# Patient Record
Sex: FEMALE | Race: WHITE | NOT HISPANIC OR LATINO | Employment: FULL TIME | URBAN - METROPOLITAN AREA
[De-identification: names, ages, dates, MRNs, and addresses within clinical notes are randomized per-mention and may not be internally consistent; named-entity substitution may affect disease eponyms.]

---

## 2017-01-09 RX ORDER — NORGESTIMATE AND ETHINYL ESTRADIOL 7DAYSX3 28
1 KIT ORAL DAILY
Qty: 84 TABLET | Refills: 3 | Status: SHIPPED | OUTPATIENT
Start: 2017-01-09 | End: 2017-02-14 | Stop reason: SDUPTHER

## 2017-02-06 ENCOUNTER — TELEPHONE (OUTPATIENT)
Dept: INTERNAL MEDICINE | Facility: CLINIC | Age: 37
End: 2017-02-06

## 2017-02-06 DIAGNOSIS — E03.9 HYPOTHYROIDISM, UNSPECIFIED TYPE: Primary | ICD-10-CM

## 2017-02-06 RX ORDER — LEVOTHYROXINE SODIUM 125 UG/1
TABLET ORAL
Qty: 90 TABLET | Refills: 0 | Status: SHIPPED | OUTPATIENT
Start: 2017-02-06 | End: 2017-04-14 | Stop reason: SDUPTHER

## 2017-02-14 ENCOUNTER — OFFICE VISIT (OUTPATIENT)
Dept: OBSTETRICS AND GYNECOLOGY | Facility: CLINIC | Age: 37
End: 2017-02-14
Payer: COMMERCIAL

## 2017-02-14 VITALS
HEIGHT: 67 IN | WEIGHT: 233.44 LBS | DIASTOLIC BLOOD PRESSURE: 70 MMHG | SYSTOLIC BLOOD PRESSURE: 120 MMHG | BODY MASS INDEX: 36.64 KG/M2

## 2017-02-14 DIAGNOSIS — Z01.419 ENCOUNTER FOR GYNECOLOGICAL EXAMINATION: Primary | ICD-10-CM

## 2017-02-14 DIAGNOSIS — Z80.3 FAMILY HISTORY OF BREAST CANCER: ICD-10-CM

## 2017-02-14 PROCEDURE — 99999 PR PBB SHADOW E&M-EST. PATIENT-LVL III: CPT | Mod: PBBFAC,,, | Performed by: OBSTETRICS & GYNECOLOGY

## 2017-02-14 PROCEDURE — 99395 PREV VISIT EST AGE 18-39: CPT | Mod: S$GLB,,, | Performed by: OBSTETRICS & GYNECOLOGY

## 2017-02-14 RX ORDER — NORGESTIMATE AND ETHINYL ESTRADIOL 7DAYSX3 28
1 KIT ORAL DAILY
Qty: 84 TABLET | Refills: 3 | Status: SHIPPED | OUTPATIENT
Start: 2017-02-14 | End: 2017-08-18 | Stop reason: SDUPTHER

## 2017-02-14 NOTE — PROGRESS NOTES
HPI: Pt is a 36 y.o.  female who presents for routine annual exam. She uses [facility] for contraception. She does not want STD screening.     Pap in 2015 -- normal      ROS:  GENERAL: Feeling well overall. Denies fever or chills.   SKIN: Denies rash or lesions.   HEAD: Denies head injury or headache.   NODES: Denies enlarged lymph nodes.   CHEST: Denies chest pain or shortness of breath.   CARDIOVASCULAR: Denies palpitations or left sided chest pain.   ABDOMEN: No abdominal pain, constipation, diarrhea, nausea, vomiting or rectal bleeding.   URINARY: No dysuria, hematuria, or burning on urination.  REPRODUCTIVE: See HPI.   BREASTS: Denies pain, lumps, or nipple discharge.   HEMATOLOGIC: No easy bruisability or excessive bleeding.   MUSCULOSKELETAL: Denies joint pain or swelling.   NEUROLOGIC: Denies syncope or weakness.   PSYCHIATRIC: Denies depression, anxiety or mood swings.    PE:   APPEARANCE: Well nourished, well developed, White female in no acute distress.  NODES: no cervical, supraclavicular, or inguinal lymphadenopathy  BREASTS: Symmetrical, no skin changes or visible lesions. No palpable masses, nipple discharge or adenopathy bilaterally.  ABDOMEN: Soft. No tenderness or masses. No distention. No hernias palpated. No CVA tenderness.  VULVA: No lesions. Normal external female genitalia.  URETHRAL MEATUS: Normal size and location, no lesions, no prolapse.  URETHRA: No masses, tenderness, or prolapse.  VAGINA: Moist. No lesions or lacerations noted. No abnormal discharge present. No odor present.   CERVIX: No lesions or discharge. No cervical motion tenderness.   UTERUS: Normal size, regular shape, mobile, non-tender.  ADNEXA: No tenderness. No fullness or masses palpated in the adnexal regions.   ANUS PERINEUM: Normal.      Diagnosis:  1. Encounter for gynecological examination    2. Family history of breast cancer        Plan:     Orders Placed This Encounter    Mammo Digital Screening Bilat with  Tomosynthesis CAD    norgestimate-ethinyl estradiol (TRI-SPRINTEC, 28,) 0.18/0.215/0.25 mg-35 mcg (28) tablet     - patient has 2 family members -- maternal grandmother and maternal aunt with breast cancer. Will get baseline MMG.     Patient was counseled today on the new ACS guidelines for cervical cytology screening as well as the current recommendations for breast cancer screening. She was counseled to follow up with her PCP for other routine health maintenance. Counseling session lasted approximately 10 minutes, and all her questions were answered.    Follow-up with me in 1 year for routine exam; pap next year.

## 2017-02-14 NOTE — MR AVS SNAPSHOT
"    [facility] [address]  [address]  [place] [address]  Phone: [phone]  Fax: [phone]                  Josefina Mark   2017 10:00 AM   Office Visit    Description:  Female : 1980   Provider:  Yaritza Oden MD   Department:  [facility] [address]           Reason for Visit     Gynecologic Exam                To Do List           Future Appointments        Provider Department Dept Phone    2017 8:30 AM Lida Garcia MD [facility] - Internal Medicine [phone]      Goals (5 Years of Data)     None      [facility]     [facility] On Call Nurse [facility] Line -  Assistance  Registered nurses in the [facility] On Call Center provide clinical advisement, health education, appointment booking, and other advisory services.  Call for this free service at [phone].             Medications           Message regarding Medications     Verify the changes and/or additions to your medication regime listed below are the same as discussed with your clinician today.  If any of these changes or additions are incorrect, please notify your healthcare provider.             Verify that the below list of medications is an accurate representation of the medications you are currently taking.  If none reported, the list may be blank. If incorrect, please contact your healthcare provider. Carry this list with you in case of emergency.           Current Medications     levothyroxine (SYNTHROID) 125 MCG tablet TAKE 1 TABLET BY MOUTH EVERY DAY    norgestimate-ethinyl estradiol (TRI-SPRINTEC, 28,) 0.18/0.215/0.25 mg-35 mcg (28) tablet Take 1 tablet by mouth once daily.           Clinical Reference Information           Your Vitals Were     BP Height Weight Last Period BMI    120/70 5' 7" (1.702 m) 105.9 kg (233 lb 7.5 oz) 2017 (Exact Date) 36.57 kg/m2      Blood Pressure          Most Recent Value    BP  120/70      Allergies as of 2017     No Known " Allergies      Immunizations Administered on Date of Encounter - 2/14/2017     None      Language Assistance Services     ATTENTION: Language assistance services are available, free of charge. Please call [phone].      ATENCIÓN: Si radha oliveira, tiene a blackwell disposición servicios gratuitos de asistencia lingüística. Llame al [phone].     CHÚ Ý: N?u b?n nói Ti?ng Vi?t, có các d?ch v? h? tr? ngôn ng? mi?n phí dành cho b?n. G?i s? [phone].         [facility] [address] complies with applicable Federal civil rights laws and does not discriminate on the basis of race, color, national origin, age, disability, or sex.

## 2017-02-17 ENCOUNTER — HOSPITAL ENCOUNTER (OUTPATIENT)
Dept: RADIOLOGY | Facility: HOSPITAL | Age: 37
Discharge: HOME | End: 2017-02-17
Attending: OBSTETRICS & GYNECOLOGY
Payer: COMMERCIAL

## 2017-02-17 DIAGNOSIS — Z12.31 VISIT FOR SCREENING MAMMOGRAM: ICD-10-CM

## 2017-02-17 DIAGNOSIS — Z80.3 FAMILY HISTORY OF BREAST CANCER: ICD-10-CM

## 2017-02-17 PROCEDURE — 77067 SCR MAMMO BI INCL CAD: CPT | Mod: TC

## 2017-02-17 PROCEDURE — 77063 BREAST TOMOSYNTHESIS BI: CPT | Mod: 26,,, | Performed by: RADIOLOGY

## 2017-02-17 PROCEDURE — 77067 SCR MAMMO BI INCL CAD: CPT | Mod: 26,,, | Performed by: RADIOLOGY

## 2017-04-11 ENCOUNTER — OFFICE VISIT (OUTPATIENT)
Dept: INTERNAL MEDICINE | Facility: CLINIC | Age: 37
End: 2017-04-11
Payer: COMMERCIAL

## 2017-04-11 VITALS
HEIGHT: 67 IN | BODY MASS INDEX: 36.68 KG/M2 | SYSTOLIC BLOOD PRESSURE: 118 MMHG | DIASTOLIC BLOOD PRESSURE: 86 MMHG | HEART RATE: 84 BPM | WEIGHT: 233.69 LBS

## 2017-04-11 DIAGNOSIS — E03.9 HYPOTHYROIDISM, UNSPECIFIED TYPE: ICD-10-CM

## 2017-04-11 DIAGNOSIS — Z00.00 ROUTINE MEDICAL EXAM: Primary | ICD-10-CM

## 2017-04-11 DIAGNOSIS — R73.03 PRE-DIABETES: ICD-10-CM

## 2017-04-11 LAB
AMORPH CRY UR QL COMP ASSIST: ABNORMAL
BACTERIA #/AREA URNS AUTO: ABNORMAL /HPF
BILIRUB UR QL STRIP: NEGATIVE
CLARITY UR REFRACT.AUTO: ABNORMAL
COLOR UR AUTO: ABNORMAL
GLUCOSE UR QL STRIP: ABNORMAL
HGB UR QL STRIP: NEGATIVE
HYALINE CASTS UR QL AUTO: 6 /LPF
KETONES UR QL STRIP: NEGATIVE
LEUKOCYTE ESTERASE UR QL STRIP: NEGATIVE
MICROSCOPIC COMMENT: ABNORMAL
NITRITE UR QL STRIP: NEGATIVE
PH UR STRIP: 5 [PH] (ref 5–8)
PROT UR QL STRIP: NEGATIVE
RBC #/AREA URNS AUTO: 10 /HPF (ref 0–4)
SP GR UR STRIP: 1.03 (ref 1–1.03)
SQUAMOUS #/AREA URNS AUTO: 2 /HPF
URN SPEC COLLECT METH UR: ABNORMAL
UROBILINOGEN UR STRIP-ACNC: NEGATIVE EU/DL

## 2017-04-11 PROCEDURE — 99999 PR PBB SHADOW E&M-EST. PATIENT-LVL III: CPT | Mod: PBBFAC,,, | Performed by: INTERNAL MEDICINE

## 2017-04-11 PROCEDURE — 99395 PREV VISIT EST AGE 18-39: CPT | Mod: S$GLB,,, | Performed by: INTERNAL MEDICINE

## 2017-04-11 PROCEDURE — 81001 URINALYSIS AUTO W/SCOPE: CPT

## 2017-04-11 NOTE — PROGRESS NOTES
Subjective:       Patient ID: Josefina Mark is a 37 y.o. female.    Chief Complaint: Annual Exam    HPI Comments: Last seen 19 months ago. Returns for annual exam with no complaints. Admits lack of weight loss is due to junk food snacking and lack of exercise. Compliant with daily dosing of Levothyroxine, has called for refills.     Past Medical History: .   Hypothyroidism. TSH 1.88 Sep. '15.  Pre-Diabetes, Fasting Glucose 110, HbA1c 6.0%.  Obesity.  Pelvic exam 9/15 - Pap normal, Mammogram normal , Eye exam 8/15. Flu shot . Labs 9/15: CBC, CMP and Lipids normal (LDL 74).     Medications: Synthroid 125 mcg daily. OCP's.    No known drug allergies.    Social History: R.N. 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    Family medical history: Lung cancer in father. Mother has interstitial cystitis, a grandmother has diabetes and hypertension, paternal grandfather with prostate cancer, maternal grandmother with breast cancer at age 58, maternal aunt x2 with breast cancer, maternal grandfather with liver cancer, paternal aunt had colon cancer.                 Review of Systems   Constitutional: Negative for activity change, appetite change, fatigue, fever and unexpected weight change.   HENT: Negative for congestion, hearing loss, rhinorrhea, sneezing, sore throat, trouble swallowing and voice change.    Eyes: Negative for pain, redness and visual disturbance.   Respiratory: Negative for cough, chest tightness, shortness of breath and wheezing.    Cardiovascular: Negative for chest pain, palpitations and leg swelling.   Gastrointestinal: Negative for abdominal pain, blood in stool, constipation, diarrhea, nausea and vomiting.   Genitourinary: Negative for difficulty urinating, dysuria, flank pain, frequency, hematuria, menstrual problem and urgency.   Musculoskeletal: Negative for arthralgias, back pain, joint swelling, myalgias and neck pain.   Skin: Negative  "for rash and wound.   Neurological: Negative for dizziness, syncope, facial asymmetry, speech difficulty, weakness, numbness and headaches.   Hematological: Negative for adenopathy. Does not bruise/bleed easily.   Psychiatric/Behavioral: Negative for decreased concentration, dysphoric mood and sleep disturbance. The patient is not nervous/anxious.        Objective:    /86, Pulse 84, Ht 5' 7", Wt 233.7 lbs, BMI=36.6  Physical Exam   Constitutional: She is oriented to person, place, and time. She appears well-developed and well-nourished. No distress.   HENT:   Head: Normocephalic and atraumatic.   Right Ear: External ear normal.   Left Ear: External ear normal.   Nose: Nose normal.   Mouth/Throat: Oropharynx is clear and moist. No oropharyngeal exudate.   Eyes: Conjunctivae and EOM are normal. Pupils are equal, round, and reactive to light. Right conjunctiva is not injected. Left conjunctiva is not injected. No scleral icterus.   Neck: Normal range of motion. Neck supple. No JVD present. No thyromegaly present.   Cardiovascular: Normal rate, regular rhythm and normal heart sounds.  Exam reveals no gallop and no friction rub.    No murmur heard.  Pulmonary/Chest: Effort normal and breath sounds normal. No respiratory distress. She has no wheezes. She has no rhonchi. She has no rales.   Abdominal: Soft. Bowel sounds are normal. She exhibits no distension. There is no hepatosplenomegaly. There is no tenderness. There is no CVA tenderness.   Musculoskeletal: Normal range of motion. She exhibits no edema, tenderness or deformity.   Lymphadenopathy:     She has no cervical adenopathy.   Neurological: She is alert and oriented to person, place, and time. She has normal strength. No cranial nerve deficit. She exhibits normal muscle tone. Coordination and gait normal.   Skin: Skin is warm and dry. No lesion and no rash noted. She is not diaphoretic. No cyanosis or erythema. No pallor. Nails show no clubbing. "   Psychiatric: She has a normal mood and affect. Her behavior is normal. Thought content normal.   Vitals reviewed.      Assessment:       1. Routine medical exam    2. Hypothyroidism, unspecified type    3. Pre-diabetes    4. Obesity, Class II, BMI 35-39.9, with comorbidity        Plan:       Routine medical exam  -     CBC auto differential; Future; Expected date: 4/11/17  -     Comprehensive metabolic panel; Future; Expected date: 4/11/17  -     Lipid panel; Future; Expected date: 4/11/17  -     Vitamin D; Future; Expected date: 4/11/17  -     Urinalysis    Hypothyroidism, unspecified type  -     TSH; Future; Expected date: 4/11/17    Pre-diabetes  -     Hemoglobin A1c; Future; Expected date: 4/11/17    Obesity, Class II, BMI 35-39.9, with comorbidity        -     Counseled on diet and exercise for weight reduction, diabetes prevention and cardiovascular health.

## 2017-04-11 NOTE — MR AVS SNAPSHOT
[facility]  [address]  [facility] [address]  Phone: [phone]  Fax: [phone]                  Josefina Mark   2017 8:30 AM   Office Visit    Description:  Female : 1980   Provider:  Lida Garcia MD   Department:  [facility]           Reason for Visit     Annual Exam           Diagnoses this Visit        Comments    Routine medical exam    -  Primary     Hypothyroidism, unspecified type         Pre-diabetes         Obesity, Class II, BMI 35-39.9, with comorbidity                To Do List           Future Appointments        Provider Department Dept Phone    2017 8:15 AM LAB, [place] [facility] [phone]      Goals (5 Years of Data)     None      Follow-Up and Disposition     Return in about 1 year (around 2018).      [facility]     [facility] Nurse Care Line -  Assistance  Unless otherwise directed by your provider, please contact [facility], our nurse care line that is available for  assistance.     Registered nurses in the [facility] On Call Center provide: appointment scheduling, clinical advisement, health education, and other advisory services.  Call: [phone] (toll free)               Medications           Message regarding Medications     Verify the changes and/or additions to your medication regime listed below are the same as discussed with your clinician today.  If any of these changes or additions are incorrect, please notify your healthcare provider.             Verify that the below list of medications is an accurate representation of the medications you are currently taking.  If none reported, the list may be blank. If incorrect, please contact your healthcare provider. Carry this list with you in case of emergency.           Current Medications     levothyroxine (SYNTHROID) 125 MCG tablet TAKE 1 TABLET BY MOUTH EVERY DAY    norgestimate-ethinyl estradiol  "(TRI-SPRINTEC, 28,) 0.18/0.215/0.25 mg-35 mcg (28) tablet Take 1 tablet by mouth once daily.           Clinical Reference Information           Your Vitals Were     BP Pulse Height Weight BMI    118/86 84 5' 7" (1.702 m) 106 kg (233 lb 11.2 oz) 36.6 kg/m2      Blood Pressure          Most Recent Value    BP  118/86      Allergies as of 4/11/2017     No Known Allergies      Immunizations Administered on Date of Encounter - 4/11/2017     None      Orders Placed During Today's Visit      Normal Orders This Visit    Urinalysis     Future Labs/Procedures Expected by Expires    CBC auto differential  4/11/2017 4/11/2018    Comprehensive metabolic panel  4/11/2017 4/11/2018    Hemoglobin A1c  4/11/2017 4/11/2018    Lipid panel  4/11/2017 4/11/2018    TSH  4/11/2017 4/11/2018    Vitamin D  4/11/2017 4/11/2018      Language Assistance Services     ATTENTION: Language assistance services are available, free of charge. Please call [phone].      ATENCIÓN: Si habla español, tiene a blackwell disposición servicios gratuitos de asistencia lingüística. Llame al [phone].     JONAH Ý: N?u b?n nói Ti?ng Vi?t, có các d?ch v? h? tr? ngôn ng? mi?n phí dành cho b?n. G?i s? [phone].         [facility] complies with applicable Federal civil rights laws and does not discriminate on the basis of race, color, national origin, age, disability, or sex.        "

## 2017-04-12 ENCOUNTER — LAB VISIT (OUTPATIENT)
Dept: LAB | Facility: HOSPITAL | Age: 37
End: 2017-04-12
Attending: INTERNAL MEDICINE
Payer: COMMERCIAL

## 2017-04-12 DIAGNOSIS — R73.03 PRE-DIABETES: ICD-10-CM

## 2017-04-12 DIAGNOSIS — E03.9 HYPOTHYROIDISM, UNSPECIFIED TYPE: ICD-10-CM

## 2017-04-12 DIAGNOSIS — Z00.00 ROUTINE MEDICAL EXAM: ICD-10-CM

## 2017-04-12 LAB
25(OH)D3+25(OH)D2 SERPL-MCNC: 15 NG/ML
ALBUMIN SERPL BCP-MCNC: 3.6 G/DL
ALP SERPL-CCNC: 57 U/L
ALT SERPL W/O P-5'-P-CCNC: 69 U/L
ANION GAP SERPL CALC-SCNC: 9 MMOL/L
AST SERPL-CCNC: 39 U/L
BASOPHILS # BLD AUTO: 0.04 K/UL
BASOPHILS NFR BLD: 0.6 %
BILIRUB SERPL-MCNC: 0.4 MG/DL
BUN SERPL-MCNC: 10 MG/DL
CALCIUM SERPL-MCNC: 9.2 MG/DL
CHLORIDE SERPL-SCNC: 103 MMOL/L
CHOLEST/HDLC SERPL: 3.8 {RATIO}
CO2 SERPL-SCNC: 24 MMOL/L
CREAT SERPL-MCNC: 0.8 MG/DL
DIFFERENTIAL METHOD: NORMAL
EOSINOPHIL # BLD AUTO: 0.2 K/UL
EOSINOPHIL NFR BLD: 3.2 %
ERYTHROCYTE [DISTWIDTH] IN BLOOD BY AUTOMATED COUNT: 12.6 %
EST. GFR  (AFRICAN AMERICAN): >60 ML/MIN/1.73 M^2
EST. GFR  (NON AFRICAN AMERICAN): >60 ML/MIN/1.73 M^2
GLUCOSE SERPL-MCNC: 184 MG/DL
HCT VFR BLD AUTO: 40 %
HDL/CHOLESTEROL RATIO: 26.3 %
HDLC SERPL-MCNC: 205 MG/DL
HDLC SERPL-MCNC: 54 MG/DL
HGB BLD-MCNC: 13.1 G/DL
LDLC SERPL CALC-MCNC: 119.6 MG/DL
LYMPHOCYTES # BLD AUTO: 2.6 K/UL
LYMPHOCYTES NFR BLD: 39.5 %
MCH RBC QN AUTO: 29.4 PG
MCHC RBC AUTO-ENTMCNC: 32.8 %
MCV RBC AUTO: 90 FL
MONOCYTES # BLD AUTO: 0.5 K/UL
MONOCYTES NFR BLD: 7 %
NEUTROPHILS # BLD AUTO: 3.3 K/UL
NEUTROPHILS NFR BLD: 49.7 %
NONHDLC SERPL-MCNC: 151 MG/DL
PLATELET # BLD AUTO: 297 K/UL
PMV BLD AUTO: 10.4 FL
POTASSIUM SERPL-SCNC: 4.2 MMOL/L
PROT SERPL-MCNC: 7.3 G/DL
RBC # BLD AUTO: 4.46 M/UL
SODIUM SERPL-SCNC: 136 MMOL/L
TRIGL SERPL-MCNC: 157 MG/DL
TSH SERPL DL<=0.005 MIU/L-ACNC: 2.21 UIU/ML
WBC # BLD AUTO: 6.56 K/UL

## 2017-04-12 PROCEDURE — 36415 COLL VENOUS BLD VENIPUNCTURE: CPT | Mod: PO

## 2017-04-12 PROCEDURE — 83036 HEMOGLOBIN GLYCOSYLATED A1C: CPT

## 2017-04-12 PROCEDURE — 85025 COMPLETE CBC W/AUTO DIFF WBC: CPT | Mod: PO

## 2017-04-12 PROCEDURE — 80061 LIPID PANEL: CPT

## 2017-04-12 PROCEDURE — 82306 VITAMIN D 25 HYDROXY: CPT

## 2017-04-12 PROCEDURE — 84443 ASSAY THYROID STIM HORMONE: CPT

## 2017-04-12 PROCEDURE — 80053 COMPREHEN METABOLIC PANEL: CPT

## 2017-04-13 LAB
ESTIMATED AVG GLUCOSE: 174 MG/DL
HBA1C MFR BLD HPLC: 7.7 %

## 2017-04-14 ENCOUNTER — PATIENT MESSAGE (OUTPATIENT)
Dept: INTERNAL MEDICINE | Facility: CLINIC | Age: 37
End: 2017-04-14

## 2017-04-14 DIAGNOSIS — E11.9 TYPE 2 DIABETES MELLITUS WITHOUT COMPLICATION, WITHOUT LONG-TERM CURRENT USE OF INSULIN: ICD-10-CM

## 2017-04-14 DIAGNOSIS — E55.9 VITAMIN D INSUFFICIENCY: ICD-10-CM

## 2017-04-14 DIAGNOSIS — E03.9 HYPOTHYROIDISM, UNSPECIFIED TYPE: ICD-10-CM

## 2017-04-14 RX ORDER — ERGOCALCIFEROL 1.25 MG/1
50000 CAPSULE ORAL
Qty: 12 CAPSULE | Refills: 0 | Status: SHIPPED | OUTPATIENT
Start: 2017-04-14 | End: 2017-09-21 | Stop reason: SDUPTHER

## 2017-04-14 RX ORDER — LANCETS
1 EACH MISCELLANEOUS DAILY
Qty: 100 EACH | Refills: 3 | Status: SHIPPED | OUTPATIENT
Start: 2017-04-14 | End: 2018-12-27

## 2017-04-14 RX ORDER — LEVOTHYROXINE SODIUM 125 UG/1
125 TABLET ORAL DAILY
Qty: 90 TABLET | Refills: 3 | Status: SHIPPED | OUTPATIENT
Start: 2017-04-14 | End: 2018-05-31

## 2017-04-14 RX ORDER — METFORMIN HYDROCHLORIDE 750 MG/1
750 TABLET, EXTENDED RELEASE ORAL
Qty: 30 TABLET | Refills: 3 | Status: SHIPPED | OUTPATIENT
Start: 2017-04-14 | End: 2017-08-18 | Stop reason: SDUPTHER

## 2017-04-14 RX ORDER — INSULIN PUMP SYRINGE, 3 ML
EACH MISCELLANEOUS
Qty: 1 EACH | Refills: 0 | Status: ON HOLD | OUTPATIENT
Start: 2017-04-14 | End: 2019-05-22 | Stop reason: HOSPADM

## 2017-04-14 NOTE — TELEPHONE ENCOUNTER
Results posted with message explaining. Please schedule Diabetes Education for new onset diab type 2.

## 2017-04-17 ENCOUNTER — TELEPHONE (OUTPATIENT)
Dept: INTERNAL MEDICINE | Facility: CLINIC | Age: 37
End: 2017-04-17

## 2017-04-21 ENCOUNTER — CLINICAL SUPPORT (OUTPATIENT)
Dept: DIABETES | Facility: CLINIC | Age: 37
End: 2017-04-21
Payer: COMMERCIAL

## 2017-04-21 DIAGNOSIS — E11.9 TYPE 2 DIABETES MELLITUS WITHOUT COMPLICATION, WITHOUT LONG-TERM CURRENT USE OF INSULIN: Primary | ICD-10-CM

## 2017-04-21 PROCEDURE — G0109 DIAB MANAGE TRN IND/GROUP: HCPCS | Mod: S$GLB,,, | Performed by: INTERNAL MEDICINE

## 2017-04-21 NOTE — PROGRESS NOTES
Diabetes Education  Author: Jillian Kevin RD  Date: 4/21/2017    Diabetes Education Visit  Diabetes Education Record Assessment/Progress: Comprehensive/Group (class)    Diabetes Type  Diabetes Type : Type II    Diabetes History  Diabetes Diagnosis: 0-1 year    Nutrition  Meal Planning:  (eats 3 meals per day; snacks in the evening; uses splenda; drinks diet soda and unsweetened tea)    Monitoring   Monitoring: Freestyle Freedom Lite  Self Monitoring :  (SMBG once daily; fasting)    Exercise   Exercise Type: walking  Frequency: 3-5 Times per week  Duration: 45 min    Current Diabetes Treatment   Current Treatment: Oral Medication (metformin 750 mg once daily)    Social History  Preferred Learning Method: Face to Face, Web Based  Primary Support: Self, Spouse  Educational Level: College Graduate  Occupation:  (RN)  Smoking Status: Never a Smoker  Alcohol Use: Rarely      DDS Score  ( > 3 = SIGNIFICANT DISTRESS): 1.76  Emotional [place] Score: 2.8  Physician-Related Distress: 1  Regimen-Related Distress: 1.4  Interpersonal Distress: 1.67    Barriers to Change  Barriers to Change: None  Learning Challenges : None    Readiness to Learn   Readiness to Learn : Acceptance    Cultural Influences  Cultural Influences: No    Diabetes Education Assessment/Progress  Acute Complications (preventing, detecting, and treating acute complications): Discussion, Class, Written Materials Provided, Instructed  Chronic Complications (preventing, detecting, and treating chronic complications): Discussion, Class, Written Materials Provided, Instructed  Diabetes Disease Process (diabetes disease process and treatment options): Discussion, Class, Written Materials Provided, Instructed  Nutrition (Incorporating nutritional management into one's lifestyle): Discussion, Class, Written Materials Provided, Instructed  Physical Activity (incorporating physical activity into one's lifestyle): Discussion, Class, Written Materials Provided,  Instructed  Medications (states correct name, dose, onset, peak, duration, side effects & timing of meds): Discussion, Written Materials Provided, Class, Instructed  Monitoring (monitoring blood glucose/other parameters & using results): Discussion, Class, Written Materials Provided, Instructed  Goal Setting and Problem Solving (verbalizes behavior change strategies & sets realistic goals): Discussion, Class  Behavior Change (developing personal strategies to health & behavior change): Discussion, Class  Psychosocial Issues (developing personal srategies to address psychosocial concerns): Discussion, Class    Goals  Other: Set (see attached goal sheet)         Diabetes Care Plan/Intervention  Education Plan/Intervention: Individual Follow-Up DSMT (3 month diabetes education recall will be sent to patient to schedule follow up in 3 mos)    Diabetes Meal Plan  Restrictions: Restricted Carbohydrate    Education Units of Time   Time Spent: 120 min      Health Maintenance Topics with due status: Not Due       Topic Last Completion Date    Pap Smear 09/14/2015    Lipid Panel 04/12/2017    Hemoglobin A1c 04/12/2017     Health Maintenance Due   Topic Date Due    Foot Exam  03/21/1989    Urine Microalbumin  03/21/1989    TETANUS VACCINE  03/21/1997    Pneumococcal PPSV23 (Medium Risk) (1) 03/21/1997    Influenza Vaccine  08/01/2016    Eye Exam  08/13/2016

## 2017-04-24 DIAGNOSIS — E11.9 TYPE 2 DIABETES MELLITUS WITHOUT COMPLICATION: ICD-10-CM

## 2017-06-06 ENCOUNTER — OFFICE VISIT (OUTPATIENT)
Dept: OPTOMETRY | Facility: CLINIC | Age: 37
End: 2017-06-06
Payer: COMMERCIAL

## 2017-06-06 DIAGNOSIS — H33.322 RETINAL HOLE OF LEFT EYE: Primary | ICD-10-CM

## 2017-06-06 DIAGNOSIS — H52.13 MYOPIA, BILATERAL: ICD-10-CM

## 2017-06-06 DIAGNOSIS — Z01.00 EXAMINATION OF EYES AND VISION: ICD-10-CM

## 2017-06-06 DIAGNOSIS — Z13.5 SCREENING FOR OTHER EYE CONDITIONS: ICD-10-CM

## 2017-06-06 PROCEDURE — 92015 DETERMINE REFRACTIVE STATE: CPT | Mod: S$GLB,,, | Performed by: OPTOMETRIST

## 2017-06-06 PROCEDURE — 92014 COMPRE OPH EXAM EST PT 1/>: CPT | Mod: S$GLB,,, | Performed by: OPTOMETRIST

## 2017-06-06 PROCEDURE — 99999 PR PBB SHADOW E&M-EST. PATIENT-LVL III: CPT | Mod: PBBFAC,,, | Performed by: OPTOMETRIST

## 2017-06-06 NOTE — PATIENT INSTRUCTIONS
Asymptomatic peripheral retinal hole in the left eye (inferotemporally).  Suggest retina consult as a precaution.    Otherwise, good ocular health in both eyes.    Myopia (nearsightedness) in each eye, with good correctable VA in each eye.  New spectacle lens Rx issued for use as desired, in lieu of CLs.    Mrs. Mark interested in resuming CL wear, probably on a part-time basis.  Schedule contact lens fitting within the next few days.  Considering daily disposable SCLs.    Repeat general eye examination in one year.

## 2017-06-06 NOTE — PROGRESS NOTES
HPI     Concerns About Ocular Health    Additional comments: Eye exam and refraction.  Interested in resuming CL   wear.  Wore CL previously.  Did not do well with last CLs tried by Dr. Walker.           Comments   Patient's age: 37 y.o. 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 Lasha Walker   City/State: [place]   Wears glasses? Yes      If yes, wears  Full-time or part-time?  Full time   Present glasses are: Bifocal, SV Distance, SV Reading?  SV  Distance   Approximate age of present glasses:  2+ yrs old    Got new glasses following last exam, or subsequently?:  Yes    Any problem with VA with glasses?  No  Wears CLs?:  Yes , hasn't work contact lens since 2015           If yes:              Type of CL worn:  SCLs worn previously.                 Any problem with VA with CLs?  Patient would like to try a   different brand of contacts               Has patient read and signed the contact lens fee information   document? yes  Headaches?  No  Eye pain/discomfort?  No                                                                                     Flashes?  No  Floaters?  No  Diplopia/Double vision?  no  Patient's Ocular History:         Any eye surgeries? No         Any eye injury?  No         Any treatment for eye disease?  No  Family history of eye disease?  No  Significant patient medical history:         1. Diabetes?  Yes LBS 95 this AM  - recent diagnosis of type 2   diabetes.  Taking Metformin.        If yes, IDDM or NIDDM?      2. HBP?  No                ! OTC eyedrops currently using:  No   ! Prescription eye meds currently using:  No   ! Any history of allergy/adverse reaction to any eye meds used   previously?  No    ! Any history of allergy/adverse reaction to eyedrops used during prior   eye exam(s)? No    ! Any history of allergy/adverse reaction to Novacaine or similar meds?   No   ! Any history of allergy/adverse  reaction to Epinephrine or similar meds?   No    ! Patient okay with use of anesthetic eyedrops to check eye pressure?    yes        ! Patient okay with use of eyedrops to dilate pupils today?  yes   !  Allergies/Medications/Medical History/Family History reviewed today?    Yes       PD =   60/56                                                                  Last edited by Pop Guerrier, OD on 6/6/2017  9:04 AM. (History)            Assessment /Plan     For exam results, see Encounter Report.    1. Retinal hole of left eye  Ambulatory Referral to Ophthalmology   2. Examination of eyes and vision     3. Myopia, bilateral     4. Screening for other eye conditions                      Asymptomatic peripheral retinal hole in the left eye (inferotemporally).  Suggest retina consult as a precaution.    Otherwise, good ocular health in both eyes.    Myopia (nearsightedness) in each eye, with good correctable VA in each eye.  New spectacle lens Rx issued for use as desired, in lieu of CLs.    Mrs. Mark interested in resuming CL wear, probably on a part-time basis.  Schedule contact lens fitting within the next few days.  Considering daily disposable SCLs.    Repeat general eye examination in one year.

## 2017-06-07 ENCOUNTER — OFFICE VISIT (OUTPATIENT)
Dept: OPTOMETRY | Facility: CLINIC | Age: 37
End: 2017-06-07

## 2017-06-07 DIAGNOSIS — Z46.0 ENCOUNTER FOR FITTING OR ADJUSTMENT OF SPECTACLES OR CONTACT LENSES: Primary | ICD-10-CM

## 2017-06-07 PROCEDURE — 99499 UNLISTED E&M SERVICE: CPT | Mod: S$GLB,,, | Performed by: OPTOMETRIST

## 2017-06-07 PROCEDURE — 92310 CONTACT LENS FITTING OU: CPT | Mod: S$GLB,,, | Performed by: OPTOMETRIST

## 2017-06-07 PROCEDURE — 99999 PR PBB SHADOW E&M-EST. PATIENT-LVL III: CPT | Mod: PBBFAC,,, | Performed by: OPTOMETRIST

## 2017-06-07 NOTE — PROGRESS NOTES
HPI     Contact Lens Fit    Additional comments: Interested in daily disposable SCLs for occasional   wear.           Comments   Patient in for contact lens fitting.    Date last seen: 06/06/17    Doctor last seen: Dr. Guerrier     Currently wearing contact lenses?  No             If no, ever worn contact lenses previously?  Yes             If yes, type previously worn:  Scl's     Refer to contact lens section of record.        Last edited by Pop Guerrier, OD on 6/7/2017  5:05 PM. (History)            Assessment /Plan     For exam results, see Encounter Report.    1. Encounter for fitting or adjustment of spectacles or contact lenses                    Dispensed trial Parachute Aqua Comfort Plus SCLs:    OD    8.7    -3.00    OS    8.7    -3.00  Daily wear only - occasional wear okay.  Return on 06/15/2017 for CL follow-up visit.  Have CLs in for a few hours at the time of the appointment.    Call/return sooner if any problems noted in the interim.

## 2017-06-07 NOTE — PATIENT INSTRUCTIONS
Dispensed trial Mahin Aqua Comfort Plus SCLs:    OD    8.7    -3.00    OS    8.7    -3.00  Daily wear only - occasional wear okay.  Return on 06/15/2017 for CL follow-up visit.  Have CLs in for a few hours at the time of the appointment.    Call/return sooner if any problems noted in the interim.

## 2017-06-15 ENCOUNTER — OFFICE VISIT (OUTPATIENT)
Dept: OPTOMETRY | Facility: CLINIC | Age: 37
End: 2017-06-15

## 2017-06-15 DIAGNOSIS — Z46.0 ENCOUNTER FOR FITTING OR ADJUSTMENT OF SPECTACLES OR CONTACT LENSES: Primary | ICD-10-CM

## 2017-06-15 PROCEDURE — 99499 UNLISTED E&M SERVICE: CPT | Mod: S$GLB,,, | Performed by: OPTOMETRIST

## 2017-06-15 NOTE — PATIENT INSTRUCTIONS
Good contact lens fit OD and OS with present trial Mahin Changfort Plus SCLs.  Wearing CLs well in both eyes.  Good VA in each eye.  New contact lens Rx issued.   Okay to wear on occasional basis.  Recheck in one year - or prior if any problems in the interim.

## 2017-06-18 NOTE — PROGRESS NOTES
HPI     Patient in for contact lens follow-up.    Contact lenses patient currently wearing:  OD 8.7 -3.00                                                                       OS   8.7 -3.00  Rylielies Aqua Comfort Plus     Patient's report on visual acuity with contact lenses:  Distance:                                                                                            Near     Any problems with Contact Lens comfort?  No    Contact lens wearing time (hours/per day): 10-12 hours occasionally     How long have Contact Lenses been in today?  9 hours    Does patient sleep with the contact lenses in?  No    Contact lens care system/solution used?        Last edited by Maritza Wilks on 6/15/2017  4:49 PM. (History)            Assessment /Plan     For exam results, see Encounter Report.    Encounter for fitting or adjustment of spectacles or contact lenses                   Good contact lens fit OD and OS with present trial Mahin AquaComfort Plus SCLs.  Wearing CLs well in both eyes.  Good VA in each eye.  New contact lens Rx issued.   Okay to wear on occasional basis.  Recheck in one year - or prior if any problems in the interim.

## 2017-06-19 ENCOUNTER — INITIAL CONSULT (OUTPATIENT)
Dept: OPHTHALMOLOGY | Facility: CLINIC | Age: 37
End: 2017-06-19
Payer: COMMERCIAL

## 2017-06-19 VITALS — SYSTOLIC BLOOD PRESSURE: 99 MMHG | HEART RATE: 84 BPM | DIASTOLIC BLOOD PRESSURE: 70 MMHG

## 2017-06-19 DIAGNOSIS — H33.322 RETINA HOLE, LEFT: Primary | ICD-10-CM

## 2017-06-19 DIAGNOSIS — E11.9 DIABETES MELLITUS TYPE 2 WITHOUT RETINOPATHY: ICD-10-CM

## 2017-06-19 PROCEDURE — 99999 PR PBB SHADOW E&M-EST. PATIENT-LVL III: CPT | Mod: PBBFAC,,, | Performed by: OPHTHALMOLOGY

## 2017-06-19 PROCEDURE — 92014 COMPRE OPH EXAM EST PT 1/>: CPT | Mod: S$GLB,,, | Performed by: OPHTHALMOLOGY

## 2017-06-19 PROCEDURE — 92225 PR SPECIAL EYE EXAM, INITIAL: CPT | Mod: LT,S$GLB,, | Performed by: OPHTHALMOLOGY

## 2017-06-19 NOTE — PROGRESS NOTES
OCT WNL OU    A/P  1. Retina Hole OS  - no RD/SRF/+pigment  -  2. Lattice OS    3. DM w/o Retinopathy  BG/BP control     Discussed retinopexy vs observation.  Pt will consider whether wants retinopexy.  Recommend observation    Pathology of PVD, Retinal Tear, Retinal Detachment reviewed in great detail  RD precautions discussed in detail, patient expressed understanding  RTC 6 months, sooner PRN (especially ANY change flashes, floaters, vision, visual field)

## 2017-06-19 NOTE — LETTER
June 19, 2017      Pop Guerrier, OD  [address]  [facility] [address]           [facility]hthalmology  [address]  [facility] [address]  Phone: [phone]  Fax: [phone]          Patient: Josefina Mark   MR Number: [id number]   YOB: 1980   Date of Visit: 6/19/2017       Dear Dr. Pop Guerrier:    Thank you for referring Josefina Mark to me for evaluation. Attached you will find relevant portions of my assessment and plan of care.    If you have questions, please do not hesitate to call me. I look forward to following Josefina Mark along with you.    Sincerely,    Florian Mi MD    Enclosure  CC:  No Recipients    If you would like to receive this communication electronically, please contact [email or web address] or [phone] to request more information on [employer] Link access.    For providers and/or their staff who would like to refer a patient to [facility], please contact us through our one-stop-shop provider referral line, [facility], at [phone].    If you feel you have received this communication in error or would no longer like to receive these types of communications, please e-mail [email or web address]

## 2017-07-13 ENCOUNTER — PATIENT MESSAGE (OUTPATIENT)
Dept: INTERNAL MEDICINE | Facility: CLINIC | Age: 37
End: 2017-07-13

## 2017-07-13 ENCOUNTER — LAB VISIT (OUTPATIENT)
Dept: LAB | Facility: HOSPITAL | Age: 37
End: 2017-07-13
Attending: INTERNAL MEDICINE
Payer: COMMERCIAL

## 2017-07-13 ENCOUNTER — OFFICE VISIT (OUTPATIENT)
Dept: INTERNAL MEDICINE | Facility: CLINIC | Age: 37
End: 2017-07-13
Payer: COMMERCIAL

## 2017-07-13 VITALS
HEIGHT: 67 IN | DIASTOLIC BLOOD PRESSURE: 88 MMHG | WEIGHT: 200.63 LBS | SYSTOLIC BLOOD PRESSURE: 118 MMHG | HEART RATE: 84 BPM | BODY MASS INDEX: 31.49 KG/M2

## 2017-07-13 DIAGNOSIS — E66.9 OBESITY, CLASS I, BMI 30-34.9: ICD-10-CM

## 2017-07-13 DIAGNOSIS — E11.9 TYPE 2 DIABETES MELLITUS WITHOUT COMPLICATION, WITHOUT LONG-TERM CURRENT USE OF INSULIN: Primary | ICD-10-CM

## 2017-07-13 DIAGNOSIS — E55.9 VITAMIN D INSUFFICIENCY: ICD-10-CM

## 2017-07-13 DIAGNOSIS — E11.9 TYPE 2 DIABETES MELLITUS WITHOUT COMPLICATION, WITHOUT LONG-TERM CURRENT USE OF INSULIN: ICD-10-CM

## 2017-07-13 DIAGNOSIS — E03.9 HYPOTHYROIDISM, UNSPECIFIED TYPE: ICD-10-CM

## 2017-07-13 LAB
ANION GAP SERPL CALC-SCNC: 10 MMOL/L
BUN SERPL-MCNC: 11 MG/DL
CALCIUM SERPL-MCNC: 10 MG/DL
CHLORIDE SERPL-SCNC: 104 MMOL/L
CO2 SERPL-SCNC: 25 MMOL/L
CREAT SERPL-MCNC: 0.9 MG/DL
CREAT UR-MCNC: 68 MG/DL
EST. GFR  (AFRICAN AMERICAN): >60 ML/MIN/1.73 M^2
EST. GFR  (NON AFRICAN AMERICAN): >60 ML/MIN/1.73 M^2
ESTIMATED AVG GLUCOSE: 111 MG/DL
GLUCOSE SERPL-MCNC: 86 MG/DL
HBA1C MFR BLD HPLC: 5.5 %
MICROALBUMIN UR DL<=1MG/L-MCNC: 5 UG/ML
MICROALBUMIN/CREATININE RATIO: 7.4 UG/MG
POTASSIUM SERPL-SCNC: 4.6 MMOL/L
SODIUM SERPL-SCNC: 139 MMOL/L

## 2017-07-13 PROCEDURE — 82570 ASSAY OF URINE CREATININE: CPT

## 2017-07-13 PROCEDURE — 80048 BASIC METABOLIC PNL TOTAL CA: CPT

## 2017-07-13 PROCEDURE — 99999 PR PBB SHADOW E&M-EST. PATIENT-LVL III: CPT | Mod: PBBFAC,,, | Performed by: INTERNAL MEDICINE

## 2017-07-13 PROCEDURE — 3045F PR MOST RECENT HEMOGLOBIN A1C LEVEL 7.0-9.0%: CPT | Mod: S$GLB,,, | Performed by: INTERNAL MEDICINE

## 2017-07-13 PROCEDURE — 83036 HEMOGLOBIN GLYCOSYLATED A1C: CPT

## 2017-07-13 PROCEDURE — 99214 OFFICE O/P EST MOD 30 MIN: CPT | Mod: S$GLB,,, | Performed by: INTERNAL MEDICINE

## 2017-07-13 PROCEDURE — 36415 COLL VENOUS BLD VENIPUNCTURE: CPT

## 2017-07-13 NOTE — PROGRESS NOTES
"Subjective:       Patient ID: Josefina Mark is a 37 y.o. female.    Chief Complaint: Follow-up and Diabetes    Last seen 3 months ago for annual exam. Screening labs revealed new onset Diabetes Type 2 with Fasting Glucose 184 and HbA1c up to 7.7%. She started treatment with Metformin ER 750mg daily and received diabetes education later that same month. She has exercised a little more, but has made major changes in her diet and lost 33 pounds! Fasting glucose is down to under 105 per history. She is feeling well.     Past Medical History: .   Diabetes Type 2 diagnosed , HbA1c 7.7%.  Hypothyroidism. TSH 2..  Vitamin D insufficiency, 15 April '17.   Obesity.  Pelvic exam 9/15 - Pap normal, Mammogram normal , Eye exam . Flu shot . Labs : CBC normal, CMP normal except Glucose 184, ALT 69 (chronic, stable), TChol 205, , HDL 54, , Urinalysis - 1+ glucose, otherwise clear.     Medications: list reviewed and reconciled.    No known drug allergies.    Social History: R.N. 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    Family medical history: Lung cancer in father. Mother has interstitial cystitis, a grandmother has diabetes and hypertension, paternal grandfather with prostate cancer, maternal grandmother with breast cancer at age 58, maternal aunt x2 with breast cancer, maternal grandfather with liver cancer, paternal aunt had colon cancer.                     Review of Systems   Constitutional: Negative for diaphoresis and fatigue.   Respiratory: Negative for cough and shortness of breath.    Cardiovascular: Negative for chest pain, palpitations and leg swelling.   Gastrointestinal: Negative for abdominal pain, diarrhea, nausea and vomiting.   Neurological: Negative for dizziness, weakness, numbness and headaches.       Objective:    /88, Pulse 84, Ht 5' 7", Wt 200.6 lbs (from 234), BMI=31  Physical Exam   Constitutional: " She is oriented to person, place, and time. She appears well-developed and well-nourished. No distress.   Cardiovascular: Normal rate, regular rhythm and normal heart sounds.    Pulmonary/Chest: Effort normal and breath sounds normal. No respiratory distress.   Musculoskeletal: Normal range of motion. She exhibits no edema.   Protective Sensation (w/ 10 gram monofilament):  Right: Intact  Left: Intact    Visual Inspection:  Normal -  Bilateral    Pedal Pulses:   Right: Present  Left: Present    Posterior tibialis:   Right:Present  Left: Present     Neurological: She is alert and oriented to person, place, and time.   Skin: Skin is warm and dry. She is not diaphoretic.   Psychiatric: She has a normal mood and affect. Her behavior is normal.       Assessment:       1. Type 2 diabetes mellitus without complication, without long-term current use of insulin    2. Vitamin D insufficiency    3. Hypothyroidism, unspecified type    4. Obesity, Class I, BMI 30-34.9        Plan:       Type 2 diabetes mellitus without complication, without long-term current use of insulin - improving  -     Basic metabolic panel; Future; Expected date: 07/13/2017  -     Hemoglobin A1c; Future; Expected date: 07/13/2017  -     Microalbumin/creatinine urine ratio    Vitamin D insufficiency - took Rx D 50,000 weekly x 4 doses, may sustain with OTC Vit D 1,000 daily.    Hypothyroidism, unspecified type - at goal, continue same.     Obesity, Class I, BMI 30-34.9        -    Excellent progress with diet and exercise, continue.

## 2017-08-18 DIAGNOSIS — E11.9 TYPE 2 DIABETES MELLITUS WITHOUT COMPLICATION, WITHOUT LONG-TERM CURRENT USE OF INSULIN: ICD-10-CM

## 2017-08-18 RX ORDER — METFORMIN HYDROCHLORIDE 750 MG/1
TABLET, EXTENDED RELEASE ORAL
Qty: 30 TABLET | Refills: 0 | Status: SHIPPED | OUTPATIENT
Start: 2017-08-18 | End: 2017-09-21 | Stop reason: SDUPTHER

## 2017-08-18 RX ORDER — NORGESTIMATE AND ETHINYL ESTRADIOL 7DAYSX3 28
KIT ORAL
Qty: 84 TABLET | Refills: 3 | Status: SHIPPED | OUTPATIENT
Start: 2017-08-18 | End: 2017-09-21 | Stop reason: SDUPTHER

## 2017-09-21 DIAGNOSIS — E11.9 TYPE 2 DIABETES MELLITUS WITHOUT COMPLICATION, WITHOUT LONG-TERM CURRENT USE OF INSULIN: ICD-10-CM

## 2017-09-21 DIAGNOSIS — E55.9 VITAMIN D INSUFFICIENCY: ICD-10-CM

## 2017-09-21 RX ORDER — ERGOCALCIFEROL 1.25 MG/1
CAPSULE ORAL
Qty: 12 CAPSULE | Refills: 1 | Status: SHIPPED | OUTPATIENT
Start: 2017-09-21 | End: 2018-09-04 | Stop reason: SDUPTHER

## 2017-09-21 RX ORDER — NORGESTIMATE AND ETHINYL ESTRADIOL 7DAYSX3 28
KIT ORAL
Qty: 84 TABLET | Refills: 0 | Status: SHIPPED | OUTPATIENT
Start: 2017-09-21 | End: 2017-12-19 | Stop reason: SDUPTHER

## 2017-09-21 RX ORDER — METFORMIN HYDROCHLORIDE 750 MG/1
TABLET, EXTENDED RELEASE ORAL
Qty: 30 TABLET | Refills: 5 | Status: SHIPPED | OUTPATIENT
Start: 2017-09-21 | End: 2017-11-20 | Stop reason: DRUGHIGH

## 2017-11-16 ENCOUNTER — LAB VISIT (OUTPATIENT)
Dept: LAB | Facility: HOSPITAL | Age: 37
End: 2017-11-16
Attending: INTERNAL MEDICINE
Payer: COMMERCIAL

## 2017-11-16 ENCOUNTER — OFFICE VISIT (OUTPATIENT)
Dept: INTERNAL MEDICINE | Facility: CLINIC | Age: 37
End: 2017-11-16
Payer: COMMERCIAL

## 2017-11-16 VITALS
WEIGHT: 180.81 LBS | HEIGHT: 67 IN | BODY MASS INDEX: 28.38 KG/M2 | HEART RATE: 83 BPM | DIASTOLIC BLOOD PRESSURE: 84 MMHG | SYSTOLIC BLOOD PRESSURE: 106 MMHG

## 2017-11-16 DIAGNOSIS — E11.9 TYPE 2 DIABETES MELLITUS WITHOUT COMPLICATION, WITHOUT LONG-TERM CURRENT USE OF INSULIN: ICD-10-CM

## 2017-11-16 DIAGNOSIS — E11.9 TYPE 2 DIABETES MELLITUS WITHOUT COMPLICATION, WITHOUT LONG-TERM CURRENT USE OF INSULIN: Primary | ICD-10-CM

## 2017-11-16 DIAGNOSIS — E03.9 HYPOTHYROIDISM, UNSPECIFIED TYPE: ICD-10-CM

## 2017-11-16 LAB
ESTIMATED AVG GLUCOSE: 105 MG/DL
HBA1C MFR BLD HPLC: 5.3 %

## 2017-11-16 PROCEDURE — 99999 PR PBB SHADOW E&M-EST. PATIENT-LVL III: CPT | Mod: PBBFAC,,, | Performed by: INTERNAL MEDICINE

## 2017-11-16 PROCEDURE — 99213 OFFICE O/P EST LOW 20 MIN: CPT | Mod: S$GLB,,, | Performed by: INTERNAL MEDICINE

## 2017-11-16 PROCEDURE — 36415 COLL VENOUS BLD VENIPUNCTURE: CPT

## 2017-11-16 PROCEDURE — 83036 HEMOGLOBIN GLYCOSYLATED A1C: CPT

## 2017-11-17 NOTE — PROGRESS NOTES
Subjective:       Patient ID: Josefina Mark is a 37 y.o. female.    Chief Complaint: Diabetes    Last seen 4 months ago for f/u new onset Diabetes Type 2 with Fasting Glucose 184 and HbA1c up to 7.7%. She started treatment with Metformin ER 750mg and made significant changes in her diet. Lab was markedly improved, HbA1c down to 5.5%. Returns for f/u, fasting glucose now ranges 80-90's, She has lost another 20 pounds for a grand total of about 53 since diagnosis 7 months ago! Feeling great.      Past Medical History: .   Diabetes Type 2 diagnosed , HbA1c 7.7%.  Hypothyroidism. TSH 2..  Vitamin D insufficiency, 15 April '17.   Obesity.  Pelvic exam 9/15 - Pap normal, Mammogram normal , Eye exam . Flu shot . Labs : CBC normal, CMP normal except Glucose 184, ALT 69 (chronic, stable), TChol 205, , HDL 54, , Urinalysis - 1+ glucose, otherwise clear.     Medications: list reviewed and reconciled.    No known drug allergies.    Social History: R.N. 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    Family medical history: Lung cancer in father. Mother has interstitial cystitis, a grandmother has diabetes and hypertension, paternal grandfather with prostate cancer, maternal grandmother with breast cancer at age 58, maternal aunt x2 with breast cancer, maternal grandfather with liver cancer, paternal aunt had colon cancer.                     Diabetes   She has type 2 diabetes mellitus. No MedicAlert identification noted. The initial diagnosis of diabetes was made 8 months ago. Pertinent negatives for hypoglycemia include no confusion, dizziness, headaches, hunger, mood changes, nervousness/anxiousness, pallor, seizures, sleepiness, speech difficulty, sweats or tremors. Pertinent negatives for diabetes include no blurred vision, no chest pain, no fatigue, no foot paresthesias, no foot ulcerations, no polydipsia, no polyphagia, no  "polyuria, no visual change, no weakness and no weight loss. Pertinent negatives for hypoglycemia complications include no blackouts, no hospitalization, no nocturnal hypoglycemia, no required assistance and no required glucagon injection. Symptoms are improving. Pertinent negatives for diabetic complications include no autonomic neuropathy, CVA, heart disease, impotence, nephropathy, peripheral neuropathy, PVD or retinopathy. Risk factors for coronary artery disease include obesity and diabetes mellitus. Current diabetic treatment includes oral agent (monotherapy). She is compliant with treatment most of the time. Her weight is decreasing steadily. She is following a diabetic diet. Meal planning includes ADA exchanges, avoidance of concentrated sweets and carbohydrate counting. She has not had a previous visit with a dietitian. She participates in exercise three times a week. She monitors blood glucose at home 3-4 x per week. She does not see a podiatrist.Eye exam is current.     Review of Systems   Constitutional: Negative for diaphoresis, fatigue and weight loss.   Eyes: Negative for blurred vision.   Respiratory: Negative for cough and shortness of breath.    Cardiovascular: Negative for chest pain and palpitations.   Endocrine: Negative for polydipsia, polyphagia and polyuria.   Genitourinary: Negative for impotence.   Skin: Negative for color change and pallor.   Neurological: Negative for dizziness, tremors, seizures, speech difficulty, weakness and headaches.   Psychiatric/Behavioral: Negative for confusion. The patient is not nervous/anxious.        Objective:    /84, Pulse 83, Ht 5' 7", Wt 181 lbs (from 200.6)  Physical Exam   Constitutional: She is oriented to person, place, and time. She appears well-developed and well-nourished.   Cardiovascular: Normal rate, regular rhythm and normal heart sounds.    Pulmonary/Chest: Effort normal and breath sounds normal. No respiratory distress. "   Musculoskeletal: Normal range of motion. She exhibits no edema.   Neurological: She is alert and oriented to person, place, and time. No cranial nerve deficit.   Skin: Skin is warm and dry.   Psychiatric: She has a normal mood and affect. Her behavior is normal.       Assessment:       1. Type 2 diabetes mellitus without complication, without long-term current use of insulin    2. Hypothyroidism, unspecified type        Plan:       Type 2 diabetes mellitus without complication, without long-term current use of insulin  -     Hemoglobin A1c today, consider decreasing Metformin ER from 750 to 500mg daily for maintenance.     Hypothyroidism, unspecified type - clinically stable, continue same.

## 2017-11-20 ENCOUNTER — PATIENT MESSAGE (OUTPATIENT)
Dept: INTERNAL MEDICINE | Facility: CLINIC | Age: 37
End: 2017-11-20

## 2017-11-20 DIAGNOSIS — E11.9 TYPE 2 DIABETES MELLITUS WITHOUT COMPLICATION, WITHOUT LONG-TERM CURRENT USE OF INSULIN: ICD-10-CM

## 2017-11-20 RX ORDER — METFORMIN HYDROCHLORIDE 500 MG/1
500 TABLET, EXTENDED RELEASE ORAL DAILY
Qty: 90 TABLET | Refills: 1 | Status: SHIPPED | OUTPATIENT
Start: 2017-11-20 | End: 2018-05-31

## 2017-12-20 RX ORDER — NORGESTIMATE AND ETHINYL ESTRADIOL 7DAYSX3 28
KIT ORAL
Qty: 84 TABLET | Refills: 3 | Status: SHIPPED | OUTPATIENT
Start: 2017-08-18 | End: 2018-09-01

## 2018-05-02 DIAGNOSIS — E11.9 TYPE 2 DIABETES MELLITUS WITHOUT COMPLICATION: ICD-10-CM

## 2018-05-31 DIAGNOSIS — E03.9 HYPOTHYROIDISM, UNSPECIFIED TYPE: ICD-10-CM

## 2018-05-31 DIAGNOSIS — E11.9 TYPE 2 DIABETES MELLITUS WITHOUT COMPLICATION, WITHOUT LONG-TERM CURRENT USE OF INSULIN: ICD-10-CM

## 2018-05-31 RX ORDER — METFORMIN HYDROCHLORIDE 500 MG/1
500 TABLET, EXTENDED RELEASE ORAL DAILY
Qty: 90 TABLET | Refills: 1 | Status: SHIPPED | OUTPATIENT
Start: 2018-05-31 | End: 2018-12-28 | Stop reason: SDUPTHER

## 2018-05-31 RX ORDER — LEVOTHYROXINE SODIUM 125 UG/1
125 TABLET ORAL DAILY
Qty: 90 TABLET | Refills: 1 | Status: SHIPPED | OUTPATIENT
Start: 2018-05-31 | End: 2018-12-12

## 2018-06-04 ENCOUNTER — TELEPHONE (OUTPATIENT)
Dept: OBSTETRICS AND GYNECOLOGY | Facility: CLINIC | Age: 38
End: 2018-06-04

## 2018-06-04 DIAGNOSIS — Z12.31 VISIT FOR SCREENING MAMMOGRAM: Primary | ICD-10-CM

## 2018-06-04 NOTE — TELEPHONE ENCOUNTER
----- Message from Madai Chauhan sent at 6/4/2018  3:32 PM CDT -----  Contact: self   Pt is needing to be scheduled for a WWE and a mammogram appt. The pt can be reached at [phone].

## 2018-06-04 NOTE — TELEPHONE ENCOUNTER
winter ordered           [facility]

## 2018-06-06 ENCOUNTER — OFFICE VISIT (OUTPATIENT)
Dept: OPHTHALMOLOGY | Facility: CLINIC | Age: 38
End: 2018-06-06
Payer: COMMERCIAL

## 2018-06-06 VITALS — HEART RATE: 77 BPM | DIASTOLIC BLOOD PRESSURE: 78 MMHG | SYSTOLIC BLOOD PRESSURE: 116 MMHG

## 2018-06-06 DIAGNOSIS — E11.9 DIABETES MELLITUS TYPE 2 WITHOUT RETINOPATHY: ICD-10-CM

## 2018-06-06 DIAGNOSIS — H33.322 RETINAL HOLE OF LEFT EYE: Primary | ICD-10-CM

## 2018-06-06 DIAGNOSIS — H35.412 LATTICE DEGENERATION OF LEFT RETINA: ICD-10-CM

## 2018-06-06 PROCEDURE — 92014 COMPRE OPH EXAM EST PT 1/>: CPT | Mod: S$GLB,,, | Performed by: OPHTHALMOLOGY

## 2018-06-06 PROCEDURE — 99999 PR PBB SHADOW E&M-EST. PATIENT-LVL III: CPT | Mod: PBBFAC,,, | Performed by: OPHTHALMOLOGY

## 2018-06-06 PROCEDURE — 92226 PR SPECIAL EYE EXAM, SUBSEQUENT: CPT | Mod: LT,S$GLB,, | Performed by: OPHTHALMOLOGY

## 2018-06-06 NOTE — PROGRESS NOTES
HPI     DLS 06/19/17   38 Y/ O F here today for her 1 yr Retinal Hole OS ck. PT   has been a Type II diabetic x 1 1/2 Years stj     No f/f/pain OU.  Va stable and VF full OU.    LBS  97 at 5:30a today 06/06/18     POHx:   1. Retinal Hole OS  2 Hx  Diabetes          Last edited by Florian Mi MD on 6/6/2018  3:47 PM. (History)        OCT WNL OU-documentation only    Assessment /Plan     For exam results, see Encounter Report.    Retinal hole of left eye  -     OCT- Retina    Diabetes mellitus type 2 without retinopathy    Lattice degeneration of left retina    BS/BP/Chol control    Pathology of PVD, Retinal Tear, Retinal Detachment reviewed in great detail  RD precautions discussed in detail, patient expressed understanding  RTC 6 months, sooner PRN (especially ANY change flashes, floaters, vision, visual field)

## 2018-06-07 DIAGNOSIS — E11.9 TYPE 2 DIABETES MELLITUS WITHOUT COMPLICATION: ICD-10-CM

## 2018-06-15 ENCOUNTER — HOSPITAL ENCOUNTER (OUTPATIENT)
Dept: RADIOLOGY | Facility: HOSPITAL | Age: 38
Discharge: HOME | End: 2018-06-15
Attending: OBSTETRICS & GYNECOLOGY
Payer: COMMERCIAL

## 2018-06-15 VITALS — WEIGHT: 180 LBS | BODY MASS INDEX: 28.19 KG/M2

## 2018-06-15 DIAGNOSIS — Z12.31 VISIT FOR SCREENING MAMMOGRAM: ICD-10-CM

## 2018-06-15 PROCEDURE — 77067 SCR MAMMO BI INCL CAD: CPT | Mod: TC

## 2018-06-15 PROCEDURE — 77063 BREAST TOMOSYNTHESIS BI: CPT | Mod: 26,,, | Performed by: RADIOLOGY

## 2018-06-15 PROCEDURE — 77067 SCR MAMMO BI INCL CAD: CPT | Mod: 26,,, | Performed by: RADIOLOGY

## 2018-06-19 ENCOUNTER — PATIENT MESSAGE (OUTPATIENT)
Dept: OBSTETRICS AND GYNECOLOGY | Facility: CLINIC | Age: 38
End: 2018-06-19

## 2018-06-19 DIAGNOSIS — Z91.89 INCREASED RISK OF BREAST CANCER: Primary | ICD-10-CM

## 2018-06-19 NOTE — TELEPHONE ENCOUNTER
Patient has been notified of normal mammogram and ELEVATED Tyrer Cara score.     She has been notified of additional recommendation of breast center referral to discuss need for screening MRI.     Please schedule appt with breast surgeon. Referral is in.

## 2018-06-20 ENCOUNTER — PATIENT MESSAGE (OUTPATIENT)
Dept: OBSTETRICS AND GYNECOLOGY | Facility: CLINIC | Age: 38
End: 2018-06-20

## 2018-06-21 ENCOUNTER — OFFICE VISIT (OUTPATIENT)
Dept: OBSTETRICS AND GYNECOLOGY | Facility: CLINIC | Age: 38
End: 2018-06-21
Payer: COMMERCIAL

## 2018-06-21 VITALS
HEIGHT: 67 IN | BODY MASS INDEX: 29.76 KG/M2 | DIASTOLIC BLOOD PRESSURE: 72 MMHG | WEIGHT: 189.63 LBS | SYSTOLIC BLOOD PRESSURE: 120 MMHG

## 2018-06-21 DIAGNOSIS — Z01.419 ENCOUNTER FOR GYNECOLOGICAL EXAMINATION: Primary | ICD-10-CM

## 2018-06-21 DIAGNOSIS — Z12.4 PAP SMEAR FOR CERVICAL CANCER SCREENING: ICD-10-CM

## 2018-06-21 PROCEDURE — 99999 PR PBB SHADOW E&M-EST. PATIENT-LVL IV: CPT | Mod: PBBFAC,,, | Performed by: OBSTETRICS & GYNECOLOGY

## 2018-06-21 PROCEDURE — 99395 PREV VISIT EST AGE 18-39: CPT | Mod: S$GLB,,, | Performed by: OBSTETRICS & GYNECOLOGY

## 2018-06-21 PROCEDURE — 88175 CYTOPATH C/V AUTO FLUID REDO: CPT

## 2018-06-21 NOTE — PROGRESS NOTES
HPI: Pt is a 38 y.o.  female who presents for routine annual exam. She uses Ortho Tri Cyclen for contraception. She does not want STD screening. Only complaints is intermenstrual spotting for the past 2 months. Has been on this pill for 15 years. Says spotting isn't bad just annoying.       ROS:  GENERAL: Feeling well overall. Denies fever or chills.   SKIN: Denies rash or lesions.   HEAD: Denies head injury or headache.   NODES: Denies enlarged lymph nodes.   CHEST: Denies chest pain or shortness of breath.   CARDIOVASCULAR: Denies palpitations or left sided chest pain.   ABDOMEN: No abdominal pain, constipation, diarrhea, nausea, vomiting or rectal bleeding.   URINARY: No dysuria, hematuria, or burning on urination.  REPRODUCTIVE: See HPI.   BREASTS: Denies pain, lumps, or nipple discharge.   HEMATOLOGIC: No easy bruisability or excessive bleeding.   MUSCULOSKELETAL: Denies joint pain or swelling.   NEUROLOGIC: Denies syncope or weakness.   PSYCHIATRIC: Denies depression, anxiety or mood swings.    PE:   APPEARANCE: Well nourished, well developed, White female in no acute distress.  NODES: no cervical, supraclavicular, or inguinal lymphadenopathy  BREASTS: Symmetrical, no skin changes or visible lesions. No palpable masses, nipple discharge or adenopathy bilaterally.  ABDOMEN: Soft. No tenderness or masses. No distention. No hernias palpated. No CVA tenderness.  VULVA: No lesions. Normal external female genitalia.  URETHRAL MEATUS: Normal size and location, no lesions, no prolapse.  URETHRA: No masses, tenderness, or prolapse.  VAGINA: Moist. No lesions or lacerations noted. No abnormal discharge present. No odor present.   CERVIX: No lesions or discharge. No cervical motion tenderness.   UTERUS: Normal size, regular shape, mobile, non-tender.  ADNEXA: No tenderness. No fullness or masses palpated in the adnexal regions.   ANUS PERINEUM: Normal.      Diagnosis:  1. Encounter for gynecological examination    2. Pap  smear for cervical cancer screening        Plan:     Orders Placed This Encounter    Liquid-based pap smear, screening     - will email me if spotting persists and we can change her to Ortho Cyclen. Otherwise, expectant management for now as it may resolve on its own  - appt with Dr. Rodriguez scheduled for elevated Tyrer Cuzick.     Patient was counseled today on the new ACS guidelines for cervical cytology screening as well as the current recommendations for breast cancer screening. She was counseled to follow up with her PCP for other routine health maintenance. Counseling session lasted approximately 10 minutes, and all her questions were answered.    Follow-up with me in 1 year for routine exam; pap in 3 years.

## 2018-08-16 ENCOUNTER — INITIAL CONSULT (OUTPATIENT)
Dept: SURGERY | Facility: CLINIC | Age: 38
End: 2018-08-16
Attending: SURGERY
Payer: COMMERCIAL

## 2018-08-16 VITALS
WEIGHT: 189.63 LBS | SYSTOLIC BLOOD PRESSURE: 122 MMHG | HEART RATE: 87 BPM | BODY MASS INDEX: 29.76 KG/M2 | HEIGHT: 67 IN | DIASTOLIC BLOOD PRESSURE: 68 MMHG

## 2018-08-16 DIAGNOSIS — Z91.89 AT HIGH RISK FOR BREAST CANCER: ICD-10-CM

## 2018-08-16 PROCEDURE — 99204 OFFICE O/P NEW MOD 45 MIN: CPT | Mod: S$GLB,,, | Performed by: SURGERY

## 2018-08-16 PROCEDURE — 3008F BODY MASS INDEX DOCD: CPT | Mod: CPTII,S$GLB,, | Performed by: SURGERY

## 2018-08-16 NOTE — LETTER
August 23, 2018      Yaritza Oden MD  [address]  [facility] [address]           [facility]  [address], [address]  [facility] [address]  Phone: [phone]  Fax: [phone]          Patient: Josefina Mark   MR Number: [id number]   YOB: 1980   Date of Visit: 8/16/2018       Dear Dr. Yaritza Oden:    Thank you for referring Josefina Mark to me for evaluation. Attached you will find relevant portions of my assessment and plan of care.    If you have questions, please do not hesitate to call me. I look forward to following Josefina Mark along with you.    Sincerely,    Aissatou Cruz  CC:  No Recipients    If you would like to receive this communication electronically, please contact [email or web address] or [phone] to request more information on [employer] Link access.    For providers and/or their staff who would like to refer a patient to [facility], please contact us through our one-stop-shop provider referral line, [facility], at [phone].    If you feel you have received this communication in error or would no longer like to receive these types of communications, please e-mail [email or web address]

## 2018-08-28 NOTE — PROGRESS NOTES
BREAST HIGH RISK CLINIC  [facility]  Breast Surgery      Date of Visit:  2018    Referring provider:  Yaritza Oden MD  [address] [address]    PCP:  Lida Garcia MD    HIGH RISK    Josefina Mark is a 38 y.o. premenopausal female referred for evaluation of increased risk of breast cancer based on family history  With TC score 28%.  Here today to discussed options of high risk screening and risk reduction.    Other breast cancer risk factors include family hx on mother's side and mom with breast CA.     Age of menarche was 13. Patient denies hormonal therapy. Reports OCP x 15 years.    Patient is .  Age of first live birth was 18.  Patient did breast feed x 1 month.       Family History is significant for the following:  Mother breast cancer 68, doing well  Maternal aunt breast cancer x 2:  Age 49, bilateral 62, .  Age 72, doing well  Maternal GM breast cancer 64,  66 (bilateral)      Social History:   Smoking:  none  Drinking:  social    The following portions of the patient's history were reviewed and updated as appropriate: She  has a past medical history of Diabetes mellitus, type 2, Fever blister, and Hypothyroidism.  She does not have any pertinent problems on file.  She  has no past surgical history on file.  Her family history includes Breast cancer (age of onset: 49) in her maternal aunt; Breast cancer (age of onset: 65) in her maternal grandmother; Breast cancer (age of onset: 68) in her mother; Breast cancer (age of onset: 72) in her maternal aunt; Cancer in her father; Colon cancer in her paternal aunt; Diabetes in her paternal grandmother; Glaucoma in her paternal grandmother; Hypertension in her paternal grandmother; Liver cancer in her maternal grandfather.  She  reports that  has never smoked. she has never used smokeless tobacco. She reports that she does not drink alcohol or use drugs.  She has a current medication  "list which includes the following prescription(s): blood sugar diagnostic, lancets, levothyroxine, metformin, tri-sprintec (28), vitamin d2, and blood-glucose meter.  She has No Known Allergies..    Review of Systems  Pertinent items are noted in HPI.      Objective:   /68 (BP Location: Right arm, Patient Position: Sitting, BP Method: Large (Automatic))   Pulse 87   Ht 5' 7" (1.702 m)   Wt 86 kg (189 lb 9.5 oz)   BMI 29.69 kg/m²     General: NAD  Chest: nonlabored breathing, no obvious deformity  Heart: regular rate  Abdomen: soft, nondistended  Extremities: no edema noted  Breasts: breasts appear normal, no suspicious masses, no skin or nipple changes or axillary nodes.    Imaging  Mammograms:  6/15/2018 negative       Assessment:     This is a 38 y.o. female with an increased risk of breast cancer based on TC score 28%.        Plan:   Discussed risk models can vary based on the model used.  There are several models available and we currently use TC model for our patients with family history.  Based on this, the patient's risk exceeds 20%.  Patients with lifetime risk over 20% qualify for increased screening with MRI, in addition to mammograms.  We also would perform breast exams every 6 months.  Discussed pros and cons of MRI screening.    We also discussed risk reduction options. Discussed that we recommend a healthy lifestyle.      Nutrition we recommend fresh fruits and vegetables and lean meats and avoidance of processed foods.    Exercise I recommend at least 30 minutes of cardiovascular exercise 4-5 times per week, even walking would have benefit.  Discussed that exercise can lower the relative risk of breast cancer by about 18-20%.  Also discussed that obesity is linked to higher risk of breast cancer, therefore exercise in important.    Discussed alcohol use and some studies suggest that increase alcohol intake may increase breast cancer risk.  Therefore, I do recommend limited alcohol " intake.    Also discussed risk factors that are not modifiable, such as age at menarche, age at menopause, age at first pregnancy, and family history.     We did discuss hormone replacement therapy as well.  In patients at increased risk, I usually do not recommend HRT for long periods of time.      Discussed briefly risk reduction options with chemoprevention, such as Tamoxifen or Raloxifene.  These have been shown to lower the risk of breast cancer incidence, however have not been shown to improve survival in patients who do not have a breast cancer.    I will see the patient back in 6 months with a MRI. Refer to genetics

## 2018-09-02 RX ORDER — NORGESTIMATE AND ETHINYL ESTRADIOL 7DAYSX3 28
KIT ORAL
Qty: 84 TABLET | Refills: 3 | Status: SHIPPED | OUTPATIENT
Start: 2018-09-02 | End: 2018-10-31

## 2018-09-04 DIAGNOSIS — E55.9 VITAMIN D INSUFFICIENCY: ICD-10-CM

## 2018-09-04 RX ORDER — ERGOCALCIFEROL 1.25 MG/1
CAPSULE ORAL
Qty: 12 CAPSULE | Refills: 1 | Status: SHIPPED | OUTPATIENT
Start: 2018-09-04 | End: 2019-01-31 | Stop reason: ALTCHOICE

## 2018-10-22 ENCOUNTER — TELEPHONE (OUTPATIENT)
Dept: OBSTETRICS AND GYNECOLOGY | Facility: CLINIC | Age: 38
End: 2018-10-22

## 2018-10-22 DIAGNOSIS — N91.2 AMENORRHEA: Primary | ICD-10-CM

## 2018-10-22 NOTE — TELEPHONE ENCOUNTER
Date of Positive Preg Test: 10/19/18     1st day of Last Menstrual Cycle: 09/02/18     List Any Difficulties: pt states she currently takes diabetes medication and is having questions in regards to is it still safe to take the RX while being pregnant. Pt also states she is leaving town tomorrow for a week and do not have any prenatal vitamins as well. Pt is needing to speak with staff     What Number to Call Back: [phone]

## 2018-10-22 NOTE — TELEPHONE ENCOUNTER
Advised per dr Oden that metformin is fine    OTC prenatals are fine          Set new ob visits and ultrasound

## 2018-10-31 ENCOUNTER — LAB VISIT (OUTPATIENT)
Dept: LAB | Facility: OTHER | Age: 38
End: 2018-10-31
Payer: COMMERCIAL

## 2018-10-31 ENCOUNTER — PROCEDURE VISIT (OUTPATIENT)
Dept: OBSTETRICS AND GYNECOLOGY | Facility: CLINIC | Age: 38
End: 2018-10-31
Payer: COMMERCIAL

## 2018-10-31 ENCOUNTER — OFFICE VISIT (OUTPATIENT)
Dept: OBSTETRICS AND GYNECOLOGY | Facility: CLINIC | Age: 38
End: 2018-10-31
Payer: COMMERCIAL

## 2018-10-31 VITALS
SYSTOLIC BLOOD PRESSURE: 112 MMHG | WEIGHT: 203.69 LBS | BODY MASS INDEX: 31.97 KG/M2 | DIASTOLIC BLOOD PRESSURE: 70 MMHG | HEIGHT: 67 IN

## 2018-10-31 DIAGNOSIS — Z32.01 POSITIVE PREGNANCY TEST: ICD-10-CM

## 2018-10-31 DIAGNOSIS — O24.119 TYPE 2 DIABETES MELLITUS DURING PREGNANCY, ANTEPARTUM: ICD-10-CM

## 2018-10-31 DIAGNOSIS — Z36.89 ESTABLISH GESTATIONAL AGE, ULTRASOUND: ICD-10-CM

## 2018-10-31 DIAGNOSIS — O36.80X0 ENCOUNTER TO DETERMINE FETAL VIABILITY OF PREGNANCY, SINGLE OR UNSPECIFIED FETUS: ICD-10-CM

## 2018-10-31 DIAGNOSIS — E03.9 HYPOTHYROIDISM AFFECTING PREGNANCY, ANTEPARTUM: ICD-10-CM

## 2018-10-31 DIAGNOSIS — O99.280 HYPOTHYROIDISM AFFECTING PREGNANCY, ANTEPARTUM: ICD-10-CM

## 2018-10-31 DIAGNOSIS — N91.5 OLIGOMENORRHEA, UNSPECIFIED TYPE: ICD-10-CM

## 2018-10-31 DIAGNOSIS — E55.9 VITAMIN D DEFICIENCY: ICD-10-CM

## 2018-10-31 DIAGNOSIS — Z23 NEEDS FLU SHOT: ICD-10-CM

## 2018-10-31 DIAGNOSIS — N91.5 OLIGOMENORRHEA, UNSPECIFIED TYPE: Primary | ICD-10-CM

## 2018-10-31 DIAGNOSIS — N91.2 AMENORRHEA: ICD-10-CM

## 2018-10-31 DIAGNOSIS — O09.529 ANTEPARTUM MULTIGRAVIDA OF ADVANCED MATERNAL AGE: ICD-10-CM

## 2018-10-31 DIAGNOSIS — Z34.00 SUPERVISION OF NORMAL FIRST PREGNANCY, ANTEPARTUM: ICD-10-CM

## 2018-10-31 LAB
25(OH)D3+25(OH)D2 SERPL-MCNC: 15 NG/ML
ABO + RH BLD: NORMAL
ALBUMIN SERPL BCP-MCNC: 3.7 G/DL
ALP SERPL-CCNC: 46 U/L
ALT SERPL W/O P-5'-P-CCNC: 14 U/L
ANION GAP SERPL CALC-SCNC: 10 MMOL/L
AST SERPL-CCNC: 13 U/L
B-HCG UR QL: POSITIVE
BASOPHILS # BLD AUTO: 0.03 K/UL
BASOPHILS NFR BLD: 0.4 %
BILIRUB SERPL-MCNC: 0.4 MG/DL
BLD GP AB SCN CELLS X3 SERPL QL: NORMAL
BLD GP AB SCN CELLS X3 SERPL QL: NORMAL
BUN SERPL-MCNC: 7 MG/DL
CALCIUM SERPL-MCNC: 9.4 MG/DL
CHLORIDE SERPL-SCNC: 104 MMOL/L
CO2 SERPL-SCNC: 23 MMOL/L
CREAT SERPL-MCNC: 0.7 MG/DL
CREAT UR-MCNC: 94 MG/DL
CTP QC/QA: YES
DIFFERENTIAL METHOD: ABNORMAL
EOSINOPHIL # BLD AUTO: 0.1 K/UL
EOSINOPHIL NFR BLD: 1.3 %
ERYTHROCYTE [DISTWIDTH] IN BLOOD BY AUTOMATED COUNT: 13 %
EST. GFR  (AFRICAN AMERICAN): >60 ML/MIN/1.73 M^2
EST. GFR  (NON AFRICAN AMERICAN): >60 ML/MIN/1.73 M^2
ESTIMATED AVG GLUCOSE: 108 MG/DL
GLUCOSE SERPL-MCNC: 87 MG/DL
HBA1C MFR BLD HPLC: 5.4 %
HCT VFR BLD AUTO: 36.6 %
HGB BLD-MCNC: 11.9 G/DL
LDH SERPL L TO P-CCNC: 158 U/L
LYMPHOCYTES # BLD AUTO: 1.6 K/UL
LYMPHOCYTES NFR BLD: 23.7 %
MCH RBC QN AUTO: 29.3 PG
MCHC RBC AUTO-ENTMCNC: 32.5 G/DL
MCV RBC AUTO: 90 FL
MONOCYTES # BLD AUTO: 0.4 K/UL
MONOCYTES NFR BLD: 6.1 %
NEUTROPHILS # BLD AUTO: 4.6 K/UL
NEUTROPHILS NFR BLD: 68.4 %
PLATELET # BLD AUTO: 269 K/UL
PMV BLD AUTO: 10.2 FL
POTASSIUM SERPL-SCNC: 3.7 MMOL/L
PROT SERPL-MCNC: 7.3 G/DL
PROT UR-MCNC: 7 MG/DL
PROT/CREAT UR: 0.07 MG/G{CREAT}
RBC # BLD AUTO: 4.06 M/UL
SODIUM SERPL-SCNC: 137 MMOL/L
T4 FREE SERPL-MCNC: 1.04 NG/DL
TSH SERPL DL<=0.005 MIU/L-ACNC: 3.6 UIU/ML
WBC # BLD AUTO: 6.71 K/UL

## 2018-10-31 PROCEDURE — 86592 SYPHILIS TEST NON-TREP QUAL: CPT

## 2018-10-31 PROCEDURE — 76801 OB US < 14 WKS SINGLE FETUS: CPT | Mod: S$GLB,,, | Performed by: OBSTETRICS & GYNECOLOGY

## 2018-10-31 PROCEDURE — 86850 RBC ANTIBODY SCREEN: CPT | Mod: 91

## 2018-10-31 PROCEDURE — 87106 FUNGI IDENTIFICATION YEAST: CPT

## 2018-10-31 PROCEDURE — 87086 URINE CULTURE/COLONY COUNT: CPT

## 2018-10-31 PROCEDURE — 99999 PR PBB SHADOW E&M-EST. PATIENT-LVL III: CPT | Mod: PBBFAC,,, | Performed by: NURSE PRACTITIONER

## 2018-10-31 PROCEDURE — 3044F HG A1C LEVEL LT 7.0%: CPT | Mod: CPTII,S$GLB,, | Performed by: NURSE PRACTITIONER

## 2018-10-31 PROCEDURE — 87340 HEPATITIS B SURFACE AG IA: CPT

## 2018-10-31 PROCEDURE — 86703 HIV-1/HIV-2 1 RESULT ANTBDY: CPT

## 2018-10-31 PROCEDURE — 85025 COMPLETE CBC W/AUTO DIFF WBC: CPT

## 2018-10-31 PROCEDURE — 99214 OFFICE O/P EST MOD 30 MIN: CPT | Mod: S$GLB,,, | Performed by: NURSE PRACTITIONER

## 2018-10-31 PROCEDURE — 84156 ASSAY OF PROTEIN URINE: CPT

## 2018-10-31 PROCEDURE — 87088 URINE BACTERIA CULTURE: CPT

## 2018-10-31 PROCEDURE — 84443 ASSAY THYROID STIM HORMONE: CPT

## 2018-10-31 PROCEDURE — 83036 HEMOGLOBIN GLYCOSYLATED A1C: CPT

## 2018-10-31 PROCEDURE — 86762 RUBELLA ANTIBODY: CPT

## 2018-10-31 PROCEDURE — 83615 LACTATE (LD) (LDH) ENZYME: CPT

## 2018-10-31 PROCEDURE — 36415 COLL VENOUS BLD VENIPUNCTURE: CPT

## 2018-10-31 PROCEDURE — 87491 CHLMYD TRACH DNA AMP PROBE: CPT

## 2018-10-31 PROCEDURE — 80053 COMPREHEN METABOLIC PANEL: CPT

## 2018-10-31 PROCEDURE — 3008F BODY MASS INDEX DOCD: CPT | Mod: CPTII,S$GLB,, | Performed by: NURSE PRACTITIONER

## 2018-10-31 PROCEDURE — 84439 ASSAY OF FREE THYROXINE: CPT

## 2018-10-31 PROCEDURE — 82306 VITAMIN D 25 HYDROXY: CPT

## 2018-10-31 PROCEDURE — 81025 URINE PREGNANCY TEST: CPT | Mod: S$GLB,,, | Performed by: NURSE PRACTITIONER

## 2018-10-31 PROCEDURE — 86850 RBC ANTIBODY SCREEN: CPT

## 2018-10-31 NOTE — PROCEDURES
Obstetrical ultrasound completed today.  See report in imaging section of [facility].

## 2018-10-31 NOTE — PROGRESS NOTES
"  CC: Positive Pregnancy Test    HISTORY OF PRESENT ILLNESS:    Josefina Mark is a 38 y.o. female, ,  Presents today for a routine exam complaining of amenorrhea and positive home urine pregnancy test.  Patient's last menstrual period was 2018 (approximate).   She is not currently on any contraception.  Reports nausea. Reports breast tenderness. Denies vaginal bleeding and reports spotting.  Reports family h/o Down's syndrome (maternal uncle- who is ).  Medical h/o Type 2 DM (on Metformin 500 mg daily), hypothyroidism (on Synthroid 150 mcg daily), and Vitamin D deficiency.   Works RN at [employer].         ROS:  GENERAL: No weight changes. No swelling. No fatigue. No fever.  CARDIOVASCULAR: No chest pain. No shortness of breath. No leg cramps.   NEUROLOGICAL: No headaches. No vision changes.  BREASTS: No pain. No lumps. No discharge.  ABDOMEN: No pain. No diarrhea. No constipation.  REPRODUCTIVE: No abnormal bleeding.   VULVA: No pain. No lesions. No itching.  VAGINA: No relaxation. No itching. No odor. No discharge. No lesions.  URINARY: No incontinence. No nocturia. No frequency. No dysuria.    MEDICATIONS AND ALLERGIES:  Reviewed        COMPREHENSIVE GYN HISTORY:  PAP History: Denies abnormal Paps.  Infection History: Denies STDs. Denies PID.  Benign History: Denies uterine fibroids. Denies ovarian cysts. Denies endometriosis. Denies other conditions.  Cancer History: Denies cervical cancer. Denies uterine cancer or hyperplasia. Denies ovarian cancer. Denies vulvar cancer or pre-cancer. Denies vaginal cancer or pre-cancer. Denies breast cancer. Denies colon cancer.  Sexual Activity History: Reports currently being sexually active  Menstrual History: None.  Contraception: None    /70 (BP Location: Left arm, Patient Position: Sitting, BP Method: Medium (Manual))   Ht 5' 7" (1.702 m)   Wt 92.4 kg (203 lb 11.3 oz)   LMP 2018 (Approximate)   BMI 31.90 kg/m² "     PE:  AFFECT: Calm, alert and oriented X 3. Interactive during exam  GENERAL: Appears well-nourished, well-developed, in no acute distress.  HEAD: Normocephalic, atruamatic  TEETH: Good dentition.  THYROID: No thyromegally   BREASTS: No masses, skin changes, nipple discharge or adenopathy bilaterally.  SKIN: Normal for race, warm, & dry. No lesions or rashes.  LUNGS: Easy and unlabored, clear to auscultation bilaterally.  HEART: Regular rate and rhythm   ABDOMEN: Soft and nontender without masses or organomegally.  VULVA: No lesions, masses or tenderness.  VAGINA: Moist and well rugated without lesions or discharge.  CERVIX: Moist and pink without lesions, discharge or tenderness.      UTERUS SIZE: 8 week size, nontender and without masses.  ADNEXA: No masses or tenderness.  ESTIMATE OF PELVIC CAPACITY: Adequate  EXTREMITIES: No cyanosis, clubbing or edema. No calf tenderness.  LYMPH NODES: No axillary or inguinal adenopathy.    PROCEDURES:  UPT Positive  Genprobe  Pap      ASSESSMENT/PLAN:  Amenorrhea  Positive urine pregnancy test (CHANG: 19, EGA: 8w3d based on LMP)    -  Routine prenatal care    Nausea and vomiting in pregnancy    -  Education regarding lifestyle and dietary modifications    -  Advised use of B6/Unisom. Pt will notify us if no relief/worsening symptoms, will consider Zofran if needed.      1st TRIMESTER COUNSELING: Discussed all, booklet provided:  Common complaints of pregnancy  HIV and other routine prenatal tests including  genetic screening  Risk factors identified by prenatal history  Oriented to practice - discussed anticipated course of prenatal care & indications for Ultrasound  Childbirth classes/Hospital facilities   Nutrition and weight gain counseling  Toxoplasmosis precautions (Cats/Raw Meat)  Sexual activity and exercise  Environmental/Work hazards  Travel  Tobacco (Ask, Advise, Assess, Assist, and Arrange), as well as alcohol and drug use  Use of any medications  (Including supplements, Vitamins, Herbs, or OTC Drugs)  Domestic violence  Seat belt use      TERATOLOGY COUNSELING:   Discussed indications and options for aneuploidy screening - pamphlets given    -  Pt is undecided on genetic testing- will contact clinic if she desires genetic testing    Dating US later today   FOLLOW-UP in 4 weeks with Dr. Cecille Jimenez, NP    OB/GYN

## 2018-11-01 LAB
C TRACH DNA SPEC QL NAA+PROBE: NOT DETECTED
HBV SURFACE AG SERPL QL IA: NEGATIVE
HIV 1+2 AB+HIV1 P24 AG SERPL QL IA: NEGATIVE
N GONORRHOEA DNA SPEC QL NAA+PROBE: NOT DETECTED
RPR SER QL: NORMAL
RUBV IGG SER-ACNC: 22.4 IU/ML
RUBV IGG SER-IMP: REACTIVE

## 2018-11-02 ENCOUNTER — PATIENT MESSAGE (OUTPATIENT)
Dept: OBSTETRICS AND GYNECOLOGY | Facility: CLINIC | Age: 38
End: 2018-11-02

## 2018-11-02 DIAGNOSIS — E55.9 VITAMIN D DEFICIENCY: Primary | ICD-10-CM

## 2018-11-02 LAB — BACTERIA UR CULT: NORMAL

## 2018-11-05 ENCOUNTER — PATIENT MESSAGE (OUTPATIENT)
Dept: OBSTETRICS AND GYNECOLOGY | Facility: CLINIC | Age: 38
End: 2018-11-05

## 2018-11-05 RX ORDER — VIT C/E/ZN/COPPR/LUTEIN/ZEAXAN 250MG-90MG
1000 CAPSULE ORAL DAILY
Qty: 30 CAPSULE | Refills: 1 | Status: ON HOLD | OUTPATIENT
Start: 2018-11-05 | End: 2019-05-22 | Stop reason: HOSPADM

## 2018-11-18 ENCOUNTER — PATIENT MESSAGE (OUTPATIENT)
Dept: OBSTETRICS AND GYNECOLOGY | Facility: CLINIC | Age: 38
End: 2018-11-18

## 2018-11-27 ENCOUNTER — TELEPHONE (OUTPATIENT)
Dept: OBSTETRICS AND GYNECOLOGY | Facility: CLINIC | Age: 38
End: 2018-11-27

## 2018-11-27 ENCOUNTER — ROUTINE PRENATAL (OUTPATIENT)
Dept: OBSTETRICS AND GYNECOLOGY | Facility: CLINIC | Age: 38
End: 2018-11-27
Payer: COMMERCIAL

## 2018-11-27 VITALS
DIASTOLIC BLOOD PRESSURE: 78 MMHG | SYSTOLIC BLOOD PRESSURE: 110 MMHG | WEIGHT: 204.56 LBS | BODY MASS INDEX: 32.04 KG/M2

## 2018-11-27 DIAGNOSIS — Z34.80 SUPERVISION OF OTHER NORMAL PREGNANCY, ANTEPARTUM: Primary | ICD-10-CM

## 2018-11-27 DIAGNOSIS — O09.522 AMA (ADVANCED MATERNAL AGE) MULTIGRAVIDA 35+, SECOND TRIMESTER: ICD-10-CM

## 2018-11-27 DIAGNOSIS — Z36.9 ANTENATAL SCREENING ENCOUNTER: ICD-10-CM

## 2018-11-27 DIAGNOSIS — O24.112 PRE-EXISTING TYPE 2 DIABETES MELLITUS DURING PREGNANCY IN SECOND TRIMESTER: ICD-10-CM

## 2018-11-27 PROCEDURE — 99999 PR PBB SHADOW E&M-EST. PATIENT-LVL III: CPT | Mod: PBBFAC,,, | Performed by: OBSTETRICS & GYNECOLOGY

## 2018-11-27 PROCEDURE — 0500F INITIAL PRENATAL CARE VISIT: CPT | Mod: S$GLB,,, | Performed by: OBSTETRICS & GYNECOLOGY

## 2018-11-27 NOTE — TELEPHONE ENCOUNTER
Notified pt of Negative KjariorO23 results- negative for trisomy 21, 18, and 13.  She does not want to know the gender at this time.  Pt verbalized understanding.

## 2018-11-27 NOTE — PROGRESS NOTES
Doing well. No complaints. T21 pending. Anatomy sono ordered. Pt is a Type 2 diabetic on Metformin 500 mg daily. Very well controlled with most recent HgA1C 5.4. Discussed checking fasting and 2 hour PP sugars. She will bring log to next visit. She understands that she will need fetal echo at 22 weeks. Last pregnancy was 11 years ago. She has had 3 uncomplicated SVDs. Largest baby 9# 14 oz.  RTC 4 weeks.

## 2018-12-12 DIAGNOSIS — E03.9 HYPOTHYROIDISM, UNSPECIFIED TYPE: ICD-10-CM

## 2018-12-13 RX ORDER — LEVOTHYROXINE SODIUM 125 UG/1
125 TABLET ORAL DAILY
Qty: 90 TABLET | Refills: 0 | Status: SHIPPED | OUTPATIENT
Start: 2018-12-13 | End: 2018-12-28 | Stop reason: SDUPTHER

## 2018-12-27 ENCOUNTER — OFFICE VISIT (OUTPATIENT)
Dept: OPTOMETRY | Facility: CLINIC | Age: 38
End: 2018-12-27
Payer: COMMERCIAL

## 2018-12-27 DIAGNOSIS — Z01.00 EXAMINATION OF EYES AND VISION: Primary | ICD-10-CM

## 2018-12-27 DIAGNOSIS — H52.13 MYOPIA, BILATERAL: ICD-10-CM

## 2018-12-27 DIAGNOSIS — E03.9 HYPOTHYROIDISM, UNSPECIFIED TYPE: ICD-10-CM

## 2018-12-27 DIAGNOSIS — E11.9 TYPE 2 DIABETES MELLITUS WITHOUT COMPLICATION, WITHOUT LONG-TERM CURRENT USE OF INSULIN: ICD-10-CM

## 2018-12-27 DIAGNOSIS — Z13.5 SCREENING FOR EYE CONDITION: ICD-10-CM

## 2018-12-27 PROCEDURE — 92014 COMPRE OPH EXAM EST PT 1/>: CPT | Mod: S$GLB,,, | Performed by: OPTOMETRIST

## 2018-12-27 PROCEDURE — 99999 PR PBB SHADOW E&M-EST. PATIENT-LVL III: CPT | Mod: PBBFAC,,, | Performed by: OPTOMETRIST

## 2018-12-27 PROCEDURE — 92015 DETERMINE REFRACTIVE STATE: CPT | Mod: S$GLB,,, | Performed by: OPTOMETRIST

## 2018-12-27 RX ORDER — LANCETS 28 GAUGE
1 EACH MISCELLANEOUS DAILY
Qty: 100 EACH | Refills: 3 | Status: ON HOLD | OUTPATIENT
Start: 2018-12-27 | End: 2019-05-22 | Stop reason: HOSPADM

## 2018-12-27 RX ORDER — METFORMIN HYDROCHLORIDE 500 MG/1
500 TABLET, EXTENDED RELEASE ORAL DAILY
Qty: 90 TABLET | Refills: 0 | OUTPATIENT
Start: 2018-12-27

## 2018-12-27 RX ORDER — LEVOTHYROXINE SODIUM 125 UG/1
125 TABLET ORAL DAILY
Qty: 90 TABLET | Refills: 0 | OUTPATIENT
Start: 2018-12-27

## 2018-12-27 NOTE — PROGRESS NOTES
HPI     Concerns About Ocular Health      Additional comments: General eye examination and refration.  Patient is currently pregnant.               Comments     Patient's age: 38 y.o. 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 Mi for follow up on retinal hole   in OS.   No treatment every done.  Stable per Dr. Mi.   City/State: [facility]   Wears glasses? Yes      If yes, wears  Full-time or part-time?  Full time   Present glasses are: Bifocal, SV Distance, SV Reading?  SV  Distance   Approximate age of present glasses:  1 yr   Got new glasses following last exam, or subsequently?:  Yes    Any problem with VA with glasses?  No  Wears CLs?:  Yes            If yes:              Type of CL worn:  Dailies Aqua Comfort Plus 8.7/14.0/-3.00 OU   only worn very occusionally.  Reports no problems with lenses.                Any problem with VA with CLs?  No   Headaches?  No  Eye pain/discomfort?  No                                                                                     Flashes?  No  Floaters?  Occasionally   Diplopia/Double vision?  no  Patient's Ocular History:         Any eye surgeries? No         Any eye injury?  No         Any treatment for eye disease?  No  Family history of eye disease?  No  Significant patient medical history:         1. Diabetes?  Yes LBS 90 this morning        If yes, IDDM or NIDDM?      2. HBP?  No                ! OTC eyedrops currently using:  No   ! Prescription eye meds currently using:  No   ! Any history of allergy/adverse reaction to any eye meds used   previously?  No    ! Any history of allergy/adverse reaction to eyedrops used during prior   eye exam(s)? No    ! Any history of allergy/adverse reaction to Novacaine or similar meds?   No   ! Any history of allergy/adverse reaction to Epinephrine or similar meds?   No    ! Patient okay with use of anesthetic eyedrops to check eye  pressure?    yes        ! Patient okay with use of eyedrops to dilate pupils today?  yes   !  Allergies/Medications/Medical History/Family History reviewed today?    Yes       PD =   60/56                                                                      Last edited by Pop Guerrier, OD on 12/27/2018  3:31 PM. (History)            Assessment /Plan     For exam results, see Encounter Report.    1. Examination of eyes and vision     2. Myopia, bilateral     3. Screening for eye condition                  History of retinal hole in the left eye.  Followed by Dr. Mi.   Was advised stable on 06/06/2018.  Myopia (nearsightedness) in each eye.  Stable refractive error in each eye.  Good best-corrected VA in each eye.  New (duplicate) spectacle lens Rx issued.    Currently wears daily disposable SCLs.  Only wears CLs occasionally.   No evidence of adverse effects secondary to contact lens wear.  Happy with lens fit and comfort.  Issued new (duplicate) CL Rx for Dailies AquaComfort Plus contact lenses.  Single use.  Discard after each use.    Recheck in one year - or prior if any problems noted in the interim.

## 2018-12-27 NOTE — PATIENT INSTRUCTIONS
History of retinal hole in the left eye.  Followed by Dr. Mi.   Was advised stable on 06/06/2018.  Myopia (nearsightedness) in each eye.  Stable refractive error in each eye.  Good best-corrected VA in each eye.  New (duplicate) spectacle lens Rx issued.    Currently wears daily disposable SCLs.  Only wears CLs occasionally.   No evidence of adverse effects secondary to contact lens wear.  Happy with lens fit and comfort.  Issued new (duplicate) CL Rx for [employer] AquaComfort Plus contact lenses.  Single use.  Discard after each use.    Recheck in one year - or prior if any problems noted in the interim.

## 2018-12-28 ENCOUNTER — PATIENT MESSAGE (OUTPATIENT)
Dept: OBSTETRICS AND GYNECOLOGY | Facility: CLINIC | Age: 38
End: 2018-12-28

## 2018-12-28 DIAGNOSIS — E03.9 HYPOTHYROIDISM, UNSPECIFIED TYPE: ICD-10-CM

## 2018-12-28 DIAGNOSIS — E11.9 TYPE 2 DIABETES MELLITUS WITHOUT COMPLICATION, WITHOUT LONG-TERM CURRENT USE OF INSULIN: ICD-10-CM

## 2018-12-28 RX ORDER — METFORMIN HYDROCHLORIDE 500 MG/1
500 TABLET, EXTENDED RELEASE ORAL DAILY
Qty: 90 TABLET | Refills: 1 | Status: SHIPPED | OUTPATIENT
Start: 2018-12-28 | End: 2019-06-27 | Stop reason: SDUPTHER

## 2018-12-28 RX ORDER — LEVOTHYROXINE SODIUM 125 UG/1
125 TABLET ORAL DAILY
Qty: 90 TABLET | Refills: 3 | Status: SHIPPED | OUTPATIENT
Start: 2018-12-28 | End: 2019-11-14 | Stop reason: SDUPTHER

## 2019-01-03 ENCOUNTER — INITIAL CONSULT (OUTPATIENT)
Dept: MATERNAL FETAL MEDICINE | Facility: CLINIC | Age: 39
End: 2019-01-03
Attending: OBSTETRICS & GYNECOLOGY
Payer: COMMERCIAL

## 2019-01-03 ENCOUNTER — PROCEDURE VISIT (OUTPATIENT)
Dept: MATERNAL FETAL MEDICINE | Facility: CLINIC | Age: 39
End: 2019-01-03
Payer: COMMERCIAL

## 2019-01-03 ENCOUNTER — ROUTINE PRENATAL (OUTPATIENT)
Dept: OBSTETRICS AND GYNECOLOGY | Facility: CLINIC | Age: 39
End: 2019-01-03
Payer: COMMERCIAL

## 2019-01-03 VITALS
WEIGHT: 203.94 LBS | SYSTOLIC BLOOD PRESSURE: 116 MMHG | DIASTOLIC BLOOD PRESSURE: 76 MMHG | BODY MASS INDEX: 31.94 KG/M2

## 2019-01-03 VITALS
SYSTOLIC BLOOD PRESSURE: 112 MMHG | DIASTOLIC BLOOD PRESSURE: 70 MMHG | BODY MASS INDEX: 31.9 KG/M2 | WEIGHT: 203.25 LBS | HEIGHT: 67 IN

## 2019-01-03 DIAGNOSIS — Z36.9 ANTENATAL SCREENING ENCOUNTER: ICD-10-CM

## 2019-01-03 DIAGNOSIS — E03.9 HYPOTHYROIDISM AFFECTING PREGNANCY IN SECOND TRIMESTER: ICD-10-CM

## 2019-01-03 DIAGNOSIS — O99.282 HYPOTHYROIDISM AFFECTING PREGNANCY IN SECOND TRIMESTER: ICD-10-CM

## 2019-01-03 DIAGNOSIS — O09.522 AMA (ADVANCED MATERNAL AGE) MULTIGRAVIDA 35+, SECOND TRIMESTER: Primary | ICD-10-CM

## 2019-01-03 DIAGNOSIS — O24.112 PRE-EXISTING TYPE 2 DIABETES MELLITUS DURING PREGNANCY IN SECOND TRIMESTER: ICD-10-CM

## 2019-01-03 DIAGNOSIS — O24.112 PRE-EXISTING TYPE 2 DIABETES MELLITUS DURING PREGNANCY IN SECOND TRIMESTER: Primary | ICD-10-CM

## 2019-01-03 PROCEDURE — 99215 OFFICE O/P EST HI 40 MIN: CPT | Mod: 25,S$GLB,, | Performed by: PEDIATRICS

## 2019-01-03 PROCEDURE — 76811 PR US, OB FETAL EVAL & EXAM, TRANSABDOM,FIRST GESTATION: ICD-10-PCS | Mod: S$GLB,,, | Performed by: PEDIATRICS

## 2019-01-03 PROCEDURE — 99999 PR PBB SHADOW E&M-EST. PATIENT-LVL III: ICD-10-PCS | Mod: PBBFAC,,, | Performed by: PEDIATRICS

## 2019-01-03 PROCEDURE — 3044F HG A1C LEVEL LT 7.0%: CPT | Mod: CPTII,S$GLB,, | Performed by: PEDIATRICS

## 2019-01-03 PROCEDURE — 3044F PR MOST RECENT HEMOGLOBIN A1C LEVEL <7.0%: ICD-10-PCS | Mod: CPTII,S$GLB,, | Performed by: PEDIATRICS

## 2019-01-03 PROCEDURE — 3008F PR BODY MASS INDEX (BMI) DOCUMENTED: ICD-10-PCS | Mod: CPTII,S$GLB,, | Performed by: PEDIATRICS

## 2019-01-03 PROCEDURE — 3008F BODY MASS INDEX DOCD: CPT | Mod: CPTII,S$GLB,, | Performed by: PEDIATRICS

## 2019-01-03 PROCEDURE — 99999 PR PBB SHADOW E&M-EST. PATIENT-LVL III: CPT | Mod: PBBFAC,,, | Performed by: PEDIATRICS

## 2019-01-03 PROCEDURE — 76811 OB US DETAILED SNGL FETUS: CPT | Mod: S$GLB,,, | Performed by: PEDIATRICS

## 2019-01-03 PROCEDURE — 0502F PR SUBSEQUENT PRENATAL CARE: ICD-10-PCS | Mod: S$GLB,,, | Performed by: OBSTETRICS & GYNECOLOGY

## 2019-01-03 PROCEDURE — 99999 PR PBB SHADOW E&M-EST. PATIENT-LVL II: ICD-10-PCS | Mod: PBBFAC,,, | Performed by: OBSTETRICS & GYNECOLOGY

## 2019-01-03 PROCEDURE — 99215 PR OFFICE/OUTPT VISIT, EST, LEVL V, 40-54 MIN: ICD-10-PCS | Mod: 25,S$GLB,, | Performed by: PEDIATRICS

## 2019-01-03 PROCEDURE — 99999 PR PBB SHADOW E&M-EST. PATIENT-LVL II: CPT | Mod: PBBFAC,,, | Performed by: OBSTETRICS & GYNECOLOGY

## 2019-01-03 PROCEDURE — 0502F SUBSEQUENT PRENATAL CARE: CPT | Mod: S$GLB,,, | Performed by: OBSTETRICS & GYNECOLOGY

## 2019-01-03 NOTE — PROGRESS NOTES
Pt doing well. No vaginal bleeding, no loss of fluid, positive fetal movement, no contractions. Bleeding/labor/rom/fmc precautions.     BS very well controlled. Fastings all less than 95. 2 hr PP BS all less than 120. 1 outlier of 125 after eating a hamburger. Will continue with same dose of Metformin. Anatomy u/s today. Will repeat thyroid labs, HgA1C at next visit. RTC 4 weeks.

## 2019-01-03 NOTE — LETTER
January 8, 2019      Yaritza Oden MD  [address] [address]           [facility]  [address]  [facility] [address]  Phone: [phone]          Patient: Josefina Mark   MR Number: [id number]   YOB: 1980   Date of Visit: 1/3/2019       Dear Dr. Yaritza Oden:    Thank you for referring Josefina Mark to me for evaluation. Attached you will find relevant portions of my assessment and plan of care.    If you have questions, please do not hesitate to call me. I look forward to following Josefina Mark along with you.    Sincerely,    Zunilda Meyers MD    Enclosure  CC:  No Recipients    If you would like to receive this communication electronically, please contact [email or web address] or [phone] to request more information on [employer] Link access.    For providers and/or their staff who would like to refer a patient to [facility], please contact us through our one-stop-shop provider referral line, [facility], at [phone].    If you feel you have received this communication in error or would no longer like to receive these types of communications, please e-mail [email or web address]

## 2019-01-08 NOTE — PROGRESS NOTES
Indication  ========    Fetal anatomy survey.    History  ======    Previous Outcomes  Preg. no. 1  Outcome: Live birth  Gest. age 40 w + 0 d  Gender: male  Details:  - vag delivery  Preg. no. 2  Outcome: Live birth  Gest. age 40 w + 0 d  Gender: male  Details:  - vag delivery  Preg. no. 3  Outcome: Live birth  Gest. age 40 w + 0 d  Gender: female  Details:  - vag delivery   4  Para 3  Todd children born living (T) 3  Todd children born (T) 3  Todd living children (L) 3  Risk Factors  History risk factors: AMA  Details: Diabetes Mellitus (type II)    Pregnancy History  ==============    Maternal Lab Tests  Test: Cell free fetal DNA analysis  Result:  negative ([id number])    Wants to know gender: yes    Maternal Assessment  =================    Weight 92 kg  Weight (lb) 203 lb    Method  ======    Transabdominal ultrasound examination, Voluson E10. View: Good view.    Pregnancy  =========    Todd pregnancy. Number of fetuses: 1.    Dating  ======    Cycle: regular cycle  Ultrasound examination on: 1/3/2019  GA by U/S based upon: AC, BPD, Femur, HC  GA by U/S 19 w + 4 d  CHANG by U/S: 2019  Assigned: Dating performed on 10/31/2018, based on ultrasound (CRL)  Assigned GA 19 w + 2 d  Assigned CHANG: 2019    General Evaluation  ==============    Cardiac activity: present.  bpm.  Fetal movements: visualized.  Presentation: variable lie.  Placenta:  Placental site: posterior. Distance from internal cervical os 20 mm.  Umbilical cord: Cord vessels: 3 vessel cord was subopt. Cord insertion: placental insertion: normal.  Amniotic fluid: normal amount.    Fetal Biometry  ============    Fetal Biometry  BPD 44.6 mm 19w 3d Hadlock  OFD 57.4 mm 20w 1d Tony  .0 mm 19w 2d Hadlock  .3 mm 19w 6d Hadlock  Femur 31.2 mm 19w 5d Hadlock  Cerebellum tr 20.7 mm 20w 3d Leyva  CM 2.9 mm  Nuchal fold 3.96 mm  Humerus 28.3 mm 19w 1d Tony   g  Calculated by: Heri  (BPD-HC-AC-FL)  EFW (lb) 0 lb  EFW (oz) 11 oz  Cephalic index 0.78  HC / AC 1.13  FL / BPD 0.70  FL / AC 0.21   bpm  Head / Face / Neck   4.7 mm  Nasal bone 7.5 mm    Fetal Anatomy  ============    Cranium: normal  Lateral ventricles: normal  Choroid plexus: normal  Midline falx: normal  Cavum septi pellucidi: normal  Cerebellum: normal  Cisterna magna: normal  Head shape: normal  Rt lateral ventricle: normal  Lt lateral ventricle: normal  Rt choroid plexus: normal  Lt choroid plexus: normal  Parenchyma: normal  Third ventricle: normal  Posterior fossa: normal  Cerebellar lobes: normal  Vermis: normal  Neck: appears normal  Nuchal fold: normal  Lips: normal  Profile: normal  Nose: normal  Maxilla: normal  Mandible: normal  4-chamber view: normal  RVOT: normal  LVOT: normal  Heart / Thorax: A/V septum was subopt  Situs: normal  Aortic arch: normal  Ductal arch: normal  SVC: normal  IVC: normal  3-vessel view: normal  3-vessel-trachea view: normal  Cardiac position: normal  Cardiac axis: normal  Cardiac size: normal  Cardiac rhythm: normal  Rt lung: normal  Lt lung: normal  Diaphragm: normal  Cord insertion: normal  Stomach: normal  Kidneys: normal  Bladder: normal  Genitals: normal  Abdom. wall: appears normal  Abdom. cavity: normal  Rt kidney: normal  Lt kidney: normal  Liver: normal  Bowel: normal  Rt renal artery: visualized  Lt renal artery: visualized  Cervical spine: suboptimal  Thoracic spine: suboptimal  Lumbar spine: suboptimal  Sacral spine: suboptimal  Arms: both visualized  Legs: both visualized  Rt arm: normal  Lt arm: normal  Rt hand: present  Lt hand: present  Rt leg: normal  Lt leg: normal  Rt foot: normal  Lt foot: normal  Position of hands: normal  Position of feet: normal  Gender: female  Wants to know gender: yes    Maternal Structures  ===============    Uterus / Cervix  Approach: Transabdominal  Cervical length 41.0 mm  Ovaries / Tubes / Adnexa  Rt ovary: Visualized  Lt  ovary: Visualized          Consultation  ==========    I saw this very nice patient in our office for a targeted anatomy and a diagnosis of type 2 diabetes. She is a 38-year-old  4 para 3003  who will be 39 at delivery. She has a history of type 2 diabetes diagnosed a year and a half prior to this (followed by her PCP) and  hypothyroidism. She takes levothyroxine 125 mcg daily, vitamin-D 1000 mg daily, metformin 500 mg Q a.m. She has had no surgeries and  denies smoking or AODA. No family history known for birth defects or genetic abnormalities.    Patient had a Materni - T21 which was negative. Her OB history is significant for 3 spontaneous vaginal deliveries in , , and   respectively. She denies any complications with those pregnancies.    Type 2 diabetes is being followed by Dr. Oden. Her hypothyroidism has been followed by her PCP. I noted that she may desire to have her  levels checked q. 1-2 months as needs change as pregnancy progresses. She does state that she has been on 125 mcg for 17 years. Last  HgbA1c was 10/31/2018 and was 5.2%. This should be repeated.    I am attaching the recommendations from MFM regarding pre-gestational diabetes.    Ideally, women with pre-gestational diabetes will have received pre-conceptional counseling to assess their baseline medical status and  educate them about the management and potential complications of diabetes in pregnancy.    1. In addition to routine prenatal testing, assessment of the diabetic  should include: glycated hemoglobin concentration, baseline renal  function, thyrotropin and free thyroxine, electrocardiogram, dilated and comprehensive eye examination by an ophthalmologist, and first  trimester ultrasound examination if pregnancy dating is uncertain.    Testing Through Pregnancy:    TEST                          FREQUENCY  Hemoglobin A1C Every 4 to 6 weeks  Blood glucose FBS, One hour or Two hour postprandials, prn  Urine  ketones When any blood glucose value is >200 mg/dL (11.1 mmol/L)  Urine protein             Dipstick at visits, 24 hour excretion each trimester if nephropathy present  Serum creatinine Each trimester in women with nephropathy  TFTs                         Baseline TSH measurement  Eye examination Baseline and then as necessary per retinal specialist    2. Information on diet, insulin therapy, exercise and glucose monitoring should be provided early with frequent follow up by CDE and MFM.    3. A HgbA1c >6% in the first trimester is associated with increased risks of miscarriage and congenital malformations. MSaFP, genetic  screening and anatomic survey are important. The risk of NTDs is increased.    4. An anatomic survey at 18 - 20 weeks and fetal echo at 22 - 24 weeks of gestation are recommended. We recommend growth q. 4 - 6  weeks.    5. Begin antepartum surveillance with twice weekly NSTs at 32 weeks or weekly BPPs with intervening NST until delivery.    6. Nifedipine or indomethacin is recommended for tocolysis of  labor.    7. If steroid course (betamethasone) is given, BS should be monitored hourly from 4 - 6 hours after initial injection to 24 - 36 hours following  second injection.    8. If the expected fetal weight is >4500 grams, C/S is recommended.    9. We suggest induction of labor at 39 - 40 weeks' except in face of macrosomia as above when spontaneous labor has not ensued. Poor  glucose control, worsening nephropathy or retinopathy, preeclampsia, or IUGR warrant consideration of earlier delivery.    10. Suspected fetal macrosomia is NOT an indication for IOL. This intervention has not been proven to improve maternal or fetal outcomes and  may increase C/S rate.    11.  ASA 81 mg po daily should be initiated at 12 - 14 weeks.      I spent 40 minutes in counseling and coordination of care with >50% spent in face to face discussion.            Impression  =========    A detailed fetal anatomic  ultrasound examination was performed for the following high risk indication: AMA    No fetal structural malformations are identified; A/V septum, 3 vessel cord and spine were sub op. Fetal imaging is incomplete today. A  follow-up study will be scheduled to complete the fetal anatomic survey.    Fetal size today is consistent with established gestational age.  Cervical length is normal.  Placental location is normal without evidence of previa.      Recommendation  ==============    See above.    Fetal echo  Completion of anatomy in 4 weeks.  Growth q. month as above.  ASA 81 mg po daily.  TFTs    Thank you again for allowing us to participate in the care of your patients. If you have any questions concerning today's consultation, feel free  to contact me or one of my partners. We can be reached at [phone] during normal business hours. If you have a question after normal  business hours, please contact Labor and Delivery at [phone].

## 2019-01-09 ENCOUNTER — TELEPHONE (OUTPATIENT)
Dept: PEDIATRIC CARDIOLOGY | Facility: CLINIC | Age: 39
End: 2019-01-09

## 2019-01-09 DIAGNOSIS — O24.112 TYPE 2 DIABETES MELLITUS AFFECTING PREGNANCY IN SECOND TRIMESTER, ANTEPARTUM: Primary | ICD-10-CM

## 2019-01-09 NOTE — TELEPHONE ENCOUNTER
Spoke with patient and scheduled for 1/31/19 @ 1596 at [facility]. Office number and location verified.    ----- Message from Todd Dobbins RN sent at 1/9/2019 10:40 AM CST -----  Please schedule fetal echo in next 3-4 wks for Type 2 DM

## 2019-01-31 ENCOUNTER — PATIENT MESSAGE (OUTPATIENT)
Dept: OBSTETRICS AND GYNECOLOGY | Facility: CLINIC | Age: 39
End: 2019-01-31

## 2019-01-31 ENCOUNTER — ROUTINE PRENATAL (OUTPATIENT)
Dept: OBSTETRICS AND GYNECOLOGY | Facility: CLINIC | Age: 39
End: 2019-01-31
Payer: COMMERCIAL

## 2019-01-31 ENCOUNTER — PROCEDURE VISIT (OUTPATIENT)
Dept: MATERNAL FETAL MEDICINE | Facility: CLINIC | Age: 39
End: 2019-01-31
Payer: COMMERCIAL

## 2019-01-31 ENCOUNTER — LAB VISIT (OUTPATIENT)
Dept: LAB | Facility: OTHER | Age: 39
End: 2019-01-31
Attending: OBSTETRICS & GYNECOLOGY
Payer: COMMERCIAL

## 2019-01-31 ENCOUNTER — OFFICE VISIT (OUTPATIENT)
Dept: PEDIATRIC CARDIOLOGY | Facility: CLINIC | Age: 39
End: 2019-01-31
Attending: PEDIATRICS
Payer: COMMERCIAL

## 2019-01-31 ENCOUNTER — CLINICAL SUPPORT (OUTPATIENT)
Dept: PEDIATRIC CARDIOLOGY | Facility: CLINIC | Age: 39
End: 2019-01-31
Attending: OBSTETRICS & GYNECOLOGY
Payer: COMMERCIAL

## 2019-01-31 VITALS
DIASTOLIC BLOOD PRESSURE: 74 MMHG | SYSTOLIC BLOOD PRESSURE: 126 MMHG | BODY MASS INDEX: 32.39 KG/M2 | WEIGHT: 206.81 LBS

## 2019-01-31 DIAGNOSIS — O09.522 AMA (ADVANCED MATERNAL AGE) MULTIGRAVIDA 35+, SECOND TRIMESTER: ICD-10-CM

## 2019-01-31 DIAGNOSIS — E03.9 HYPOTHYROIDISM AFFECTING PREGNANCY IN SECOND TRIMESTER: ICD-10-CM

## 2019-01-31 DIAGNOSIS — O24.112 PRE-EXISTING TYPE 2 DIABETES MELLITUS DURING PREGNANCY IN SECOND TRIMESTER: ICD-10-CM

## 2019-01-31 DIAGNOSIS — Z36.89 ENCOUNTER FOR ULTRASOUND TO CHECK FETAL GROWTH: Primary | ICD-10-CM

## 2019-01-31 DIAGNOSIS — O24.112 TYPE 2 DIABETES MELLITUS AFFECTING PREGNANCY IN SECOND TRIMESTER, ANTEPARTUM: ICD-10-CM

## 2019-01-31 DIAGNOSIS — O99.282 HYPOTHYROIDISM AFFECTING PREGNANCY IN SECOND TRIMESTER: ICD-10-CM

## 2019-01-31 DIAGNOSIS — Z23 NEED FOR TDAP VACCINATION: ICD-10-CM

## 2019-01-31 DIAGNOSIS — Z78.9 BREASTFEEDING (INFANT): Primary | ICD-10-CM

## 2019-01-31 DIAGNOSIS — O24.112 PRE-EXISTING TYPE 2 DIABETES MELLITUS DURING PREGNANCY IN SECOND TRIMESTER: Primary | ICD-10-CM

## 2019-01-31 DIAGNOSIS — E11.9 DIABETES MELLITUS TYPE 2 WITHOUT RETINOPATHY: ICD-10-CM

## 2019-01-31 LAB
ESTIMATED AVG GLUCOSE: 108 MG/DL
HBA1C MFR BLD HPLC: 5.4 %
T4 FREE SERPL-MCNC: 1.06 NG/DL
TSH SERPL DL<=0.005 MIU/L-ACNC: 1.65 UIU/ML

## 2019-01-31 PROCEDURE — 76825 PR  SO2 FETAL HEART: ICD-10-PCS | Mod: S$GLB,,, | Performed by: PEDIATRICS

## 2019-01-31 PROCEDURE — 99999 PR PBB SHADOW E&M-EST. PATIENT-LVL III: CPT | Mod: PBBFAC,,, | Performed by: PEDIATRICS

## 2019-01-31 PROCEDURE — 93325 PR DOPPLER COLOR FLOW VELOCITY MAP: ICD-10-PCS | Mod: S$GLB,,, | Performed by: PEDIATRICS

## 2019-01-31 PROCEDURE — 99999 PR PBB SHADOW E&M-EST. PATIENT-LVL III: ICD-10-PCS | Mod: PBBFAC,,, | Performed by: PEDIATRICS

## 2019-01-31 PROCEDURE — 76816 PR  US,PREGNANT UTERUS,F/U,TRANSABD APP: ICD-10-PCS | Mod: S$GLB,,, | Performed by: OBSTETRICS & GYNECOLOGY

## 2019-01-31 PROCEDURE — 99499 NO LOS: ICD-10-PCS | Mod: S$GLB,,, | Performed by: OBSTETRICS & GYNECOLOGY

## 2019-01-31 PROCEDURE — 99242 PR OFFICE CONSULTATION,LEVEL II: ICD-10-PCS | Mod: 25,S$GLB,, | Performed by: PEDIATRICS

## 2019-01-31 PROCEDURE — 99999 PR PBB SHADOW E&M-EST. PATIENT-LVL III: ICD-10-PCS | Mod: PBBFAC,,, | Performed by: OBSTETRICS & GYNECOLOGY

## 2019-01-31 PROCEDURE — 84443 ASSAY THYROID STIM HORMONE: CPT

## 2019-01-31 PROCEDURE — 99999 PR PBB SHADOW E&M-EST. PATIENT-LVL I: ICD-10-PCS | Mod: PBBFAC,,,

## 2019-01-31 PROCEDURE — 0502F SUBSEQUENT PRENATAL CARE: CPT | Mod: S$GLB,,, | Performed by: OBSTETRICS & GYNECOLOGY

## 2019-01-31 PROCEDURE — 76827 ECHO EXAM OF FETAL HEART: CPT | Mod: S$GLB,,, | Performed by: PEDIATRICS

## 2019-01-31 PROCEDURE — 76825 ECHO EXAM OF FETAL HEART: CPT | Mod: S$GLB,,, | Performed by: PEDIATRICS

## 2019-01-31 PROCEDURE — 99999 PR PBB SHADOW E&M-EST. PATIENT-LVL I: CPT | Mod: PBBFAC,,,

## 2019-01-31 PROCEDURE — 99242 OFF/OP CONSLTJ NEW/EST SF 20: CPT | Mod: 25,S$GLB,, | Performed by: PEDIATRICS

## 2019-01-31 PROCEDURE — 83036 HEMOGLOBIN GLYCOSYLATED A1C: CPT

## 2019-01-31 PROCEDURE — 76827 PR  SO2 FETAL HEART DOPPLER: ICD-10-PCS | Mod: S$GLB,,, | Performed by: PEDIATRICS

## 2019-01-31 PROCEDURE — 36415 COLL VENOUS BLD VENIPUNCTURE: CPT

## 2019-01-31 PROCEDURE — 84439 ASSAY OF FREE THYROXINE: CPT

## 2019-01-31 PROCEDURE — 93325 DOPPLER ECHO COLOR FLOW MAPG: CPT | Mod: S$GLB,,, | Performed by: PEDIATRICS

## 2019-01-31 PROCEDURE — 0502F PR SUBSEQUENT PRENATAL CARE: ICD-10-PCS | Mod: S$GLB,,, | Performed by: OBSTETRICS & GYNECOLOGY

## 2019-01-31 PROCEDURE — 99999 PR PBB SHADOW E&M-EST. PATIENT-LVL III: CPT | Mod: PBBFAC,,, | Performed by: OBSTETRICS & GYNECOLOGY

## 2019-01-31 PROCEDURE — 76816 OB US FOLLOW-UP PER FETUS: CPT | Mod: S$GLB,,, | Performed by: OBSTETRICS & GYNECOLOGY

## 2019-01-31 PROCEDURE — 99499 UNLISTED E&M SERVICE: CPT | Mod: S$GLB,,, | Performed by: OBSTETRICS & GYNECOLOGY

## 2019-01-31 NOTE — PROGRESS NOTES
Pt doing well. No vaginal bleeding, no loss of fluid, positive fetal movement, no contractions. Bleeding/labor/rom/fmc precautions.     BS very well controlled on Metformin 500 mg daily. See Media tab for sugars. Highest sugar is 119. Fetal echo today. Thyroid labs, HgA1c drawn today. Tdap next visit. Rx breast pump given. RTC 4 weeks.

## 2019-02-01 NOTE — PROGRESS NOTES
Ms. Mark  is a 38 y.o. year old  , referred by  because of the history of diabetes mellitus.    The patient presented at approximately 23 2/7 weeks gestation (Patient's last menstrual period was 2018 (approximate).).  The patient denied any complaints.    Past medical history: Diabetes Mellitus (type II) and hypothyroidism.  Past surgical history: Negative.  Past gestational history: The patient has three healthy children (2 boys, one girl).    Family history: Negative for congenital heart disease, and sudden death during childhood.    Medications:   Outpatient Encounter Medications as of 2019   Medication Sig Dispense Refill    cholecalciferol, vitamin D3, (VITAMIN D3) 1,000 unit capsule Take 1 capsule (1,000 Units total) by mouth once daily. 30 capsule 1    levothyroxine (SYNTHROID) 125 MCG tablet Take 1 tablet (125 mcg total) by mouth once daily. 90 tablet 3    metFORMIN (GLUCOPHAGE-XR) 500 MG 24 hr tablet Take 1 tablet (500 mg total) by mouth once daily. 90 tablet 1    prenatal 25/iron fum/folic/dha (PRENATAL-1 ORAL) Take by mouth.      blood sugar diagnostic Strp use once daily. 50 strip 11    blood-glucose meter kit Use as instructed 1 each 0    lancets 28 gauge Misc use once daily to test blood glucose 100 each 3    [DISCONTINUED] ergocalciferol (ERGOCALCIFEROL) 50,000 unit Cap TAKE 1 CAPSULE BY MOUTH EVERY 7 DAYS 12 capsule 1     No facility-administered encounter medications on file as of 2019.        Allergies: Patient has no known allergies.    Last menstrual period 2018.    Fetal echocardiogram revealed a four chamber fetal heart with situs solitus.  The ventricles appeared to be equal in size.  A small echogenic focus was seen in the left ventricular cavity.  The contractility of both ventricles was good.  The fetal heart rate was within the normal range, and regular.  The interventricular septum appeared to be intact.  There were normally related  great arteries seen.  The ductal and aortic arch were well visualized, and appeared to be widely patent.  There was no pleural or pericardial effusion seen.    Doppler analysis revealed a three vessel umbilical cord, with normal flow patterns, and velocities by Doppler.  There was a normal flow pattern seen in the ductus venosus.  There was evidence of normal systemic, and pulmonary venous return seen.  There was a normal right to left shunt seen across the foramen ovale.  There were normal inflow patterns seen across the AV-valves, without significant insufficiency.  There was no ventricular level shunt seen.  The right and left ventricular outflow tract, and ductal and aortic arch appeared to be unobstructed.    Impression:  It is our impression that Ms. Mark had a normal fetal echocardiogram.  A small echogenic focus was seen in the left ventricular cavity.   As you know, small defects, and coarctation of the aorta cannot always be ruled out on fetal echocardiogram.  We discussed our findings with the patient, reviewed our images, and answered her questions. We mentioned that an isolated echogenic focus, in the absence of other findings suggestive of Down syndrome, can be considered to be a normal variant.  We also discussed the limitations of fetal echocardiography, but emphasized that her child appears to have normal cardiac anatomy.  No further follow up is scheduled in our clinic, but, of course, we will always be available to reevaluate this patient, if needed.    The above information was discussed in detail including the use of diagrams, with 30 minutes of total face to face time, with greater than 50% with counseling and coordination of care.  The discussion of the diagnosis and treatment options is as described above.      Time spent: 30 minutes, 50% dedicated to counseling.

## 2019-02-20 ENCOUNTER — PATIENT MESSAGE (OUTPATIENT)
Dept: OBSTETRICS AND GYNECOLOGY | Facility: CLINIC | Age: 39
End: 2019-02-20

## 2019-02-28 ENCOUNTER — PROCEDURE VISIT (OUTPATIENT)
Dept: MATERNAL FETAL MEDICINE | Facility: CLINIC | Age: 39
End: 2019-02-28
Payer: COMMERCIAL

## 2019-02-28 ENCOUNTER — ROUTINE PRENATAL (OUTPATIENT)
Dept: OBSTETRICS AND GYNECOLOGY | Facility: CLINIC | Age: 39
End: 2019-02-28
Payer: COMMERCIAL

## 2019-02-28 ENCOUNTER — CLINICAL SUPPORT (OUTPATIENT)
Dept: OBSTETRICS AND GYNECOLOGY | Facility: CLINIC | Age: 39
End: 2019-02-28
Payer: COMMERCIAL

## 2019-02-28 VITALS
DIASTOLIC BLOOD PRESSURE: 82 MMHG | WEIGHT: 210.75 LBS | BODY MASS INDEX: 33.01 KG/M2 | SYSTOLIC BLOOD PRESSURE: 110 MMHG

## 2019-02-28 DIAGNOSIS — Z23 NEED FOR TDAP VACCINATION: ICD-10-CM

## 2019-02-28 DIAGNOSIS — Z34.03 ENCOUNTER FOR SUPERVISION OF NORMAL FIRST PREGNANCY IN THIRD TRIMESTER: ICD-10-CM

## 2019-02-28 DIAGNOSIS — O99.283 HYPOTHYROID IN PREGNANCY, ANTEPARTUM, THIRD TRIMESTER: Primary | ICD-10-CM

## 2019-02-28 DIAGNOSIS — Z36.89 ENCOUNTER FOR ULTRASOUND TO CHECK FETAL GROWTH: ICD-10-CM

## 2019-02-28 DIAGNOSIS — E03.9 HYPOTHYROID IN PREGNANCY, ANTEPARTUM, THIRD TRIMESTER: Primary | ICD-10-CM

## 2019-02-28 DIAGNOSIS — Z36.89 ENCOUNTER FOR ULTRASOUND TO ASSESS FETAL GROWTH: Primary | ICD-10-CM

## 2019-02-28 PROCEDURE — 99999 PR PBB SHADOW E&M-EST. PATIENT-LVL II: CPT | Mod: PBBFAC,,,

## 2019-02-28 PROCEDURE — 99999 PR PBB SHADOW E&M-EST. PATIENT-LVL II: ICD-10-PCS | Mod: PBBFAC,,,

## 2019-02-28 PROCEDURE — 90715 TDAP VACCINE GREATER THAN OR EQUAL TO 7YO IM: ICD-10-PCS | Mod: S$GLB,,, | Performed by: OBSTETRICS & GYNECOLOGY

## 2019-02-28 PROCEDURE — 76816 PR  US,PREGNANT UTERUS,F/U,TRANSABD APP: ICD-10-PCS | Mod: S$GLB,,, | Performed by: OBSTETRICS & GYNECOLOGY

## 2019-02-28 PROCEDURE — 99999 PR PBB SHADOW E&M-EST. PATIENT-LVL III: CPT | Mod: PBBFAC,,, | Performed by: OBSTETRICS & GYNECOLOGY

## 2019-02-28 PROCEDURE — 99499 NO LOS: ICD-10-PCS | Mod: S$GLB,,, | Performed by: OBSTETRICS & GYNECOLOGY

## 2019-02-28 PROCEDURE — 99999 PR PBB SHADOW E&M-EST. PATIENT-LVL III: ICD-10-PCS | Mod: PBBFAC,,, | Performed by: OBSTETRICS & GYNECOLOGY

## 2019-02-28 PROCEDURE — 0502F SUBSEQUENT PRENATAL CARE: CPT | Mod: CPTII,S$GLB,, | Performed by: OBSTETRICS & GYNECOLOGY

## 2019-02-28 PROCEDURE — 76816 OB US FOLLOW-UP PER FETUS: CPT | Mod: S$GLB,,, | Performed by: OBSTETRICS & GYNECOLOGY

## 2019-02-28 PROCEDURE — 90715 TDAP VACCINE 7 YRS/> IM: CPT | Mod: S$GLB,,, | Performed by: OBSTETRICS & GYNECOLOGY

## 2019-02-28 PROCEDURE — 0502F PR SUBSEQUENT PRENATAL CARE: ICD-10-PCS | Mod: CPTII,S$GLB,, | Performed by: OBSTETRICS & GYNECOLOGY

## 2019-02-28 PROCEDURE — 99499 UNLISTED E&M SERVICE: CPT | Mod: S$GLB,,, | Performed by: OBSTETRICS & GYNECOLOGY

## 2019-02-28 PROCEDURE — 90471 IMMUNIZATION ADMIN: CPT | Mod: S$GLB,,, | Performed by: OBSTETRICS & GYNECOLOGY

## 2019-02-28 PROCEDURE — 90471 TDAP VACCINE GREATER THAN OR EQUAL TO 7YO IM: ICD-10-PCS | Mod: S$GLB,,, | Performed by: OBSTETRICS & GYNECOLOGY

## 2019-03-01 ENCOUNTER — LAB VISIT (OUTPATIENT)
Dept: LAB | Facility: OTHER | Age: 39
End: 2019-03-01
Attending: OBSTETRICS & GYNECOLOGY
Payer: COMMERCIAL

## 2019-03-01 DIAGNOSIS — E03.9 HYPOTHYROID IN PREGNANCY, ANTEPARTUM, THIRD TRIMESTER: ICD-10-CM

## 2019-03-01 DIAGNOSIS — Z34.03 ENCOUNTER FOR SUPERVISION OF NORMAL FIRST PREGNANCY IN THIRD TRIMESTER: ICD-10-CM

## 2019-03-01 DIAGNOSIS — O99.283 HYPOTHYROID IN PREGNANCY, ANTEPARTUM, THIRD TRIMESTER: ICD-10-CM

## 2019-03-01 LAB
25(OH)D3+25(OH)D2 SERPL-MCNC: 27 NG/ML
BASOPHILS # BLD AUTO: 0.02 K/UL
BASOPHILS NFR BLD: 0.3 %
DIFFERENTIAL METHOD: ABNORMAL
EOSINOPHIL # BLD AUTO: 0.1 K/UL
EOSINOPHIL NFR BLD: 1.8 %
ERYTHROCYTE [DISTWIDTH] IN BLOOD BY AUTOMATED COUNT: 13.5 %
HCT VFR BLD AUTO: 33.6 %
HGB BLD-MCNC: 11.1 G/DL
LYMPHOCYTES # BLD AUTO: 1.7 K/UL
LYMPHOCYTES NFR BLD: 23 %
MCH RBC QN AUTO: 30.1 PG
MCHC RBC AUTO-ENTMCNC: 33 G/DL
MCV RBC AUTO: 91 FL
MONOCYTES # BLD AUTO: 0.4 K/UL
MONOCYTES NFR BLD: 5.9 %
NEUTROPHILS # BLD AUTO: 5.1 K/UL
NEUTROPHILS NFR BLD: 68.5 %
PLATELET # BLD AUTO: 215 K/UL
PMV BLD AUTO: 10.3 FL
RBC # BLD AUTO: 3.69 M/UL
T4 FREE SERPL-MCNC: 1.11 NG/DL
TSH SERPL DL<=0.005 MIU/L-ACNC: 1.05 UIU/ML
WBC # BLD AUTO: 7.4 K/UL

## 2019-03-01 PROCEDURE — 85025 COMPLETE CBC W/AUTO DIFF WBC: CPT

## 2019-03-01 PROCEDURE — 84443 ASSAY THYROID STIM HORMONE: CPT

## 2019-03-01 PROCEDURE — 82306 VITAMIN D 25 HYDROXY: CPT

## 2019-03-01 PROCEDURE — 36415 COLL VENOUS BLD VENIPUNCTURE: CPT

## 2019-03-01 PROCEDURE — 84439 ASSAY OF FREE THYROXINE: CPT

## 2019-03-28 ENCOUNTER — ROUTINE PRENATAL (OUTPATIENT)
Dept: OBSTETRICS AND GYNECOLOGY | Facility: CLINIC | Age: 39
End: 2019-03-28
Payer: COMMERCIAL

## 2019-03-28 ENCOUNTER — PROCEDURE VISIT (OUTPATIENT)
Dept: MATERNAL FETAL MEDICINE | Facility: CLINIC | Age: 39
End: 2019-03-28
Payer: COMMERCIAL

## 2019-03-28 VITALS
DIASTOLIC BLOOD PRESSURE: 54 MMHG | SYSTOLIC BLOOD PRESSURE: 101 MMHG | WEIGHT: 216.69 LBS | BODY MASS INDEX: 33.94 KG/M2

## 2019-03-28 DIAGNOSIS — O09.523 AMA (ADVANCED MATERNAL AGE) MULTIGRAVIDA 35+, THIRD TRIMESTER: ICD-10-CM

## 2019-03-28 DIAGNOSIS — O24.013 PRE-EXISTING TYPE 1 DIABETES MELLITUS DURING PREGNANCY IN THIRD TRIMESTER: ICD-10-CM

## 2019-03-28 DIAGNOSIS — Z36.89 ENCOUNTER FOR ULTRASOUND TO ASSESS FETAL GROWTH: ICD-10-CM

## 2019-03-28 DIAGNOSIS — O23.42 URINARY TRACT INFECTION IN MOTHER DURING SECOND TRIMESTER OF PREGNANCY: Primary | ICD-10-CM

## 2019-03-28 PROCEDURE — 76816 OB US FOLLOW-UP PER FETUS: CPT | Mod: S$GLB,,, | Performed by: PEDIATRICS

## 2019-03-28 PROCEDURE — 99999 PR PBB SHADOW E&M-EST. PATIENT-LVL III: ICD-10-PCS | Mod: PBBFAC,,, | Performed by: OBSTETRICS & GYNECOLOGY

## 2019-03-28 PROCEDURE — 0502F SUBSEQUENT PRENATAL CARE: CPT | Mod: CPTII,S$GLB,, | Performed by: OBSTETRICS & GYNECOLOGY

## 2019-03-28 PROCEDURE — 76816 PR  US,PREGNANT UTERUS,F/U,TRANSABD APP: ICD-10-PCS | Mod: S$GLB,,, | Performed by: PEDIATRICS

## 2019-03-28 PROCEDURE — 0502F PR SUBSEQUENT PRENATAL CARE: ICD-10-PCS | Mod: CPTII,S$GLB,, | Performed by: OBSTETRICS & GYNECOLOGY

## 2019-03-28 PROCEDURE — 99499 UNLISTED E&M SERVICE: CPT | Mod: S$GLB,,, | Performed by: PEDIATRICS

## 2019-03-28 PROCEDURE — 99499 NO LOS: ICD-10-PCS | Mod: S$GLB,,, | Performed by: PEDIATRICS

## 2019-03-28 PROCEDURE — 87086 URINE CULTURE/COLONY COUNT: CPT

## 2019-03-28 PROCEDURE — 99999 PR PBB SHADOW E&M-EST. PATIENT-LVL III: CPT | Mod: PBBFAC,,, | Performed by: OBSTETRICS & GYNECOLOGY

## 2019-03-28 NOTE — PROGRESS NOTES
Pt doing well. No vaginal bleeding, no loss of fluid, positive fetal movement, no contractions. Bleeding/labor/rom/fmc precautions.     Sugars are wonderful!! All blood sugars are WNL. Will start BPPs next week. RTC 3-4 weeks.

## 2019-03-29 LAB
BACTERIA UR CULT: NORMAL
BACTERIA UR CULT: NORMAL

## 2019-03-31 NOTE — PROGRESS NOTES
Indication  ========    Follow-up evaluation for fetal growth, . Follow-up evaluation of anatomy.    History  ======    Previous Outcomes  Preg. no. 1  Outcome: Live birth  Gest. age 40 w + 0 d  Gender: male  Details:  - vag delivery  Preg. no. 2  Outcome: Live birth  Gest. age 40 w + 0 d  Gender: male  Details:  - vag delivery  Preg. no. 3  Outcome: Live birth  Gest. age 40 w + 0 d  Gender: female  Details:  - vag delivery   4  Para 3  Todd children born living (T) 3  Todd children born (T) 3  Todd living children (L) 3  Risk Factors  History risk factors: AMA  Details: Diabetes Mellitus (type II)    Pregnancy History  ==============    Maternal Lab Tests  Test: Cell free fetal DNA analysis  Result:  negative ([id number])    Wants to know gender: yes    Method  ======    Transabdominal ultrasound examination, 2D Color Doppler, Voluson E10. View: Good view.    Pregnancy  =========    Todd pregnancy. Number of fetuses: 1.    Dating  ======    Cycle: regular cycle  Ultrasound examination on: 3/28/2019  GA by U/S based upon: AC, BPD, Femur, HC  GA by U/S 33 w + 5 d  CHANG by U/S: 2019  Assigned: Dating performed on 10/31/2018, based on ultrasound (CRL)  Assigned GA 31 w + 2 d  Assigned CHANG: 2019    General Evaluation  ==============    Cardiac activity: present.  bpm.  Fetal movements: visualized.  Presentation: cephalic.  Placenta: posterior.  Umbilical cord: 3 vessel cord.  Amniotic fluid: Amount of AF: polyhydramnios. MVP 8.4 cm. EDGAR 26.6 cm. Q1 8.4 cm, Q2 5.7 cm, Q3 6.6 cm, Q4 6.0 cm.    Fetal Biometry  ============    Fetal Biometry  BPD 86.2 mm 34w 5d Hadlock  .0 mm 36w 4d Tony  .4 mm 35w 0d Hadlock  .7 mm 33w 5d Hadlock  Femur 60.9 mm 31w 4d Hadlock  EFW 2,166 g 58% Sergei  Calculated by: Heri (BPD-HC-AC-FL)  EFW (lb) 4 lb  EFW (oz) 12 oz  Cephalic index 0.78  HC / AC 1.05  FL / BPD 0.71  FL / AC 0.21  MVP 8.4 cm  EDGAR 26.6  cm   bpm    Fetal Anatomy  ============    Cranium: normal  Stomach: normal  Kidneys: normal  Bladder: normal  Genitals: documented previously  Spine / Skelet.: TRV spine normal  Gender: female  Wants to know gender: yes          Impression  =========    Fetal size is AGA with the EFW at the 58th percentile.  Normal repeat limited fetal anatomic survey.    AFV is normal.            Recommendation  ==============    Repeat ultrasound study as clinically indicated.      Thank you again for allowing us to participate in the care of your patients. If you have any questions concerning today's consultation, feel free  to contact me or one of my partners. We can be reached at [phone] during normal business hours. If you have a question after normal  business hours, please contact Labor and Delivery at [phone].

## 2019-04-04 ENCOUNTER — HOSPITAL ENCOUNTER (OUTPATIENT)
Dept: PERINATAL CARE | Facility: OTHER | Age: 39
Discharge: HOME | End: 2019-04-04
Attending: OBSTETRICS & GYNECOLOGY
Payer: COMMERCIAL

## 2019-04-04 DIAGNOSIS — O09.523 AMA (ADVANCED MATERNAL AGE) MULTIGRAVIDA 35+, THIRD TRIMESTER: ICD-10-CM

## 2019-04-04 PROCEDURE — 76819 PR US, OB, FETAL BIOPHYSICAL, W/O NST: ICD-10-PCS | Mod: 26,,, | Performed by: OBSTETRICS & GYNECOLOGY

## 2019-04-04 PROCEDURE — 76819 FETAL BIOPHYS PROFIL W/O NST: CPT | Mod: 26,,, | Performed by: OBSTETRICS & GYNECOLOGY

## 2019-04-04 PROCEDURE — 76819 FETAL BIOPHYS PROFIL W/O NST: CPT

## 2019-04-05 ENCOUNTER — PATIENT MESSAGE (OUTPATIENT)
Dept: OBSTETRICS AND GYNECOLOGY | Facility: CLINIC | Age: 39
End: 2019-04-05

## 2019-04-05 RX ORDER — FLUCONAZOLE 150 MG/1
150 TABLET ORAL DAILY
Qty: 1 TABLET | Refills: 0 | Status: SHIPPED | OUTPATIENT
Start: 2019-04-05 | End: 2019-04-06

## 2019-04-11 ENCOUNTER — HOSPITAL ENCOUNTER (OUTPATIENT)
Dept: PERINATAL CARE | Facility: OTHER | Age: 39
Discharge: HOME | End: 2019-04-11
Attending: OBSTETRICS & GYNECOLOGY
Payer: COMMERCIAL

## 2019-04-11 DIAGNOSIS — O09.523 AMA (ADVANCED MATERNAL AGE) MULTIGRAVIDA 35+, THIRD TRIMESTER: ICD-10-CM

## 2019-04-11 PROCEDURE — 76819 FETAL BIOPHYS PROFIL W/O NST: CPT | Mod: 26,,, | Performed by: PEDIATRICS

## 2019-04-11 PROCEDURE — 76819 PR US, OB, FETAL BIOPHYSICAL, W/O NST: ICD-10-PCS | Mod: 26,,, | Performed by: PEDIATRICS

## 2019-04-11 PROCEDURE — 76819 FETAL BIOPHYS PROFIL W/O NST: CPT

## 2019-04-11 NOTE — PROGRESS NOTES
Indication  ========    BPP for AMA.    History  ======    Previous Outcomes  Preg. no. 1  Outcome: Live birth  Gest. age 40 w + 0 d  Gender: male  Details:  - vag delivery  Preg. no. 2  Outcome: Live birth  Gest. age 40 w + 0 d  Gender: male  Details:  - vag delivery  Preg. no. 3  Outcome: Live birth  Gest. age 40 w + 0 d  Gender: female  Details:  - vag delivery   4  Para 3  Todd children born living (T) 3  Todd children born (T) 3  Todd living children (L) 3  Risk Factors  History risk factors: AMA  Details: Diabetes Mellitus (type II)    Method  ======    Transabdominal ultrasound examination, Voluson S8. View: Good view.    Pregnancy  =========    Todd pregnancy. Number of fetuses: 1.    Dating  ======    Cycle: regular cycle  Assigned: Dating performed on 10/31/2018, based on ultrasound (CRL)  Assigned GA 33 w + 2 d  Assigned CHANG: 2019    General Evaluation  ==============    Cardiac activity: present.  bpm.  Fetal movements: visualized.  Presentation: cephalic.  Placenta:  Placental site: posterior.  Umbilical cord: Cord vessels: 3 vessel cord.    Amniotic Fluid Assessment  =====================    Amount of AF: normal amount  MVP 5.9 cm    Biophysical Profile  ==============    2: Fetal breathing movements  2: Gross body movements  2: Fetal tone  2: Amniotic fluid volume  8/8: Biophysical profile score  Interpretation: normal          Impression  =========    The BPP score is reassuring at 8/8, and the MVP is normal.            Recommendation  ==============    Continue fetal surveillance as previously outlined

## 2019-04-18 ENCOUNTER — HOSPITAL ENCOUNTER (OUTPATIENT)
Dept: PERINATAL CARE | Facility: OTHER | Age: 39
Discharge: HOME | End: 2019-04-18
Attending: OBSTETRICS & GYNECOLOGY
Payer: COMMERCIAL

## 2019-04-18 DIAGNOSIS — O09.523 AMA (ADVANCED MATERNAL AGE) MULTIGRAVIDA 35+, THIRD TRIMESTER: ICD-10-CM

## 2019-04-18 PROCEDURE — 76819 PR US, OB, FETAL BIOPHYSICAL, W/O NST: ICD-10-PCS | Mod: 26,,, | Performed by: OBSTETRICS & GYNECOLOGY

## 2019-04-18 PROCEDURE — 76819 FETAL BIOPHYS PROFIL W/O NST: CPT

## 2019-04-18 PROCEDURE — 76819 FETAL BIOPHYS PROFIL W/O NST: CPT | Mod: 26,,, | Performed by: OBSTETRICS & GYNECOLOGY

## 2019-04-25 ENCOUNTER — ROUTINE PRENATAL (OUTPATIENT)
Dept: OBSTETRICS AND GYNECOLOGY | Facility: CLINIC | Age: 39
End: 2019-04-25
Payer: COMMERCIAL

## 2019-04-25 ENCOUNTER — HOSPITAL ENCOUNTER (OUTPATIENT)
Dept: PERINATAL CARE | Facility: OTHER | Age: 39
Discharge: HOME | End: 2019-04-25
Attending: OBSTETRICS & GYNECOLOGY
Payer: COMMERCIAL

## 2019-04-25 VITALS — WEIGHT: 215.19 LBS | BODY MASS INDEX: 33.7 KG/M2 | SYSTOLIC BLOOD PRESSURE: 100 MMHG | DIASTOLIC BLOOD PRESSURE: 70 MMHG

## 2019-04-25 DIAGNOSIS — O09.523 AMA (ADVANCED MATERNAL AGE) MULTIGRAVIDA 35+, THIRD TRIMESTER: ICD-10-CM

## 2019-04-25 DIAGNOSIS — O99.283 HYPOTHYROIDISM AFFECTING PREGNANCY, ANTEPARTUM, THIRD TRIMESTER: ICD-10-CM

## 2019-04-25 DIAGNOSIS — O24.913 DIABETES MELLITUS AFFECTING PREGNANCY IN THIRD TRIMESTER: ICD-10-CM

## 2019-04-25 DIAGNOSIS — E03.9 HYPOTHYROIDISM AFFECTING PREGNANCY, ANTEPARTUM, THIRD TRIMESTER: ICD-10-CM

## 2019-04-25 DIAGNOSIS — Z34.80 SUPERVISION OF OTHER NORMAL PREGNANCY, ANTEPARTUM: Primary | ICD-10-CM

## 2019-04-25 PROCEDURE — 76819 FETAL BIOPHYS PROFIL W/O NST: CPT | Mod: 26,,, | Performed by: OBSTETRICS & GYNECOLOGY

## 2019-04-25 PROCEDURE — 0502F PR SUBSEQUENT PRENATAL CARE: ICD-10-PCS | Mod: CPTII,S$GLB,, | Performed by: OBSTETRICS & GYNECOLOGY

## 2019-04-25 PROCEDURE — 87081 CULTURE SCREEN ONLY: CPT

## 2019-04-25 PROCEDURE — 99999 PR PBB SHADOW E&M-EST. PATIENT-LVL III: ICD-10-PCS | Mod: PBBFAC,,, | Performed by: OBSTETRICS & GYNECOLOGY

## 2019-04-25 PROCEDURE — 99999 PR PBB SHADOW E&M-EST. PATIENT-LVL III: CPT | Mod: PBBFAC,,, | Performed by: OBSTETRICS & GYNECOLOGY

## 2019-04-25 PROCEDURE — 0502F SUBSEQUENT PRENATAL CARE: CPT | Mod: CPTII,S$GLB,, | Performed by: OBSTETRICS & GYNECOLOGY

## 2019-04-25 PROCEDURE — 76819 FETAL BIOPHYS PROFIL W/O NST: CPT

## 2019-04-25 PROCEDURE — 76819 PR US, OB, FETAL BIOPHYSICAL, W/O NST: ICD-10-PCS | Mod: 26,,, | Performed by: OBSTETRICS & GYNECOLOGY

## 2019-04-25 NOTE — PROGRESS NOTES
Pt doing well. No vaginal bleeding, no loss of fluid, positive fetal movement, no contractions. Bleeding/labor/rom/fmc precautions.     GBS done. PNT this AM. RTC 2 weeks. Induction 5/21/19 @ 4:00 am if undelivered.

## 2019-04-29 LAB — BACTERIA SPEC AEROBE CULT: NORMAL

## 2019-04-30 ENCOUNTER — PROCEDURE VISIT (OUTPATIENT)
Dept: MATERNAL FETAL MEDICINE | Facility: CLINIC | Age: 39
End: 2019-04-30
Payer: COMMERCIAL

## 2019-04-30 DIAGNOSIS — Z36.89 ENCOUNTER FOR ULTRASOUND TO ASSESS FETAL GROWTH: ICD-10-CM

## 2019-04-30 DIAGNOSIS — O24.313 PRE-EXISTING DIABETES MELLITUS DURING PREGNANCY IN THIRD TRIMESTER: ICD-10-CM

## 2019-04-30 PROCEDURE — 76816 OB US FOLLOW-UP PER FETUS: CPT | Mod: S$GLB,,, | Performed by: OBSTETRICS & GYNECOLOGY

## 2019-04-30 PROCEDURE — 76819 PR US, OB, FETAL BIOPHYSICAL, W/O NST: ICD-10-PCS | Mod: S$GLB,,, | Performed by: OBSTETRICS & GYNECOLOGY

## 2019-04-30 PROCEDURE — 76819 FETAL BIOPHYS PROFIL W/O NST: CPT | Mod: S$GLB,,, | Performed by: OBSTETRICS & GYNECOLOGY

## 2019-04-30 PROCEDURE — 76816 PR  US,PREGNANT UTERUS,F/U,TRANSABD APP: ICD-10-PCS | Mod: S$GLB,,, | Performed by: OBSTETRICS & GYNECOLOGY

## 2019-05-09 ENCOUNTER — HOSPITAL ENCOUNTER (OUTPATIENT)
Dept: PERINATAL CARE | Facility: OTHER | Age: 39
Discharge: HOME | End: 2019-05-09
Attending: OBSTETRICS & GYNECOLOGY
Payer: COMMERCIAL

## 2019-05-09 ENCOUNTER — ROUTINE PRENATAL (OUTPATIENT)
Dept: OBSTETRICS AND GYNECOLOGY | Facility: CLINIC | Age: 39
End: 2019-05-09
Payer: COMMERCIAL

## 2019-05-09 VITALS
DIASTOLIC BLOOD PRESSURE: 68 MMHG | SYSTOLIC BLOOD PRESSURE: 102 MMHG | BODY MASS INDEX: 33.49 KG/M2 | WEIGHT: 213.88 LBS

## 2019-05-09 DIAGNOSIS — O09.523 AMA (ADVANCED MATERNAL AGE) MULTIGRAVIDA 35+, THIRD TRIMESTER: ICD-10-CM

## 2019-05-09 DIAGNOSIS — Z34.80 SUPERVISION OF OTHER NORMAL PREGNANCY, ANTEPARTUM: Primary | ICD-10-CM

## 2019-05-09 PROCEDURE — 99999 PR PBB SHADOW E&M-EST. PATIENT-LVL III: CPT | Mod: PBBFAC,,, | Performed by: OBSTETRICS & GYNECOLOGY

## 2019-05-09 PROCEDURE — 76819 PR US, OB, FETAL BIOPHYSICAL, W/O NST: ICD-10-PCS | Mod: 26,,, | Performed by: OBSTETRICS & GYNECOLOGY

## 2019-05-09 PROCEDURE — 76819 FETAL BIOPHYS PROFIL W/O NST: CPT

## 2019-05-09 PROCEDURE — 99999 PR PBB SHADOW E&M-EST. PATIENT-LVL III: ICD-10-PCS | Mod: PBBFAC,,, | Performed by: OBSTETRICS & GYNECOLOGY

## 2019-05-09 PROCEDURE — 76819 FETAL BIOPHYS PROFIL W/O NST: CPT | Mod: 26,,, | Performed by: OBSTETRICS & GYNECOLOGY

## 2019-05-09 PROCEDURE — 0502F PR SUBSEQUENT PRENATAL CARE: ICD-10-PCS | Mod: CPTII,S$GLB,, | Performed by: OBSTETRICS & GYNECOLOGY

## 2019-05-09 PROCEDURE — 0502F SUBSEQUENT PRENATAL CARE: CPT | Mod: CPTII,S$GLB,, | Performed by: OBSTETRICS & GYNECOLOGY

## 2019-05-09 RX ORDER — AZITHROMYCIN 250 MG/1
500 TABLET, FILM COATED ORAL DAILY
Qty: 6 TABLET | Refills: 1 | Status: SHIPPED | OUTPATIENT
Start: 2019-05-09 | End: 2019-05-14

## 2019-05-16 ENCOUNTER — HOSPITAL ENCOUNTER (OUTPATIENT)
Dept: PERINATAL CARE | Facility: OTHER | Age: 39
Discharge: HOME | End: 2019-05-16
Attending: OBSTETRICS & GYNECOLOGY
Payer: COMMERCIAL

## 2019-05-16 ENCOUNTER — ROUTINE PRENATAL (OUTPATIENT)
Dept: OBSTETRICS AND GYNECOLOGY | Facility: CLINIC | Age: 39
End: 2019-05-16
Payer: COMMERCIAL

## 2019-05-16 VITALS
WEIGHT: 211.19 LBS | BODY MASS INDEX: 33.08 KG/M2 | SYSTOLIC BLOOD PRESSURE: 100 MMHG | DIASTOLIC BLOOD PRESSURE: 64 MMHG

## 2019-05-16 DIAGNOSIS — Z34.80 SUPERVISION OF OTHER NORMAL PREGNANCY, ANTEPARTUM: Primary | ICD-10-CM

## 2019-05-16 DIAGNOSIS — O09.523 AMA (ADVANCED MATERNAL AGE) MULTIGRAVIDA 35+, THIRD TRIMESTER: ICD-10-CM

## 2019-05-16 PROCEDURE — 0502F PR SUBSEQUENT PRENATAL CARE: ICD-10-PCS | Mod: CPTII,S$GLB,, | Performed by: OBSTETRICS & GYNECOLOGY

## 2019-05-16 PROCEDURE — 0502F SUBSEQUENT PRENATAL CARE: CPT | Mod: CPTII,S$GLB,, | Performed by: OBSTETRICS & GYNECOLOGY

## 2019-05-16 PROCEDURE — 76819 FETAL BIOPHYS PROFIL W/O NST: CPT

## 2019-05-16 PROCEDURE — 99999 PR PBB SHADOW E&M-EST. PATIENT-LVL III: CPT | Mod: PBBFAC,,, | Performed by: OBSTETRICS & GYNECOLOGY

## 2019-05-16 PROCEDURE — 76819 PR US, OB, FETAL BIOPHYSICAL, W/O NST: ICD-10-PCS | Mod: 26,,, | Performed by: PEDIATRICS

## 2019-05-16 PROCEDURE — 99999 PR PBB SHADOW E&M-EST. PATIENT-LVL III: ICD-10-PCS | Mod: PBBFAC,,, | Performed by: OBSTETRICS & GYNECOLOGY

## 2019-05-16 PROCEDURE — 76819 FETAL BIOPHYS PROFIL W/O NST: CPT | Mod: 26,,, | Performed by: PEDIATRICS

## 2019-05-16 NOTE — PROGRESS NOTES
Pt doing well. No vaginal bleeding, no loss of fluid, positive fetal movement, no contractions. Bleeding/labor/rom/fmc precautions.     Induction on Tuesday at 4:00 am. RTC 6 weeks.

## 2019-05-17 NOTE — PROGRESS NOTES
Indication  ========    BPP/ MVP: AMA    History  ======    Previous Outcomes   4  Para 3  Todd children born living (T) 3  Todd children born (T) 3  Todd living children (L) 3  Preg. no. 1  Outcome: live birth  Gest. age 40 w + 0 d  Gender: male  Details: 1998 vag delivery  Preg. no. 2  Outcome: live birth  Gest. age 40 w + 0 d  Gender: male  Details:  - vag delivery  Preg. no. 3  Outcome: live birth  Gest. age 40 w + 0 d  Gender: female  Details:  - vag delivery  Risk Factors  History risk factors: AMA  Details: Diabetes Mellitus (type II)    Method  ======    Transabdominal ultrasound examination, Voluson S8. View: Good view    Pregnancy  =========    Todd pregnancy. Number of fetuses: 1    Dating  ======    Cycle: regular cycle  Assigned: Dating performed on 10/31/2018, based on ultrasound (CRL)  Assigned GA 38 w + 2 d  Assigned CHANG: 2019    General Evaluation  ==============    Cardiac activity present.  bpm.  Fetal movements visualized.  Presentation cephalic.  Placenta posterior.  Umbilical cord 3 vessel cord.    Amniotic Fluid Assessment  =====================    Amount of AF: normal amount  MVP 3.8 cm    Biophysical Profile  ==============    2: Fetal breathing movements  2: Gross body movements  2: Fetal tone  2: Amniotic fluid volume  8/8 Biophysical profile score  Interpretation: normal    Impression  =========    The BPP score is reassuring at 8/8, and the MVP is normal.    Recommendation  ==============    Continue fetal surveillance as previously outlined

## 2019-05-21 ENCOUNTER — HOSPITAL ENCOUNTER (INPATIENT)
Facility: OTHER | Age: 39
LOS: 2 days | Discharge: HOME | End: 2019-05-23
Attending: OBSTETRICS & GYNECOLOGY | Admitting: OBSTETRICS & GYNECOLOGY
Payer: COMMERCIAL

## 2019-05-21 ENCOUNTER — ANESTHESIA (OUTPATIENT)
Dept: OBSTETRICS AND GYNECOLOGY | Facility: OTHER | Age: 39
End: 2019-05-21
Payer: COMMERCIAL

## 2019-05-21 ENCOUNTER — ANESTHESIA EVENT (OUTPATIENT)
Dept: OBSTETRICS AND GYNECOLOGY | Facility: OTHER | Age: 39
End: 2019-05-21
Payer: COMMERCIAL

## 2019-05-21 DIAGNOSIS — Z34.90 ENCOUNTER FOR ELECTIVE INDUCTION OF LABOR: ICD-10-CM

## 2019-05-21 LAB
ABO + RH BLD: NORMAL
BASOPHILS # BLD AUTO: 0.06 K/UL (ref 0–0.2)
BASOPHILS NFR BLD: 0.9 % (ref 0–1.9)
BLD GP AB SCN CELLS X3 SERPL QL: NORMAL
BLOOD GROUP ANTIBODIES SERPL: NORMAL
DIFFERENTIAL METHOD: ABNORMAL
EOSINOPHIL # BLD AUTO: 0.1 K/UL (ref 0–0.5)
EOSINOPHIL NFR BLD: 1.9 % (ref 0–8)
ERYTHROCYTE [DISTWIDTH] IN BLOOD BY AUTOMATED COUNT: 13.1 % (ref 11.5–14.5)
HCT VFR BLD AUTO: 33.9 % (ref 37–48.5)
HGB BLD-MCNC: 11.4 G/DL (ref 12–16)
LYMPHOCYTES # BLD AUTO: 1.9 K/UL (ref 1–4.8)
LYMPHOCYTES NFR BLD: 28.2 % (ref 18–48)
MCH RBC QN AUTO: 30.2 PG (ref 27–31)
MCHC RBC AUTO-ENTMCNC: 33.6 G/DL (ref 32–36)
MCV RBC AUTO: 90 FL (ref 82–98)
MONOCYTES # BLD AUTO: 0.4 K/UL (ref 0.3–1)
MONOCYTES NFR BLD: 6.5 % (ref 4–15)
NEUTROPHILS # BLD AUTO: 4.3 K/UL (ref 1.8–7.7)
NEUTROPHILS NFR BLD: 62.2 % (ref 38–73)
PLATELET # BLD AUTO: 194 K/UL (ref 150–350)
PMV BLD AUTO: 11.3 FL (ref 9.2–12.9)
POCT GLUCOSE: 100 MG/DL (ref 70–110)
POCT GLUCOSE: 60 MG/DL (ref 70–110)
POCT GLUCOSE: 71 MG/DL (ref 70–110)
RBC # BLD AUTO: 3.78 M/UL (ref 4–5.4)
WBC # BLD AUTO: 6.82 K/UL (ref 3.9–12.7)

## 2019-05-21 PROCEDURE — 59400 PR FULL ROUT OBSTE CARE,VAGINAL DELIV: ICD-10-PCS | Mod: GB,,, | Performed by: OBSTETRICS & GYNECOLOGY

## 2019-05-21 PROCEDURE — 25000003 PHARM REV CODE 250: Performed by: OBSTETRICS & GYNECOLOGY

## 2019-05-21 PROCEDURE — 86850 RBC ANTIBODY SCREEN: CPT

## 2019-05-21 PROCEDURE — 27800517 HC TRAY,EPIDURAL-CONTINUOUS: Performed by: ANESTHESIOLOGY

## 2019-05-21 PROCEDURE — 72100002 HC LABOR CARE, 1ST 8 HOURS

## 2019-05-21 PROCEDURE — 86870 RBC ANTIBODY IDENTIFICATION: CPT

## 2019-05-21 PROCEDURE — 59400 OBSTETRICAL CARE: CPT | Mod: QY,,, | Performed by: ANESTHESIOLOGY

## 2019-05-21 PROCEDURE — 25000003 PHARM REV CODE 250: Performed by: ANESTHESIOLOGY

## 2019-05-21 PROCEDURE — 72200005 HC VAGINAL DELIVERY LEVEL II

## 2019-05-21 PROCEDURE — 63600175 PHARM REV CODE 636 W HCPCS: Performed by: OBSTETRICS & GYNECOLOGY

## 2019-05-21 PROCEDURE — 59400 PRA FULL ROUT OBSTE CARE,VAGINAL DELIV: ICD-10-PCS | Mod: QY,,, | Performed by: ANESTHESIOLOGY

## 2019-05-21 PROCEDURE — 62326 NJX INTERLAMINAR LMBR/SAC: CPT | Performed by: ANESTHESIOLOGY

## 2019-05-21 PROCEDURE — 27000181 HC CABLE, IUPC

## 2019-05-21 PROCEDURE — 11000001 HC ACUTE MED/SURG PRIVATE ROOM

## 2019-05-21 PROCEDURE — 25000003 PHARM REV CODE 250: Performed by: STUDENT IN AN ORGANIZED HEALTH CARE EDUCATION/TRAINING PROGRAM

## 2019-05-21 PROCEDURE — 86905 BLOOD TYPING RBC ANTIGENS: CPT

## 2019-05-21 PROCEDURE — 85025 COMPLETE CBC W/AUTO DIFF WBC: CPT

## 2019-05-21 PROCEDURE — 59400 OBSTETRICAL CARE: CPT | Mod: GB,,, | Performed by: OBSTETRICS & GYNECOLOGY

## 2019-05-21 PROCEDURE — 27200710 HC EPIDURAL INFUSION PUMP SET: Performed by: ANESTHESIOLOGY

## 2019-05-21 PROCEDURE — 51702 INSERT TEMP BLADDER CATH: CPT

## 2019-05-21 RX ORDER — HYDROCODONE BITARTRATE AND ACETAMINOPHEN 5; 325 MG/1; MG/1
1 TABLET ORAL EVERY 4 HOURS PRN
Status: DISCONTINUED | OUTPATIENT
Start: 2019-05-21 | End: 2019-05-23 | Stop reason: HOSPADM

## 2019-05-21 RX ORDER — SODIUM CHLORIDE 0.9 % (FLUSH) 0.9 %
3 SYRINGE (ML) INJECTION EVERY 8 HOURS
Status: DISCONTINUED | OUTPATIENT
Start: 2019-05-21 | End: 2019-05-23 | Stop reason: HOSPADM

## 2019-05-21 RX ORDER — OXYTOCIN/RINGER'S LACTATE 20/1000 ML
2 PLASTIC BAG, INJECTION (ML) INTRAVENOUS CONTINUOUS
Status: DISCONTINUED | OUTPATIENT
Start: 2019-05-21 | End: 2019-05-23 | Stop reason: HOSPADM

## 2019-05-21 RX ORDER — DEXTROSE, SODIUM CHLORIDE, SODIUM LACTATE, POTASSIUM CHLORIDE, AND CALCIUM CHLORIDE 5; .6; .31; .03; .02 G/100ML; G/100ML; G/100ML; G/100ML; G/100ML
INJECTION, SOLUTION INTRAVENOUS CONTINUOUS
Status: DISCONTINUED | OUTPATIENT
Start: 2019-05-21 | End: 2019-05-21

## 2019-05-21 RX ORDER — FENTANYL/BUPIVACAINE/NS/PF 2MCG/ML-.1
PLASTIC BAG, INJECTION (ML) INJECTION
Status: DISPENSED
Start: 2019-05-21 | End: 2019-05-21

## 2019-05-21 RX ORDER — OXYTOCIN/RINGER'S LACTATE 20/1000 ML
10 PLASTIC BAG, INJECTION (ML) INTRAVENOUS ONCE
Status: DISCONTINUED | OUTPATIENT
Start: 2019-05-21 | End: 2019-05-23 | Stop reason: HOSPADM

## 2019-05-21 RX ORDER — OXYTOCIN/RINGER'S LACTATE 20/1000 ML
41.7 PLASTIC BAG, INJECTION (ML) INTRAVENOUS CONTINUOUS
Status: ACTIVE | OUTPATIENT
Start: 2019-05-21 | End: 2019-05-21

## 2019-05-21 RX ORDER — ONDANSETRON 8 MG/1
8 TABLET, ORALLY DISINTEGRATING ORAL EVERY 8 HOURS PRN
Status: DISCONTINUED | OUTPATIENT
Start: 2019-05-21 | End: 2019-05-23 | Stop reason: HOSPADM

## 2019-05-21 RX ORDER — SODIUM CHLORIDE, SODIUM LACTATE, POTASSIUM CHLORIDE, CALCIUM CHLORIDE 600; 310; 30; 20 MG/100ML; MG/100ML; MG/100ML; MG/100ML
INJECTION, SOLUTION INTRAVENOUS CONTINUOUS
Status: DISCONTINUED | OUTPATIENT
Start: 2019-05-21 | End: 2019-05-23 | Stop reason: HOSPADM

## 2019-05-21 RX ORDER — MISOPROSTOL 200 UG/1
600 TABLET ORAL
Status: DISCONTINUED | OUTPATIENT
Start: 2019-05-21 | End: 2019-05-23 | Stop reason: HOSPADM

## 2019-05-21 RX ORDER — OXYTOCIN/RINGER'S LACTATE 20/1000 ML
333 PLASTIC BAG, INJECTION (ML) INTRAVENOUS CONTINUOUS
Status: ACTIVE | OUTPATIENT
Start: 2019-05-21 | End: 2019-05-21

## 2019-05-21 RX ORDER — IBUPROFEN 600 MG/1
600 TABLET ORAL EVERY 6 HOURS
Status: DISCONTINUED | OUTPATIENT
Start: 2019-05-21 | End: 2019-05-23 | Stop reason: HOSPADM

## 2019-05-21 RX ORDER — DOCUSATE SODIUM 100 MG/1
200 CAPSULE, LIQUID FILLED ORAL 2 TIMES DAILY PRN
Status: DISCONTINUED | OUTPATIENT
Start: 2019-05-21 | End: 2019-05-23 | Stop reason: HOSPADM

## 2019-05-21 RX ORDER — HYDROCODONE BITARTRATE AND ACETAMINOPHEN 10; 325 MG/1; MG/1
1 TABLET ORAL EVERY 4 HOURS PRN
Status: DISCONTINUED | OUTPATIENT
Start: 2019-05-21 | End: 2019-05-23 | Stop reason: HOSPADM

## 2019-05-21 RX ORDER — SODIUM CHLORIDE 9 MG/ML
INJECTION, SOLUTION INTRAVENOUS
Status: DISCONTINUED | OUTPATIENT
Start: 2019-05-21 | End: 2019-05-23 | Stop reason: HOSPADM

## 2019-05-21 RX ORDER — DIPHENHYDRAMINE HYDROCHLORIDE 50 MG/ML
25 INJECTION INTRAMUSCULAR; INTRAVENOUS EVERY 4 HOURS PRN
Status: DISCONTINUED | OUTPATIENT
Start: 2019-05-21 | End: 2019-05-23 | Stop reason: HOSPADM

## 2019-05-21 RX ORDER — FENTANYL/BUPIVACAINE/NS/PF 2MCG/ML-.1
PLASTIC BAG, INJECTION (ML) INJECTION CONTINUOUS PRN
Status: DISCONTINUED | OUTPATIENT
Start: 2019-05-21 | End: 2019-05-21

## 2019-05-21 RX ORDER — GLUCAGON 1 MG
1 KIT INJECTION CONTINUOUS PRN
Status: DISCONTINUED | OUTPATIENT
Start: 2019-05-21 | End: 2019-05-21

## 2019-05-21 RX ORDER — IBUPROFEN 200 MG
24 TABLET ORAL
Status: DISCONTINUED | OUTPATIENT
Start: 2019-05-21 | End: 2019-05-21

## 2019-05-21 RX ORDER — LIDOCAINE HYDROCHLORIDE AND EPINEPHRINE 15; 5 MG/ML; UG/ML
INJECTION, SOLUTION EPIDURAL
Status: DISCONTINUED | OUTPATIENT
Start: 2019-05-21 | End: 2019-05-21

## 2019-05-21 RX ORDER — HYDROCORTISONE 25 MG/G
CREAM TOPICAL 3 TIMES DAILY PRN
Status: DISCONTINUED | OUTPATIENT
Start: 2019-05-21 | End: 2019-05-23 | Stop reason: HOSPADM

## 2019-05-21 RX ORDER — OXYTOCIN/RINGER'S LACTATE 20/1000 ML
41.65 PLASTIC BAG, INJECTION (ML) INTRAVENOUS CONTINUOUS
Status: ACTIVE | OUTPATIENT
Start: 2019-05-21 | End: 2019-05-21

## 2019-05-21 RX ORDER — SODIUM CITRATE AND CITRIC ACID MONOHYDRATE 334; 500 MG/5ML; MG/5ML
30 SOLUTION ORAL ONCE
Status: DISCONTINUED | OUTPATIENT
Start: 2019-05-21 | End: 2019-05-21

## 2019-05-21 RX ORDER — FENTANYL/BUPIVACAINE/NS/PF 2MCG/ML-.1
PLASTIC BAG, INJECTION (ML) INJECTION CONTINUOUS
Status: DISCONTINUED | OUTPATIENT
Start: 2019-05-21 | End: 2019-05-21

## 2019-05-21 RX ORDER — ONDANSETRON 8 MG/1
8 TABLET, ORALLY DISINTEGRATING ORAL EVERY 8 HOURS PRN
Status: DISCONTINUED | OUTPATIENT
Start: 2019-05-21 | End: 2019-05-21 | Stop reason: SDUPTHER

## 2019-05-21 RX ORDER — DIPHENHYDRAMINE HCL 25 MG
25 CAPSULE ORAL EVERY 4 HOURS PRN
Status: DISCONTINUED | OUTPATIENT
Start: 2019-05-21 | End: 2019-05-23 | Stop reason: HOSPADM

## 2019-05-21 RX ORDER — FAMOTIDINE 10 MG/ML
20 INJECTION INTRAVENOUS ONCE
Status: DISCONTINUED | OUTPATIENT
Start: 2019-05-21 | End: 2019-05-21

## 2019-05-21 RX ORDER — ACETAMINOPHEN 325 MG/1
650 TABLET ORAL EVERY 6 HOURS PRN
Status: DISCONTINUED | OUTPATIENT
Start: 2019-05-21 | End: 2019-05-23 | Stop reason: HOSPADM

## 2019-05-21 RX ORDER — METFORMIN HYDROCHLORIDE 500 MG/1
500 TABLET, EXTENDED RELEASE ORAL DAILY
Status: DISCONTINUED | OUTPATIENT
Start: 2019-05-22 | End: 2019-05-23 | Stop reason: HOSPADM

## 2019-05-21 RX ADMIN — SODIUM CHLORIDE, SODIUM LACTATE, POTASSIUM CHLORIDE, AND CALCIUM CHLORIDE: .6; .31; .03; .02 INJECTION, SOLUTION INTRAVENOUS at 06:05

## 2019-05-21 RX ADMIN — Medication 1 MILLI-UNITS/MIN: at 07:05

## 2019-05-21 RX ADMIN — LIDOCAINE HYDROCHLORIDE,EPINEPHRINE BITARTRATE 3 ML: 15; .005 INJECTION, SOLUTION EPIDURAL; INFILTRATION; INTRACAUDAL; PERINEURAL at 05:05

## 2019-05-21 RX ADMIN — Medication 10 ML/HR: at 05:05

## 2019-05-21 RX ADMIN — IBUPROFEN 600 MG: 600 TABLET ORAL at 10:05

## 2019-05-21 RX ADMIN — IBUPROFEN 600 MG: 600 TABLET ORAL at 05:05

## 2019-05-21 RX ADMIN — SODIUM CHLORIDE, SODIUM LACTATE, POTASSIUM CHLORIDE, AND CALCIUM CHLORIDE 1000 ML: .6; .31; .03; .02 INJECTION, SOLUTION INTRAVENOUS at 05:05

## 2019-05-21 NOTE — PROGRESS NOTES
05/21/19 0654   TeleStork Kitty Note - Strip   Strip Reviewed by Kitty Nurse? Yes   TeleStork Kitty Note - Communication   Custar Nurse Communicated with Bedside Nurse Regarding: Fetal Status   TeleStork Kitty Note - Notification   Nurse Notified? Yes   Name of Nurse aurelio resendez rn  (to report fhr tracing to dr alford)   Doctor Notified? Yes   Name of Doctor dr de la rosa reviewd fetal tracing

## 2019-05-21 NOTE — ANESTHESIA PROCEDURE NOTES
Epidural    Patient location during procedure: OB   Reason for block: primary anesthetic   Diagnosis: IUP   Start time: 5/21/2019 5:42 AM  Timeout: 5/21/2019 5:42 AM  End time: 5/21/2019 6:00 AM  Surgery related to: Vaginal Delivery  Staffing  Anesthesiologist: Adriane Russell MD  Resident/CRNA: Mikel Daniel MD  Performed: resident/CRNA   Preanesthetic Checklist  Completed: patient identified, site marked, surgical consent, pre-op evaluation, timeout performed, IV checked, risks and benefits discussed, monitors and equipment checked, anesthesia consent given, hand hygiene performed and patient being monitored  Preparation  Patient position: sitting  Prep: ChloraPrep  Patient monitoring: Pulse Ox  Epidural  Skin Anesthetic: lidocaine 1%  Skin Wheal: 3 mL  Administration type: continuous  Approach: midline  Interspace: L3-4    Injection technique: TONY saline  Needle and Epidural Catheter  Needle type: Tuohy   Needle gauge: 17  Needle length: 3.5 inches  Needle insertion depth: 6.5 cm  Catheter type: springwDattch  Catheter size: 19 G  Catheter at skin depth: 11 cm  Test dose: 3 mL of lidocaine 1.5% with Epi 1-to-200,000  Additional Documentation: incremental injection, negative aspiration for heme and CSF, no paresthesia on injection, no signs/symptoms of IV or SA injection, no significant pain on injection and no significant complaints from patient  Needle localization: anatomical landmarks  Medications:  Volume per aspiration: 5 mL  Time between aspirations: 5 minutes  Assessment  Ease of block: easy  Patient's tolerance of the procedure: comfortable throughout block and no complaints  Additional Notes  Bolused 10 cc from epidural bag Dural puncture performed with spinal needle.

## 2019-05-21 NOTE — L&D DELIVERY NOTE
[facility]  Vaginal Delivery   Operative Note    SUMMARY   Patient was checked and found to be complete/+1. After two maternal pushes,   Normal spontaneous vaginal delivery of live infant, skin to skin was unable to be performed due to meconium stained amniotic fluid which required initial evaluation by NICU staff.  Infant delivered position OA over intact perineum.  Nuchal cord: No.    Spontaneous delivery of placenta and IV pitocin given noting good uterine tone.  2nd degree laceration noted and repaired in normal fashion with 2-0 Vicryl on CT.   Patient tolerated delivery well. Sponge needle and lap counted correctly x2.    Indications:  (spontaneous vaginal delivery)  Pregnancy complicated by:   Patient Active Problem List   Diagnosis    Hypothyroidism affecting pregnancy in second trimester    Type 2 diabetes mellitus without complication, without long-term current use of insulin    Vitamin D insufficiency    Retinal hole of left eye    Diabetes mellitus type 2 without retinopathy    AMA (advanced maternal age) multigravida 35+, third trimester    Pre-existing type 2 diabetes mellitus during pregnancy in second trimester    Encounter for induction of labor    Encounter for elective induction of labor     (spontaneous vaginal delivery)     Admitting GA: 39w0d    Delivery Information for  Joanne Mark    Birth information:  YOB: 2020   Time of birth: 10:25 AM   Sex: female   Head Delivery Date/Time: 2019 10:25 AM   Delivery type: Vaginal, Spontaneous   Gestational Age: 39w0d    Delivery Providers    Delivering clinician:  Yaritza Oden MD   Provider Role    MD Kaylee Garcia MD Lynn M Lovoi, PANKAJ Farmer, PANKAJ             Measurements    Weight:  3740 g  Length:  52.1 cm  Head circumference:  34.9 cm  Chest circumference:  34.3 cm         Apgars    Living status:  Living  Apgars:   1 min.:   5 min.:    10 min.:   15 min.:   20 min.:     Skin color:   0  1  1      Heart rate:   1  2  2      Reflex irritability:   1  1  2      Muscle tone:   1  1  2      Respiratory effort:   0  1  2      Total:   3  6  9      Apgars assigned by:  NICU         Operative Delivery    Forceps attempted?:  No  Vacuum extractor attempted?:  No         Shoulder Dystocia    Shoulder dystocia present?:  No           Presentation    Presentation:  Vertex  Position:  Left Occiput Anterior           Interventions/Resuscitation    Method:  NICU Attended       Cord    Vessels:  3 vessels  Complications:  Nuchal  Nuchal Intervention:  reduced  Nuchal Cord Description:  loose nuchal cord  Number of Loops:  1  Delayed Cord Clamping?:  No  Cord Clamped Date/Time:  2019 10:25 AM  Cord Blood Disposition:  Sent with Baby  Gases Sent?:  No  Stem Cell Collection (by MD):  No       Placenta    Placenta delivery date/time:  2019 1030  Placenta removal:  Spontaneous  Placenta appearance:  Intact  Placenta disposition:  discarded           Labor Events:       labor: No     Labor Onset Date/Time: 2019 05:00     Dilation Complete Date/Time: 2019 09:50     Start Pushing Date/Time: 2019 10:24     Rupture Date/Time:              Rupture type:           Fluid Amount:        Fluid Color:        Fluid Odor:        Membrane Status (PeriCalm): ARM (Artificial Rupture)      Rupture Date/Time (PeriCalm): 2019 06:40:00      Fluid Amount (PeriCalm): Moderate      Fluid Color (PeriCalm): Meconium Moderate       steroids: None     Antibiotics given for GBS: No     Induction: oxytocin     Indications for induction:        Augmentation:       Indications for augmentation:       Labor complications: None     Additional complications:          Cervical ripening:                     Delivery:      Episiotomy: None     Indication for Episiotomy:       Perineal Lacerations: 1st Repaired:  Yes   Periurethral Laceration:   Repaired:      Labial Laceration:   Repaired:     Sulcus Laceration:   Repaired:     Vaginal Laceration:   Repaired:     Cervical Laceration:   Repaired:     Repair suture:       Repair # of packets: 2     Last Value - EBL - Nursing (mL): 250     Sum - EBL - Nursing (mL): 250     Last Value - EBL - Anesthesia (mL):      Calculated QBL (mL):        Vaginal Sweep Performed: Yes     Surgicount Correct: Yes       Other providers:       Anesthesia    Method:  Epidural          Details (if applicable):  Trial of Labor      Categorization:      Priority:     Indications for :     Incision Type:       Additional  information:  Forceps:    Vacuum:    Breech:    Observed anomalies    Other (Comments):

## 2019-05-21 NOTE — PROGRESS NOTES
"LABOR NOTE    S:  Complaints: No.  Epidural working:  yes  MD to bedside for routine cervical exam.     O: /69   Pulse 72   Temp 98.1 °F (36.7 °C)   Resp 18   Ht 5' 7" (1.702 m)   Wt 95.8 kg (211 lb 3.2 oz)   LMP 2018 (Approximate)   SpO2 97%   Breastfeeding? No   BMI 33.08 kg/m²       FHT: 150, minimal accels, mild variables, minimal BTBV  Cat 2 (reassuring)  CTX: irregular  SVE: /-2 , FSE/IUPC placed    ASSESSMENT:   39 y.o.  at 39w0d, who presents for elective IOL    FHT reassuring    Active Hospital Problems    Diagnosis  POA    Encounter for induction of labor [Z34.90]  Not Applicable    Encounter for elective induction of labor [Z34.90]  Not Applicable      Resolved Hospital Problems   No resolved problems to display.     Labor note:  0430: 460/-3  0630: 80/-2, AROM meconium    PLAN:    Continue Close Maternal/Fetal Monitoring  Continue pitocin Augmentation per protocol  Recheck 2 hours or PRN    Wilmer Hsu MD  PGY-3 OB/GYN        "

## 2019-05-21 NOTE — PROGRESS NOTES
"LABOR NOTE    S:  Complaints: No.  Epidural working:  yes  MD to bedside for routine cervical exam.     O: /67   Pulse 71   Temp 97.2 °F (36.2 °C) (Temporal)   Resp 16   Ht 5' 7" (1.702 m)   Wt 95.8 kg (211 lb 3.2 oz)   LMP 2018 (Approximate)   SpO2 98%   Breastfeeding? No   BMI 33.08 kg/m²       FHT: 150, minimal accels, mild variables, minimal BTBV  Cat 2 (reassuring)  CTX: irregular  SVE: /-2  AROM, meconium    ASSESSMENT:   39 y.o.  at 39w0d, who presents for elective IOL    FHT reassuring    Active Hospital Problems    Diagnosis  POA    Encounter for induction of labor [Z34.90]  Not Applicable    Encounter for elective induction of labor [Z34.90]  Not Applicable      Resolved Hospital Problems   No resolved problems to display.     Labor note:  0430: 460/-3  0630: 80/-2, AROM meconium    PLAN:    Continue Close Maternal/Fetal Monitoring  Start pitocin Augmentation per protocol  Recheck 2 hours or PRN    Kaylee Conn MD  OB/GYN  PGY-1      "

## 2019-05-21 NOTE — ANESTHESIA PREPROCEDURE EVALUATION
2019  Pre-operative evaluation for * No surgery found *    Josefina Mark is a 39 y.o. female  at 39w0d who presents for IOL. Patient has had 3 prior vaginal deliveries. Uncomplicated pregnancy. Patient with PMH of DMII and Hypothyroidism. She's not on any anticoagulation.      Prev airway: None on file    Drips: LR Infusion    Patient Active Problem List   Diagnosis    Hypothyroidism affecting pregnancy in second trimester    Type 2 diabetes mellitus without complication, without long-term current use of insulin    Vitamin D insufficiency    Retinal hole of left eye    Diabetes mellitus type 2 without retinopathy    AMA (advanced maternal age) multigravida 35+, third trimester    Pre-existing type 2 diabetes mellitus during pregnancy in second trimester    Encounter for induction of labor    Encounter for elective induction of labor       Review of patient's allergies indicates:  No Known Allergies     No current facility-administered medications on file prior to encounter.      Current Outpatient Medications on File Prior to Encounter   Medication Sig Dispense Refill    blood sugar diagnostic Strp use once daily. 50 strip 11    blood-glucose meter kit Use as instructed 1 each 0    cholecalciferol, vitamin D3, (VITAMIN D3) 1,000 unit capsule Take 1 capsule (1,000 Units total) by mouth once daily. 30 capsule 1    lancets 28 gauge Misc use once daily to test blood glucose 100 each 3    levothyroxine (SYNTHROID) 125 MCG tablet Take 1 tablet (125 mcg total) by mouth once daily. 90 tablet 3    metFORMIN (GLUCOPHAGE-XR) 500 MG 24 hr tablet Take 1 tablet (500 mg total) by mouth once daily. 90 tablet 1    prenatal 25/iron fum/folic/dha (PRENATAL-1 ORAL) Take by mouth.         No past surgical history on file.    Social History     Socioeconomic History    Marital status:       Spouse name: Not on file    Number of children: Not on file    Years of education: Not on file    Highest education level: Not on file   Occupational History     Employer: [employer]   Social Needs    Financial resource strain: Not on file    Food insecurity:     Worry: Not on file     Inability: Not on file    Transportation needs:     Medical: Not on file     Non-medical: Not on file   Tobacco Use    Smoking status: Never Smoker    Smokeless tobacco: Never Used   Substance and Sexual Activity    Alcohol use: No    Drug use: No    Sexual activity: Yes     Birth control/protection: None   Lifestyle    Physical activity:     Days per week: Not on file     Minutes per session: Not on file    Stress: Not on file   Relationships    Social connections:     Talks on phone: Not on file     Gets together: Not on file     Attends Yazidi service: Not on file     Active member of club or organization: Not on file     Attends meetings of clubs or organizations: Not on file     Relationship status: Not on file   Other Topics Concern    Are you pregnant or think you may be? Not Asked    Breast-feeding Not Asked   Social History Narrative    Not on file         Vital Signs Range (Last 24H):         CBC: No results for input(s): WBC, RBC, HGB, HCT, PLT, MCV, MCH, MCHC in the last 72 hours.    CMP: No results for input(s): NA, K, CL, CO2, BUN, CREATININE, GLU, MG, PHOS, CALCIUM, ALBUMIN, PROT, ALKPHOS, ALT, AST, BILITOT in the last 72 hours.    INR  No results for input(s): PT, INR, PROTIME, APTT in the last 72 hours.    EKG: None      2D Echo: None          Anesthesia Evaluation    I have reviewed the Patient Summary Reports.    I have reviewed the Nursing Notes.      Review of Systems  Anesthesia Hx:  No problems with previous Anesthesia  History of prior surgery of interest to airway management or planning: Denies Family Hx of Anesthesia complications.   Denies Personal Hx of Anesthesia  complications.   Hematology/Oncology:  Hematology Normal   Oncology Normal     EENT/Dental:EENT/Dental Normal   Cardiovascular:  Cardiovascular Normal     Pulmonary:  Pulmonary Normal    Renal/:  Renal/ Normal     Hepatic/GI:  Hepatic/GI Normal    Endocrine:   Diabetes, well controlled Hypothyroidism        Physical Exam  General:  Obesity    Airway/Jaw/Neck:  Airway Findings: Mouth Opening: Normal Tongue: Normal  General Airway Assessment: Adult  Mallampati: III  Improves to II with phonation.        Eyes/Ears/Nose:  EYES/EARS/NOSE FINDINGS: Normal   Dental:  DENTAL FINDINGS: Normal   Chest/Lungs:  Chest/Lungs Clear    Heart/Vascular:  Heart Findings: Normal Heart murmur: negative       Mental Status:  Mental Status Findings: Normal        Anesthesia Plan  Type of Anesthesia, risks & benefits discussed:  Anesthesia Type:  general, epidural, CSE, spinal  Patient's Preference:   Intra-op Monitoring Plan: standard ASA monitors  Intra-op Monitoring Plan Comments:   Post Op Pain Control Plan: multimodal analgesia  Post Op Pain Control Plan Comments:   Induction:   IV  Beta Blocker:  Patient is not currently on a Beta-Blocker (No further documentation required).       Informed Consent: Patient understands risks and agrees with Anesthesia plan.  Questions answered. Anesthesia consent signed with patient.  ASA Score: 2     Day of Surgery Review of History & Physical: I have interviewed and examined the patient. I have reviewed the patient's H&P dated:  There are no significant changes.          Ready For Surgery From Anesthesia Perspective.

## 2019-05-21 NOTE — H&P
HISTORY AND PHYSICAL                                                OBSTETRICS          Subjective:       Josefina Mark is a 39 y.o.  female with IUP at 39w0d weeks gestation who presents for elective  induction of labor.    Patient denies contractions,  vaginal bleeding, and LOF.   Fetal Movement: normal.    This IUP is complicated by hypothyroid, type II DM (on metformin).    Review of Systems   Constitutional: Negative for activity change and appetite change.   HENT: Negative for tinnitus.    Respiratory: Negative for cough and shortness of breath.    Gastrointestinal: Negative for abdominal pain, constipation and diarrhea.   Genitourinary: Negative for vaginal discharge, vaginal pain, vaginal dryness and vaginal odor.   Neurological: Negative for headaches.   Psychiatric/Behavioral: Negative for depression.       PMHx:   Past Medical History:   Diagnosis Date    Diabetes mellitus, type 2     Fever blister     Hypothyroidism        PSHx: No past surgical history on file.    All: Review of patient's allergies indicates:  No Known Allergies    Meds:   Medications Prior to Admission   Medication Sig Dispense Refill Last Dose    blood sugar diagnostic Strp use once daily. 50 strip 11 Taking    blood-glucose meter kit Use as instructed 1 each 0 Taking    cholecalciferol, vitamin D3, (VITAMIN D3) 1,000 unit capsule Take 1 capsule (1,000 Units total) by mouth once daily. 30 capsule 1 Taking    lancets 28 gauge Misc use once daily to test blood glucose 100 each 3 Taking    levothyroxine (SYNTHROID) 125 MCG tablet Take 1 tablet (125 mcg total) by mouth once daily. 90 tablet 3 Taking    metFORMIN (GLUCOPHAGE-XR) 500 MG 24 hr tablet Take 1 tablet (500 mg total) by mouth once daily. 90 tablet 1 Taking    prenatal 25/iron fum/folic/dha (PRENATAL-1 ORAL) Take by mouth.   Taking       SH:   Social History     Socioeconomic History    Marital status:      Spouse name: Not on file    Number of  children: Not on file    Years of education: Not on file    Highest education level: Not on file   Occupational History     Employer: [employer]   Social Needs    Financial resource strain: Not on file    Food insecurity:     Worry: Not on file     Inability: Not on file    Transportation needs:     Medical: Not on file     Non-medical: Not on file   Tobacco Use    Smoking status: Never Smoker    Smokeless tobacco: Never Used   Substance and Sexual Activity    Alcohol use: No    Drug use: No    Sexual activity: Yes     Birth control/protection: None   Lifestyle    Physical activity:     Days per week: Not on file     Minutes per session: Not on file    Stress: Not on file   Relationships    Social connections:     Talks on phone: Not on file     Gets together: Not on file     Attends Jewish service: Not on file     Active member of club or organization: Not on file     Attends meetings of clubs or organizations: Not on file     Relationship status: Not on file   Other Topics Concern    Are you pregnant or think you may be? Not Asked    Breast-feeding Not Asked   Social History Narrative    Not on file       FH:   Family History   Problem Relation Age of Onset    Breast cancer Maternal Aunt 49         x 2     Breast cancer Maternal Grandmother 65    Liver cancer Maternal Grandfather     Diabetes Paternal Grandmother     Hypertension Paternal Grandmother     Glaucoma Paternal Grandmother     Colon cancer Paternal Aunt     Cancer Father         lung cancer    Breast cancer Mother 68    Breast cancer Maternal Aunt 72    Melanoma Neg Hx     Ovarian cancer Neg Hx     Arrhythmia Neg Hx     Cardiomyopathy Neg Hx     Congenital heart disease Neg Hx     Early death Neg Hx     Heart attacks under age 50 Neg Hx     Pacemaker/defibrilator Neg Hx        OBHx:   OB History    Para Term  AB Living   4 3 3 0 0 3   SAB TAB Ectopic Multiple Live Births   0 0 0 0 3     "  # Outcome Date GA Lbr Yariel/2nd Weight Sex Delivery Anes PTL Lv   4 Current            3 Term 04/18/07    F Vag-Spont   NEELAM   2 Term 02/05/01    M Vag-Spont   NEELAM   1 Term 09/02/98    M Vag-Spont   NEELAM       Objective:       Ht 5' 7" (1.702 m)   Wt 95.8 kg (211 lb 3.2 oz)   LMP 09/02/2018 (Approximate)   Breastfeeding? No   BMI 33.08 kg/m²     Vitals:    05/21/19 0447   Weight: 95.8 kg (211 lb 3.2 oz)   Height: 5' 7" (1.702 m)       General:   alert, appears stated age and cooperative, no apparent distress   HENT:  normocephalic, atraumatic   Eyes:  extraocular movements and conjunctivae normal   Neck:  supple, range of motion normal, no thyromegaly   Lungs:   no respiratory distress   Heart:   regular rate   Abdomen:  soft, non-tender, non-distended but gravid, no rebound or guarding    Extremities negative edema, negative erythema   FHT: 150, moderate BTBV, +accels, -decels;  Cat 1 (reassuring)                 TOCO: Q 2-3 minutes   Presentations: cephalic by ultrasound   Cervix:     Dilation: 5    Effacement: 75%    Station:  -2    Consistency: medium    Position: middle          Assessment:       39w0d weeks gestation with     Active Hospital Problems    Diagnosis  POA    Encounter for induction of labor [Z34.90]  Not Applicable    Encounter for elective induction of labor [Z34.90]  Not Applicable      Resolved Hospital Problems   No resolved problems to display.          Plan:      Risks, benefits, alternatives and possible complications have been discussed in detail with the patient.   - Consents signed and to chart  - Admit to Labor and Delivery unit  - Epidural per Anesthesia  - Draw CBC, T&S  - Notify Staff  - Recheck in 2 hrs or PRN  - Induction via pitocin    DMII  - glucose q2 while in latent, q1 while in active, insulin GTT as needed  - Currently on metformin 500mg qdaily, will use SSI in immediate post delivery period until tolerating full diet to avoid hypoglycemia  - Insulin gtt while in labor " as needed    Hypothyroid  - Continue home synthroid    Post-Partum Hemorrhage risk - low    Loida Mason MD   PGY-4 Ob-gyn

## 2019-05-22 LAB
BASOPHILS # BLD AUTO: 0.06 K/UL (ref 0–0.2)
BASOPHILS NFR BLD: 0.7 % (ref 0–1.9)
DIFFERENTIAL METHOD: NORMAL
EOSINOPHIL # BLD AUTO: 0.1 K/UL (ref 0–0.5)
EOSINOPHIL NFR BLD: 1.5 % (ref 0–8)
ERYTHROCYTE [DISTWIDTH] IN BLOOD BY AUTOMATED COUNT: 13.3 % (ref 11.5–14.5)
HCT VFR BLD AUTO: 37.6 % (ref 37–48.5)
HGB BLD-MCNC: 12.7 G/DL (ref 12–16)
LYMPHOCYTES # BLD AUTO: 2.6 K/UL (ref 1–4.8)
LYMPHOCYTES NFR BLD: 30.2 % (ref 18–48)
MCH RBC QN AUTO: 30.8 PG (ref 27–31)
MCHC RBC AUTO-ENTMCNC: 33.8 G/DL (ref 32–36)
MCV RBC AUTO: 91 FL (ref 82–98)
MONOCYTES # BLD AUTO: 0.4 K/UL (ref 0.3–1)
MONOCYTES NFR BLD: 4.6 % (ref 4–15)
NEUTROPHILS # BLD AUTO: 5.5 K/UL (ref 1.8–7.7)
NEUTROPHILS NFR BLD: 62.8 % (ref 38–73)
PLATELET # BLD AUTO: 213 K/UL (ref 150–350)
PMV BLD AUTO: 11.4 FL (ref 9.2–12.9)
POCT GLUCOSE: 68 MG/DL (ref 70–110)
POCT GLUCOSE: 69 MG/DL (ref 70–110)
POCT GLUCOSE: 73 MG/DL (ref 70–110)
POCT GLUCOSE: 75 MG/DL (ref 70–110)
RBC # BLD AUTO: 4.13 M/UL (ref 4–5.4)
WBC # BLD AUTO: 8.72 K/UL (ref 3.9–12.7)

## 2019-05-22 PROCEDURE — 36415 COLL VENOUS BLD VENIPUNCTURE: CPT

## 2019-05-22 PROCEDURE — 25000003 PHARM REV CODE 250: Performed by: OBSTETRICS & GYNECOLOGY

## 2019-05-22 PROCEDURE — 25000003 PHARM REV CODE 250: Performed by: STUDENT IN AN ORGANIZED HEALTH CARE EDUCATION/TRAINING PROGRAM

## 2019-05-22 PROCEDURE — 85025 COMPLETE CBC W/AUTO DIFF WBC: CPT

## 2019-05-22 PROCEDURE — 11000001 HC ACUTE MED/SURG PRIVATE ROOM

## 2019-05-22 RX ORDER — IBUPROFEN 600 MG/1
600 TABLET ORAL EVERY 6 HOURS
Qty: 30 TABLET | Refills: 1 | Status: SHIPPED | OUTPATIENT
Start: 2019-05-22 | End: 2019-11-14

## 2019-05-22 RX ORDER — HYDROCODONE BITARTRATE AND ACETAMINOPHEN 5; 325 MG/1; MG/1
1 TABLET ORAL EVERY 4 HOURS PRN
Qty: 20 TABLET | Refills: 0 | Status: SHIPPED | OUTPATIENT
Start: 2019-05-22 | End: 2019-05-23

## 2019-05-22 RX ADMIN — IBUPROFEN 600 MG: 600 TABLET ORAL at 06:05

## 2019-05-22 RX ADMIN — DOCUSATE SODIUM 200 MG: 100 CAPSULE, LIQUID FILLED ORAL at 08:05

## 2019-05-22 RX ADMIN — METFORMIN HYDROCHLORIDE 500 MG: 500 TABLET, EXTENDED RELEASE ORAL at 08:05

## 2019-05-22 RX ADMIN — LEVOTHYROXINE SODIUM 125 MCG: 25 TABLET ORAL at 06:05

## 2019-05-22 RX ADMIN — IBUPROFEN 600 MG: 600 TABLET ORAL at 11:05

## 2019-05-22 NOTE — NURSING
Mom called RN to room, has requested to pump for baby in NICU.  [employer] Symphony pump, tubing, collections containers and labels brought to bedside.  Discussed proper pump setting of initiation phase.  Instructed on proper usage of pump and to adjust suction according to maximum comfort level.  Verified appropriate flange fit.  Educated on the frequency and duration of pumping in order to promote and maintain a full milk supply.  Hands on pumping technique reviewed.  Encouraged hand expression after pumping.  Instructed on cleaning of breast pump parts.  Mom verbalized understanding and appropriate recall for proper milk handling, collection, labeling, storage and transportation.

## 2019-05-22 NOTE — ADDENDUM NOTE
Addendum  created 05/22/19 1402 by Mikel Daniel MD    Intraprocedure Blocks edited, Sign clinical note

## 2019-05-22 NOTE — ANESTHESIA POSTPROCEDURE EVALUATION
Anesthesia Post Evaluation    Patient: Josefina Mark    Procedure(s) Performed: * No procedures listed *    Final Anesthesia Type: epidural  Patient location during evaluation: labor & delivery  Patient participation: Yes- Able to Participate  Level of consciousness: awake and alert  Post-procedure vital signs: reviewed and stable  Pain management: adequate  Airway patency: patent  PONV status at discharge: No PONV  Anesthetic complications: no      Cardiovascular status: blood pressure returned to baseline and stable  Respiratory status: room air, unassisted and spontaneous ventilation  Hydration status: euvolemic  Follow-up not needed.          Vitals Value Taken Time   /81 5/22/2019  7:15 AM   Temp 36.7 °C (98 °F) 5/22/2019  7:15 AM   Pulse 77 5/22/2019  7:15 AM   Resp 18 5/22/2019  7:15 AM   SpO2 100 % 5/22/2019  7:15 AM         No case tracking events are documented in the log.      Pain/Nasra Score: Pain Rating Prior to Med Admin: 2 (5/22/2019 11:21 AM)  Pain Rating Post Med Admin: 0 (5/22/2019 12:10 PM)

## 2019-05-23 VITALS
HEART RATE: 80 BPM | OXYGEN SATURATION: 98 % | HEIGHT: 67 IN | WEIGHT: 211.19 LBS | TEMPERATURE: 98 F | DIASTOLIC BLOOD PRESSURE: 75 MMHG | RESPIRATION RATE: 18 BRPM | BODY MASS INDEX: 33.15 KG/M2 | SYSTOLIC BLOOD PRESSURE: 125 MMHG

## 2019-05-23 LAB
POCT GLUCOSE: 70 MG/DL (ref 70–110)
POCT GLUCOSE: 76 MG/DL (ref 70–110)

## 2019-05-23 PROCEDURE — 99024 PR POST-OP FOLLOW-UP VISIT: ICD-10-PCS | Mod: ,,, | Performed by: OBSTETRICS & GYNECOLOGY

## 2019-05-23 PROCEDURE — 25000003 PHARM REV CODE 250: Performed by: STUDENT IN AN ORGANIZED HEALTH CARE EDUCATION/TRAINING PROGRAM

## 2019-05-23 PROCEDURE — 99024 POSTOP FOLLOW-UP VISIT: CPT | Mod: ,,, | Performed by: OBSTETRICS & GYNECOLOGY

## 2019-05-23 PROCEDURE — 25000003 PHARM REV CODE 250: Performed by: OBSTETRICS & GYNECOLOGY

## 2019-05-23 RX ORDER — HYDROCODONE BITARTRATE AND ACETAMINOPHEN 5; 325 MG/1; MG/1
1 TABLET ORAL EVERY 4 HOURS PRN
Qty: 20 TABLET | Refills: 0 | Status: SHIPPED | OUTPATIENT
Start: 2019-05-23 | End: 2019-11-14

## 2019-05-23 RX ADMIN — IBUPROFEN 600 MG: 600 TABLET ORAL at 05:05

## 2019-05-23 RX ADMIN — IBUPROFEN 600 MG: 600 TABLET ORAL at 01:05

## 2019-05-23 RX ADMIN — METFORMIN HYDROCHLORIDE 500 MG: 500 TABLET, EXTENDED RELEASE ORAL at 09:05

## 2019-05-23 RX ADMIN — LEVOTHYROXINE SODIUM 125 MCG: 25 TABLET ORAL at 05:05

## 2019-05-23 NOTE — DISCHARGE SUMMARY
Delivery Discharge Summary  Obstetrics      Primary OB Clinician: Yaritza Oden MD     Admission date: 2019  Discharge date: 2019    Disposition: To home, self care    Discharge Diagnosis List:      Patient Active Problem List   Diagnosis    Hypothyroidism affecting pregnancy in second trimester    Type 2 diabetes mellitus without complication, without long-term current use of insulin    Vitamin D insufficiency    Retinal hole of left eye    Diabetes mellitus type 2 without retinopathy    AMA (advanced maternal age) multigravida 35+, third trimester    Pre-existing type 2 diabetes mellitus during pregnancy in second trimester    Encounter for induction of labor    Encounter for elective induction of labor     (spontaneous vaginal delivery)       Procedure:     Hospital Course:  Josefina Mark is a 39 y.o. now , P**D #** who was admitted on 2019 at 39w0d for scheduled IOL. Patient was subsequently admitted to labor and delivery unit with signed consents.     Labor course was uncomplicated and resulted in  without complications.     Please see delivery note for further details. Her postpartum course was uncomplicated. On discharge day, patient's pain is controlled with oral pain medications. Pt is tolerating ambulation without SOB or CP, and regular diet without N/V. Reports lochia is mild. Denies any HA, vision changes, F/C, LE swelling. Denies any breast pain/soreness.    Pt in stable condition and ready for discharge. She has been instructed to start and/or continue medications and follow up with her obstetrics provider as listed below.    Pertinent studies:  CBC  Recent Labs   Lab 19  0525 19  0436   WBC 6.82 8.72   HGB 11.4* 12.7   HCT 33.9* 37.6   MCV 90 91    213      Immunization History   Administered Date(s) Administered    Influenza - Quadrivalent - PF 10/12/2016, 2017, 10/31/2018    Influenza A (H1N1) 2009 Monovalent - IM  11/10/2009    Tdap 02/28/2019        Delivery:    Episiotomy: None   Lacerations: 1st   Repair suture:     Repair # of packets: 2   Blood loss (ml): 250     Birth information:  YOB: 2020   Time of birth: 10:25 AM   Sex: female   Delivery type: Vaginal, Spontaneous   Gestational Age: 39w0d    Delivery Clinician:      Other providers:       Additional  information:  Forceps:    Vacuum:    Breech:    Observed anomalies      Living?:           APGARS  One minute Five minutes Ten minutes   Skin color:         Heart rate:         Grimace:         Muscle tone:         Breathing:         Totals: 3  6  9      Placenta: Delivered:       appearance      Patient Instructions:   Current Discharge Medication List      START taking these medications    Details   HYDROcodone-acetaminophen (NORCO) 5-325 mg per tablet Take 1 tablet by mouth every 4 (four) hours as needed.  Qty: 20 tablet, Refills: 0      ibuprofen (ADVIL,MOTRIN) 600 MG tablet Take 1 tablet (600 mg total) by mouth every 6 (six) hours.  Qty: 30 tablet, Refills: 1         CONTINUE these medications which have NOT CHANGED    Details   levothyroxine (SYNTHROID) 125 MCG tablet Take 1 tablet (125 mcg total) by mouth once daily.  Qty: 90 tablet, Refills: 3    Associated Diagnoses: Hypothyroidism, unspecified type      metFORMIN (GLUCOPHAGE-XR) 500 MG 24 hr tablet Take 1 tablet (500 mg total) by mouth once daily.  Qty: 90 tablet, Refills: 1    Associated Diagnoses: Type 2 diabetes mellitus without complication, without long-term current use of insulin      prenatal 25/iron fum/folic/dha (PRENATAL-1 ORAL) Take by mouth.         STOP taking these medications       blood sugar diagnostic Strp Comments:   Reason for Stopping:         blood-glucose meter kit Comments:   Reason for Stopping:         cholecalciferol, vitamin D3, (VITAMIN D3) 1,000 unit capsule Comments:   Reason for Stopping:         lancets 28 gauge Misc Comments:   Reason for Stopping:                Discharge Procedure Orders   Other restrictions (specify):   Order Comments: Nothing in the vagina for six weeks     Notify your health care provider if you experience any of the following:  temperature >100.4     Notify your health care provider if you experience any of the following:  persistent nausea and vomiting or diarrhea     Notify your health care provider if you experience any of the following:  severe uncontrolled pain     Notify your health care provider if you experience any of the following:  difficulty breathing or increased cough     Notify your health care provider if you experience any of the following:  severe persistent headache     Notify your health care provider if you experience any of the following:  worsening rash     Notify your health care provider if you experience any of the following:  persistent dizziness, light-headedness, or visual disturbances     Notify your health care provider if you experience any of the following:  increased confusion or weakness     Notify your health care provider if you experience any of the following:   Order Comments: Heavy vaginal bleeding >1 pad/hour for greater than 2 hours     Activity as tolerated       Follow-up Information     Yaritza Oden MD In 6 weeks.    Specialties:  Obstetrics and Gynecology, Obstetrics  Why:  post partum visit  Contact information:  [address] [place] [id number]  [phone]                    Aide Rice MD  OBGYN, PGY-1

## 2019-05-23 NOTE — PROGRESS NOTES
POSTPARTUM PROGRESS NOTE     Josefina Mark is a 39 y.o. female PPD #2 status post Spontaneous vaginal delivery at 39w0d in a pregnancy complicated by hypothyroidism and type II DM (on metformin).   Patient is doing well this morning. She denies nausea, vomiting, fever or chills.  Patient reports mild abdominal pain that is well relieved by oral pain medications. Lochia is mild. Patient is voiding without difficulty and ambulating with no difficulty. She has passed flatus, and has had BM.  Patient does plan to breast feed and is pumping for baby in NICU. Patient will defer to post partum visit for contraception.     Objective:       Temp:  [98 °F (36.7 °C)-98.3 °F (36.8 °C)] 98.3 °F (36.8 °C)  Pulse:  [74-77] 74  Resp:  [16-18] 16  SpO2:  [95 %-100 %] 95 %  BP: (115-126)/(72-81) 115/72    General:   alert, appears stated age and cooperative   Lungs:   clear to auscultation bilaterally   Heart:   regular rate and rhythm, S1, S2 normal, no murmur, click, rub or gallop   Abdomen:  soft, non-tender; bowel sounds normal; no masses,  no organomegaly   Uterus:  firm located at the umblicus.        Incision: clean, dry and intact   Extremities: peripheral pulses normal, no pedal edema, no clubbing or cyanosis     Lab Review  No results found for this or any previous visit (from the past 4 hour(s)).    I/O  No intake or output data in the 24 hours ending 19 0658     Assessment:     Patient Active Problem List   Diagnosis    Hypothyroidism affecting pregnancy in second trimester    Type 2 diabetes mellitus without complication, without long-term current use of insulin    Vitamin D insufficiency    Retinal hole of left eye    Diabetes mellitus type 2 without retinopathy    AMA (advanced maternal age) multigravida 35+, third trimester    Pre-existing type 2 diabetes mellitus during pregnancy in second trimester    Encounter for induction of labor    Encounter for elective induction of labor      (spontaneous vaginal delivery)        Plan:   1. Postpartum care:  - Patient doing well. Continue routine management and advances.  - Continue PO pain meds. Pain well controlled.  - Heme: H/h 13/38  From 12/34  - Encourage ambulation  - Contraception per primary ob   - Lactation consultation PRN  - Rh status A pos    2. Hypothyroidism  - continue home synthroid 125    3. Diabetes mellitus Type II   - on metformin 500   - fasting AM glucose 5/22 73  - continue to monitor       Dispo: As patient meets milestones, will plan to discharge PPD#2.    Aide Rice MD  OBGYN, PGY-1

## 2019-05-23 NOTE — LACTATION NOTE
"Pt pumping, using 30mm, flange assessment reveals flange is too large, areola entering tunnel, changed to 27mm and pt reports "the suction feels stronger with this size". Hand expression performed by pt after pumping, 4 large drops achieved.     LC did DC teaching for NICU mother pumping for her baby. Pt has NICU Mothers Lactation Booklet for lactation. Reviewed how to work pump, keep track of pumpings, label breastmilk, clean pump parts and safely store and transport milk. Pt aware to pump 8 or more times a day for 15-20 minutes. Pt is using a [employer] pump N style pump at home and is aware that she can use the Symphony breastpump at the baby's bedside when she visits. Mother has lactation phone number to call for further questions. Pt verbalized understanding.         05/23/19 1000   Maternal Assessment   Breast Shape round   Breast Density soft   Areola elastic   Nipples everted   Maternal Infant Feeding   Maternal Preparation hand hygiene   Maternal Emotional State anxious   Equipment Type   Breast Pump Type double electric, hospital grade   Breast Pump Flange Type hard   Breast Pump Flange Size 27 mm   Breast Pumping   Breast Pumping Interventions frequent pumping encouraged   Breast Pumping double electric breast pump utilized;bilateral breasts pumped until soft;pre-pumping breast massage;pre-pumping tactile stimulation  (post pumping hand expression)   Lactation Referrals   Lactation Referrals support group;outpatient lactation program     "

## 2019-05-23 NOTE — NURSING
Educated pt on discharge instructions on pt and baby. Pt stated understanding. RN answered all questions and concerns. Medication received, pt request to ambulate off unit with belongings to NICU. Pt stable and will continue to monitor.

## 2019-05-23 NOTE — PLAN OF CARE
Problem: Breastfeeding  Goal: Effective Breastfeeding  Based on parents stated goals, POC for today is to double pump 8 or more times in 24 hour period, using proper hand hygiene, setup, storage, transport, and cleaning. Pt to use techniques to maximize milk production, such as warm compresses, pump at baby bedside, smell used baby blankets. Pt to document pumpings in pump log. Pt to stay hydrated, eat well, and rest as needed. PT verbalized understanding.

## 2019-05-28 ENCOUNTER — TELEPHONE (OUTPATIENT)
Dept: OBSTETRICS AND GYNECOLOGY | Facility: OTHER | Age: 39
End: 2019-05-28

## 2019-06-25 ENCOUNTER — POSTPARTUM VISIT (OUTPATIENT)
Dept: OBSTETRICS AND GYNECOLOGY | Facility: CLINIC | Age: 39
End: 2019-06-25
Payer: COMMERCIAL

## 2019-06-25 VITALS
HEIGHT: 67 IN | SYSTOLIC BLOOD PRESSURE: 126 MMHG | BODY MASS INDEX: 29.55 KG/M2 | DIASTOLIC BLOOD PRESSURE: 68 MMHG | WEIGHT: 188.25 LBS

## 2019-06-25 PROCEDURE — 99999 PR PBB SHADOW E&M-EST. PATIENT-LVL III: CPT | Mod: PBBFAC,,, | Performed by: OBSTETRICS & GYNECOLOGY

## 2019-06-25 PROCEDURE — 99999 PR PBB SHADOW E&M-EST. PATIENT-LVL III: ICD-10-PCS | Mod: PBBFAC,,, | Performed by: OBSTETRICS & GYNECOLOGY

## 2019-06-25 NOTE — PROGRESS NOTES
"CC: Post-partum follow-up    Josefina Mark is a 39 y.o. female  presents for post-partum visit s/p a .    Delivery Date: May 21, 2019  Delivery MD: Yaritza Oden  Gender: female  Birth Weight: 8 pounds 4 ounces  Breast Feeding: YES, pumping only  Depression: NO  Contraception: vasectomy    Pregnancy was complicated by: DM 2, well controlled.       /68   Ht 5' 7" (1.702 m)   Wt 85.4 kg (188 lb 4.4 oz)   LMP 2018 (Approximate)   Breastfeeding? Yes   BMI 29.49 kg/m²     ROS:  GENERAL: No fever, chills, fatigability or weight loss.  VULVAR: No pain, no lesions and no itching.  VAGINAL: No relaxation, no itching, no discharge, no abnormal bleeding and no lesions.  ABDOMEN: No abdominal pain. Denies nausea. Denies vomiting. No diarrhea. No constipation  BREAST: Denies pain. No lumps. No discharge.  URINARY: No incontinence, no nocturia, no frequency and no dysuria.  CARDIOVASCULAR: No chest pain. No shortness of breath. No leg cramps.  NEUROLOGICAL: No headaches. No vision changes.    PHYSICAL EXAM:  PELVIC: EGBUS within normal limits, normal vagina and vulva    PROCEDURES: none        Diagnosis:  1. Routine postpartum follow-up        Plan:            Patient was counseled today on A.C.S. Pap guidelines and recommendations for yearly pelvic exams and monthly self breast exams; to see her PCP for other health maintenance.    FOLLOW UP: in 1 year for routine exam.       "

## 2019-06-27 DIAGNOSIS — E11.9 TYPE 2 DIABETES MELLITUS WITHOUT COMPLICATION, WITHOUT LONG-TERM CURRENT USE OF INSULIN: ICD-10-CM

## 2019-06-28 RX ORDER — METFORMIN HYDROCHLORIDE 500 MG/1
500 TABLET, EXTENDED RELEASE ORAL DAILY
Qty: 90 TABLET | Refills: 0 | Status: SHIPPED | OUTPATIENT
Start: 2019-06-28 | End: 2019-09-16 | Stop reason: SDUPTHER

## 2019-09-15 ENCOUNTER — PATIENT MESSAGE (OUTPATIENT)
Dept: PRIMARY CARE CLINIC | Facility: CLINIC | Age: 39
End: 2019-09-15

## 2019-09-15 DIAGNOSIS — E11.9 TYPE 2 DIABETES MELLITUS WITHOUT COMPLICATION, WITHOUT LONG-TERM CURRENT USE OF INSULIN: ICD-10-CM

## 2019-09-15 DIAGNOSIS — E03.9 HYPOTHYROIDISM, UNSPECIFIED TYPE: ICD-10-CM

## 2019-09-15 DIAGNOSIS — Z00.00 ROUTINE MEDICAL EXAM: Primary | ICD-10-CM

## 2019-09-17 RX ORDER — METFORMIN HYDROCHLORIDE 500 MG/1
500 TABLET, EXTENDED RELEASE ORAL DAILY
Qty: 90 TABLET | Refills: 0 | Status: SHIPPED | OUTPATIENT
Start: 2019-09-17 | End: 2019-11-14 | Stop reason: SDUPTHER

## 2019-10-31 ENCOUNTER — PATIENT OUTREACH (OUTPATIENT)
Dept: ADMINISTRATIVE | Facility: HOSPITAL | Age: 39
End: 2019-10-31

## 2019-11-08 ENCOUNTER — LAB VISIT (OUTPATIENT)
Dept: LAB | Facility: HOSPITAL | Age: 39
End: 2019-11-08
Attending: INTERNAL MEDICINE
Payer: COMMERCIAL

## 2019-11-08 DIAGNOSIS — E11.9 TYPE 2 DIABETES MELLITUS WITHOUT COMPLICATION, WITHOUT LONG-TERM CURRENT USE OF INSULIN: ICD-10-CM

## 2019-11-08 DIAGNOSIS — E03.9 HYPOTHYROIDISM, UNSPECIFIED TYPE: ICD-10-CM

## 2019-11-08 DIAGNOSIS — Z00.00 ROUTINE MEDICAL EXAM: ICD-10-CM

## 2019-11-08 LAB
ALBUMIN SERPL BCP-MCNC: 4.2 G/DL (ref 3.5–5.2)
ALP SERPL-CCNC: 68 U/L (ref 55–135)
ALT SERPL W/O P-5'-P-CCNC: 26 U/L (ref 10–44)
ANION GAP SERPL CALC-SCNC: 9 MMOL/L (ref 8–16)
AST SERPL-CCNC: 16 U/L (ref 10–40)
BASOPHILS # BLD AUTO: 0.05 K/UL (ref 0–0.2)
BASOPHILS NFR BLD: 0.8 % (ref 0–1.9)
BILIRUB SERPL-MCNC: 0.5 MG/DL (ref 0.1–1)
BUN SERPL-MCNC: 13 MG/DL (ref 6–20)
CALCIUM SERPL-MCNC: 9.4 MG/DL (ref 8.7–10.5)
CHLORIDE SERPL-SCNC: 103 MMOL/L (ref 95–110)
CHOLEST SERPL-MCNC: 151 MG/DL (ref 120–199)
CHOLEST/HDLC SERPL: 2.2 {RATIO} (ref 2–5)
CO2 SERPL-SCNC: 28 MMOL/L (ref 23–29)
CREAT SERPL-MCNC: 0.8 MG/DL (ref 0.5–1.4)
DIFFERENTIAL METHOD: ABNORMAL
EOSINOPHIL # BLD AUTO: 0.2 K/UL (ref 0–0.5)
EOSINOPHIL NFR BLD: 3.9 % (ref 0–8)
ERYTHROCYTE [DISTWIDTH] IN BLOOD BY AUTOMATED COUNT: 12.3 % (ref 11.5–14.5)
EST. GFR  (AFRICAN AMERICAN): >60 ML/MIN/1.73 M^2
EST. GFR  (NON AFRICAN AMERICAN): >60 ML/MIN/1.73 M^2
ESTIMATED AVG GLUCOSE: 111 MG/DL (ref 68–131)
GLUCOSE SERPL-MCNC: 76 MG/DL (ref 70–110)
HBA1C MFR BLD HPLC: 5.5 % (ref 4–5.6)
HCT VFR BLD AUTO: 36.9 % (ref 37–48.5)
HDLC SERPL-MCNC: 69 MG/DL (ref 40–75)
HDLC SERPL: 45.7 % (ref 20–50)
HGB BLD-MCNC: 12.6 G/DL (ref 12–16)
LDLC SERPL CALC-MCNC: 73.8 MG/DL (ref 63–159)
LYMPHOCYTES # BLD AUTO: 2.2 K/UL (ref 1–4.8)
LYMPHOCYTES NFR BLD: 35.4 % (ref 18–48)
MCH RBC QN AUTO: 30.1 PG (ref 27–31)
MCHC RBC AUTO-ENTMCNC: 34.1 G/DL (ref 32–36)
MCV RBC AUTO: 88 FL (ref 82–98)
MONOCYTES # BLD AUTO: 0.5 K/UL (ref 0.3–1)
MONOCYTES NFR BLD: 7.6 % (ref 4–15)
NEUTROPHILS # BLD AUTO: 3.3 K/UL (ref 1.8–7.7)
NEUTROPHILS NFR BLD: 52.3 % (ref 38–73)
NONHDLC SERPL-MCNC: 82 MG/DL
PLATELET # BLD AUTO: 234 K/UL (ref 150–350)
PMV BLD AUTO: 10 FL (ref 9.2–12.9)
POTASSIUM SERPL-SCNC: 4.2 MMOL/L (ref 3.5–5.1)
PROT SERPL-MCNC: 7.5 G/DL (ref 6–8.4)
RBC # BLD AUTO: 4.19 M/UL (ref 4–5.4)
SODIUM SERPL-SCNC: 140 MMOL/L (ref 136–145)
TRIGL SERPL-MCNC: 41 MG/DL (ref 30–150)
TSH SERPL DL<=0.005 MIU/L-ACNC: 2.66 UIU/ML (ref 0.4–4)
WBC # BLD AUTO: 6.22 K/UL (ref 3.9–12.7)

## 2019-11-08 PROCEDURE — 80061 LIPID PANEL: CPT

## 2019-11-08 PROCEDURE — 84443 ASSAY THYROID STIM HORMONE: CPT

## 2019-11-08 PROCEDURE — 83036 HEMOGLOBIN GLYCOSYLATED A1C: CPT

## 2019-11-08 PROCEDURE — 80053 COMPREHEN METABOLIC PANEL: CPT

## 2019-11-08 PROCEDURE — 85025 COMPLETE CBC W/AUTO DIFF WBC: CPT

## 2019-11-08 PROCEDURE — 36415 COLL VENOUS BLD VENIPUNCTURE: CPT

## 2019-11-10 ENCOUNTER — PATIENT MESSAGE (OUTPATIENT)
Dept: PRIMARY CARE CLINIC | Facility: CLINIC | Age: 39
End: 2019-11-10

## 2019-11-14 ENCOUNTER — OFFICE VISIT (OUTPATIENT)
Dept: PRIMARY CARE CLINIC | Facility: CLINIC | Age: 39
End: 2019-11-14
Payer: COMMERCIAL

## 2019-11-14 VITALS
WEIGHT: 203.5 LBS | HEART RATE: 74 BPM | SYSTOLIC BLOOD PRESSURE: 110 MMHG | DIASTOLIC BLOOD PRESSURE: 65 MMHG | OXYGEN SATURATION: 98 % | RESPIRATION RATE: 17 BRPM | HEIGHT: 67 IN | BODY MASS INDEX: 31.94 KG/M2 | TEMPERATURE: 98 F

## 2019-11-14 DIAGNOSIS — E03.9 HYPOTHYROIDISM, UNSPECIFIED TYPE: ICD-10-CM

## 2019-11-14 DIAGNOSIS — Z23 NEED FOR PNEUMOCOCCAL VACCINE: ICD-10-CM

## 2019-11-14 DIAGNOSIS — Z00.00 ROUTINE MEDICAL EXAM: Primary | ICD-10-CM

## 2019-11-14 DIAGNOSIS — E11.9 TYPE 2 DIABETES MELLITUS WITHOUT COMPLICATION, WITHOUT LONG-TERM CURRENT USE OF INSULIN: ICD-10-CM

## 2019-11-14 LAB
ALBUMIN/CREAT UR: 26.1 UG/MG (ref 0–30)
CREAT UR-MCNC: 138 MG/DL (ref 15–325)
MICROALBUMIN UR DL<=1MG/L-MCNC: 36 UG/ML

## 2019-11-14 PROCEDURE — 82043 UR ALBUMIN QUANTITATIVE: CPT

## 2019-11-14 PROCEDURE — 3044F HG A1C LEVEL LT 7.0%: CPT | Mod: CPTII,S$GLB,, | Performed by: INTERNAL MEDICINE

## 2019-11-14 PROCEDURE — 99999 PR PBB SHADOW E&M-EST. PATIENT-LVL III: CPT | Mod: PBBFAC,,, | Performed by: INTERNAL MEDICINE

## 2019-11-14 PROCEDURE — 99396 PREV VISIT EST AGE 40-64: CPT | Mod: 25,S$GLB,, | Performed by: INTERNAL MEDICINE

## 2019-11-14 PROCEDURE — 90471 IMMUNIZATION ADMIN: CPT | Mod: S$GLB,,, | Performed by: INTERNAL MEDICINE

## 2019-11-14 PROCEDURE — 3044F PR MOST RECENT HEMOGLOBIN A1C LEVEL <7.0%: ICD-10-PCS | Mod: CPTII,S$GLB,, | Performed by: INTERNAL MEDICINE

## 2019-11-14 PROCEDURE — 90732 PPSV23 VACC 2 YRS+ SUBQ/IM: CPT | Mod: S$GLB,,, | Performed by: INTERNAL MEDICINE

## 2019-11-14 PROCEDURE — 99999 PR PBB SHADOW E&M-EST. PATIENT-LVL III: ICD-10-PCS | Mod: PBBFAC,,, | Performed by: INTERNAL MEDICINE

## 2019-11-14 PROCEDURE — 99396 PR PREVENTIVE VISIT,EST,40-64: ICD-10-PCS | Mod: 25,S$GLB,, | Performed by: INTERNAL MEDICINE

## 2019-11-14 PROCEDURE — 90471 PNEUMOCOCCAL POLYSACCHARIDE VACCINE 23-VALENT =>2YO SQ IM: ICD-10-PCS | Mod: S$GLB,,, | Performed by: INTERNAL MEDICINE

## 2019-11-14 PROCEDURE — 90732 PNEUMOCOCCAL POLYSACCHARIDE VACCINE 23-VALENT =>2YO SQ IM: ICD-10-PCS | Mod: S$GLB,,, | Performed by: INTERNAL MEDICINE

## 2019-11-14 RX ORDER — LEVOTHYROXINE SODIUM 125 UG/1
125 TABLET ORAL DAILY
Qty: 90 TABLET | Refills: 3 | Status: SHIPPED | OUTPATIENT
Start: 2019-11-14 | End: 2021-01-29

## 2019-11-14 RX ORDER — METFORMIN HYDROCHLORIDE 500 MG/1
500 TABLET, EXTENDED RELEASE ORAL DAILY
Qty: 90 TABLET | Refills: 3 | Status: SHIPPED | OUTPATIENT
Start: 2019-11-14 | End: 2021-01-29

## 2019-11-14 NOTE — PROGRESS NOTES
Subjective:       Patient ID: Josefina Mark is a 39 y.o. female.    Chief Complaint: Annual Exam    Last seen by me 2 years ago. Had a baby since then. Returns for annual exam and f/u chronic medical conditions. No complaints, feeling well. Home glucose is generally below 100 fasting. Compliant with daily meds as prescribed. Labs in advanced detailed below.     PMH: .   Diabetes Type 2 diagnosed .  Hypothyroidism.   Vitamin D insufficiency.   Obesity.  Pap normal , Mammogram normal , Eye exam . Tdap , Flu shot 10/19.     PSH: None.     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     Family medical history: Lung cancer in father. Mother has interstitial cystitis, a grandmother has diabetes and hypertension, paternal grandfather with prostate cancer, maternal grandmother with breast cancer at age 58, maternal aunt x2 with breast cancer, maternal grandfather with liver cancer, paternal aunt had colon cancer.      NKDA.    Medications: Levothyroxine 125 mcg daily, Metformin  mg daily. Multivitamin.                   Review of Systems   Constitutional: Negative for activity change, appetite change, fatigue, fever and unexpected weight change.   HENT: Negative for congestion, hearing loss, rhinorrhea, sneezing, sore throat, trouble swallowing and voice change.    Eyes: Negative for pain, discharge and visual disturbance.   Respiratory: Negative for cough, chest tightness, shortness of breath and wheezing.    Cardiovascular: Negative for chest pain, palpitations and leg swelling.   Gastrointestinal: Negative for abdominal pain, blood in stool, constipation, diarrhea, nausea and vomiting.   Genitourinary: Negative for difficulty urinating, dysuria, flank pain, frequency, hematuria, menstrual problem and urgency.        Breastfeeding.    Musculoskeletal: Negative for arthralgias, back pain, joint swelling, myalgias and neck pain.   Skin: Negative for color  "change and rash.   Neurological: Negative for dizziness, syncope, facial asymmetry, speech difficulty, weakness, numbness and headaches.   Hematological: Negative for adenopathy. Does not bruise/bleed easily.   Psychiatric/Behavioral: Negative for agitation, dysphoric mood and sleep disturbance. The patient is not nervous/anxious.        Objective:    /65, Pulse 74, Temp 98.4, Ht 5' 7", Wt 203.5 lbs, BMI=31.9  Physical Exam   Constitutional: She is oriented to person, place, and time. She appears well-developed and well-nourished. No distress.   HENT:   Head: Normocephalic and atraumatic.   Right Ear: External ear normal.   Left Ear: External ear normal.   Nose: Nose normal.   Mouth/Throat: Oropharynx is clear and moist. No oropharyngeal exudate.   Eyes: Pupils are equal, round, and reactive to light. Conjunctivae and EOM are normal. Right conjunctiva is not injected. Left conjunctiva is not injected. No scleral icterus.   Neck: Normal range of motion. Neck supple. No JVD present. Carotid bruit is not present. No thyromegaly present.   Cardiovascular: Normal rate, regular rhythm, normal heart sounds and intact distal pulses. Exam reveals no gallop and no friction rub.   No murmur heard.  Pulmonary/Chest: Effort normal and breath sounds normal. No respiratory distress. She has no wheezes. She has no rhonchi. She has no rales.   Abdominal: Soft. Bowel sounds are normal. She exhibits no distension and no mass. There is no hepatosplenomegaly. There is no tenderness. There is no CVA tenderness.   Musculoskeletal: Normal range of motion. She exhibits no edema, tenderness or deformity.   Protective Sensation (w/ 10 gram monofilament):  Right: Intact  Left: Intact    Visual Inspection:  Normal -  Bilateral    Pedal Pulses:   Right: Present  Left: Present    Posterior tibialis:   Right:Present  Left: Present     Lymphadenopathy:     She has no cervical adenopathy.   Neurological: She is alert and oriented to person, " place, and time. She has normal strength and normal reflexes. No cranial nerve deficit. She exhibits normal muscle tone. Coordination and gait normal.   Skin: Skin is warm and dry. No lesion and no rash noted. She is not diaphoretic. No cyanosis or erythema. No pallor. Nails show no clubbing.   Psychiatric: She has a normal mood and affect. Her behavior is normal. Judgment and thought content normal.   Vitals reviewed.      11/8/19: CBC normal, CMP normal, Fasting Glucose 76, HbA1c 5.5%, TSH 2.66, TChol 151, TG 41, HDL 69, LDL 74.     Assessment:       1. Routine medical exam        Plan:       Routine medical exam    Type 2 diabetes mellitus without complication, without long-term current use of insulin  -     Microalbumin/creatinine urine ratio  -     metFORMIN (GLUCOPHAGE-XR) 500 MG 24 hr tablet; Take 1 tablet (500 mg total) by mouth once daily.  Dispense: 90 tablet; Refill: 3  -     Eye exam due in December.   -     Statin therapy deferred until no longer breastfeeding.     Hypothyroidism, unspecified type  -     levothyroxine (SYNTHROID) 125 MCG tablet; Take 1 tablet (125 mcg total) by mouth once daily.  Dispense: 90 tablet; Refill: 3    Need for pneumococcal vaccine  -     (In Office Administered) Pneumococcal Polysaccharide Vaccine (23 Valent) (SQ/IM)    Mammogram due, may postpone until done breastfeeding.

## 2020-04-09 ENCOUNTER — PATIENT MESSAGE (OUTPATIENT)
Dept: OBSTETRICS AND GYNECOLOGY | Facility: CLINIC | Age: 40
End: 2020-04-09

## 2020-04-09 NOTE — MEDICAL/APP STUDENT
Josefina Mark is a 40 y.o. female  who contacted the clinic with the chief complaint of pruritis and inflammation on the upper portion of her left breast.    The patient reports that she has continued breastfeeding since delivery of her baby in May of 2019. She states that her symptoms began last week and occur intermittently. She denies any tenderness or warmth overlying the affected area. She states that the area is slightly sensitive due to scratching. She has not experienced any abnormal breast tenderness or masses. She does report some nipple sensitivity which is consistent with pumping over the last 10 months. She denies fever, chills, or any other symptoms. She states that she has used some topical cortisone cream over the area but has not seen any improvement in her symptoms. The patient sent a picture of the affected area showing an erythematous area with excoriations on the left breast.    After discussing with the supervising physician, Dr. Lindquist, the patient was instructed to use topical benadryl cream and a hydrating ointment such as Aquaphor or Vaseline on the affected area. The patient was advised to use topical benadryl once in the morning, hydrating ointment once in the afternoon, and topical benadryl again before bedtime. The patient was also advised that she could take oral benadryl if she felt the topical cream was not having an effect. The patient was told to contact the clinic if she did not see improvement of her symptoms in the next two days.    The patient was also advised to contact the clinic if she developed symptoms consistent with mastitis, such as warmth/heat over the affected area, breast tenderness, development of a nodule in the breast, and fever and/or chills.

## 2020-04-21 DIAGNOSIS — Z01.84 ANTIBODY RESPONSE EXAMINATION: ICD-10-CM

## 2020-04-22 ENCOUNTER — LAB VISIT (OUTPATIENT)
Dept: LAB | Facility: HOSPITAL | Age: 40
End: 2020-04-22
Attending: INTERNAL MEDICINE
Payer: COMMERCIAL

## 2020-04-22 DIAGNOSIS — Z01.84 ANTIBODY RESPONSE EXAMINATION: ICD-10-CM

## 2020-04-22 LAB — SARS-COV-2 IGG SERPL QL IA: POSITIVE

## 2020-04-22 PROCEDURE — 86769 SARS-COV-2 COVID-19 ANTIBODY: CPT

## 2020-04-22 PROCEDURE — 36415 COLL VENOUS BLD VENIPUNCTURE: CPT

## 2020-09-29 ENCOUNTER — PATIENT MESSAGE (OUTPATIENT)
Dept: OTHER | Facility: OTHER | Age: 40
End: 2020-09-29

## 2020-11-05 ENCOUNTER — OFFICE VISIT (OUTPATIENT)
Dept: OPTOMETRY | Facility: CLINIC | Age: 40
End: 2020-11-05
Payer: COMMERCIAL

## 2020-11-05 DIAGNOSIS — H33.322 RETINAL HOLE OF LEFT EYE: ICD-10-CM

## 2020-11-05 DIAGNOSIS — Z01.00 EXAMINATION OF EYES AND VISION: Primary | ICD-10-CM

## 2020-11-05 DIAGNOSIS — H52.202 MYOPIA OF LEFT EYE WITH ASTIGMATISM: ICD-10-CM

## 2020-11-05 DIAGNOSIS — H52.4 ACCOMMODATIVE INSUFFICIENCY: ICD-10-CM

## 2020-11-05 DIAGNOSIS — H52.12 MYOPIA OF LEFT EYE WITH ASTIGMATISM: ICD-10-CM

## 2020-11-05 DIAGNOSIS — H52.11 MYOPIA OF RIGHT EYE: ICD-10-CM

## 2020-11-05 PROCEDURE — 92014 PR EYE EXAM, EST PATIENT,COMPREHESV: ICD-10-PCS | Mod: S$GLB,,, | Performed by: OPTOMETRIST

## 2020-11-05 PROCEDURE — 92015 PR REFRACTION: ICD-10-PCS | Mod: S$GLB,,, | Performed by: OPTOMETRIST

## 2020-11-05 PROCEDURE — 92015 DETERMINE REFRACTIVE STATE: CPT | Mod: S$GLB,,, | Performed by: OPTOMETRIST

## 2020-11-05 PROCEDURE — 99999 PR PBB SHADOW E&M-EST. PATIENT-LVL III: CPT | Mod: PBBFAC,,, | Performed by: OPTOMETRIST

## 2020-11-05 PROCEDURE — 92014 COMPRE OPH EXAM EST PT 1/>: CPT | Mod: S$GLB,,, | Performed by: OPTOMETRIST

## 2020-11-05 PROCEDURE — 99999 PR PBB SHADOW E&M-EST. PATIENT-LVL III: ICD-10-PCS | Mod: PBBFAC,,, | Performed by: OPTOMETRIST

## 2020-11-05 NOTE — PROGRESS NOTES
HPI     eye examination       Additional comments: Annual general eye examination and refraction.  Problems with near vision with glasses and contact lenses.    Came in without CLs in today.  Has run out of CLs.               Comments     Patient's age: 40 y.o. 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 Guerrier  Has previously seen Dr. Mi for follow up on retinal hole in OS.     No treatment ever done.  S    Wears glasses? Yes, when without CLs in       If yes, wears  Full-time or part-time?  Part-  Time, but wears glasses   much more than CLs.   Present glasses are: Bifocal, SV Distance, SV Reading?  SV  Distance   Approximate age of present glasses:  2 yrs+  Got new glasses following last exam, or subsequently?:  No    Any problem with VA with glasses?  problems with near vision with glasses      Wears CLs?:  Yes, but has run out of CLs.               If yes:               Type of CL worn:  Dailies Aqua Comfort Plus 8.67/14.2/-3.00 OU   -  only worn very occusionally.  Reports no problems with lenses, except   with near VA with CLs in                 Any problem with VA with CLs?  as noted above  Headaches?  No   Eye pain/discomfort?  No                                                                                     Flashes?  No   Floaters?  Occasional floaters (OU)  Diplopia/Double vision?  no   Patient's Ocular History:          Any eye surgeries? No          Any eye injury?  No          Any treatment for eye disease?  No - followed for retinal hole in   one eye (OS?) - no treatment  Family history of eye disease?  No   Significant patient medical history:         1. Diabetes?  Yes LBS 90 this morning      If yes, IDDM or NIDDM?  NIDDM x 2 years,  Takes Metformin     2. HBP?  No               ! OTC eyedrops currently using:  No    ! Prescription eye meds currently using:  No    ! Any history of allergy/adverse reaction to any eye meds  "used   previously?  No    ! Any history of allergy/adverse reaction to eyedrops used during prior   eye exam(s)? No    ! Any history of allergy/adverse reaction to Novacaine or similar meds?   No    ! Any history of allergy/adverse reaction to Epinephrine or similar meds?   No    ! Patient okay with use of anesthetic eyedrops to check eye pressure?    yes        ! Patient okay with use of eyedrops to dilate pupils today?  yes    !  Allergies/Medications/Medical History/Family History reviewed today?    Yes     PD 60/56   Reading distance 17"            Last edited by Pop Guerrier, OD on 11/5/2020  8:19 AM. (History)            Assessment /Plan     For exam results, see Encounter Report.    1. Examination of eyes and vision     2. Retinal hole of left eye  Ambulatory referral/consult to Ophthalmology   3. Myopia of right eye     4. Myopia of left eye with astigmatism     5. Accommodative insufficiency                  History of retinal hole in the left eye.  Has been followed by Dr. Mi.    Appears stable, but suggest recheck with Dr. Mi.  Assistant to schedule appointment.    Myopia (nearsightedness) in the right eye, and myopia with slight astigmatism in the left eye.  Good best-corrected VA in each eye.  Accommodative insufficiency vs early presbyopia  New spectacle lens Rx issued.     Currently wears daily disposable SCLs.  Only wears CLs occasionally.   No evidence of adverse effects secondary to contact lens wear.  Happy with lens fit and comfort.  Came in without SCLs in today.  Issued trial supply Dailies AquaComfort Plus contact lenses (-3.00 OU).  Single use.  Discard after each use.    Return when convenient for contact lens follow up visit.  Have CLs in for a few hours at the time of the appointment.  Will discuss then the need for reading glasses over CLs vs undercorrecting one eye for distance to achieve monovision effect.      Repeat general eye exam and refraction in one year - or " prior if any problems noted in the interim.

## 2020-11-05 NOTE — PATIENT INSTRUCTIONS
History of retinal hole in the left eye.  Has been followed by Dr. Mi.    Appears stable, but suggest recheck with Dr. Mi.  Assistant to schedule appointment.    Myopia (nearsightedness) in the right eye, and myopia with slight astigmatism in the left eye.  Good best-corrected VA in each eye.  Accommodative insufficiency vs early presbyopia  New spectacle lens Rx issued.     Currently wears daily disposable SCLs.  Only wears CLs occasionally.   No evidence of adverse effects secondary to contact lens wear.  Happy with lens fit and comfort.  Came in without SCLs in today.  Issued trial supply Iterable AquaComfort Plus contact lenses (-3.00 OU).  Single use.  Discard after each use.    Return when convenient for contact lens follow up visit.  Have CLs in for a few hours at the time of the appointment.  Will discuss then the need for reading glasses over CLs vs undercorrecting one eye for distance to achieve monovision effect.      Repeat general eye exam and refraction in one year - or prior if any problems noted in the interim.

## 2020-11-08 ENCOUNTER — OFFICE VISIT (OUTPATIENT)
Dept: URGENT CARE | Facility: CLINIC | Age: 40
End: 2020-11-08
Payer: COMMERCIAL

## 2020-11-08 VITALS
SYSTOLIC BLOOD PRESSURE: 128 MMHG | OXYGEN SATURATION: 98 % | DIASTOLIC BLOOD PRESSURE: 89 MMHG | HEIGHT: 67 IN | TEMPERATURE: 98 F | BODY MASS INDEX: 31.87 KG/M2 | HEART RATE: 95 BPM

## 2020-11-08 DIAGNOSIS — R09.81 CONGESTION OF NASAL SINUS: Primary | ICD-10-CM

## 2020-11-08 LAB
CTP QC/QA: YES
SARS-COV-2 RDRP RESP QL NAA+PROBE: NEGATIVE

## 2020-11-08 PROCEDURE — 99202 PR OFFICE/OUTPT VISIT, NEW, LEVL II, 15-29 MIN: ICD-10-PCS | Mod: S$GLB,,, | Performed by: STUDENT IN AN ORGANIZED HEALTH CARE EDUCATION/TRAINING PROGRAM

## 2020-11-08 PROCEDURE — 99202 OFFICE O/P NEW SF 15 MIN: CPT | Mod: S$GLB,,, | Performed by: STUDENT IN AN ORGANIZED HEALTH CARE EDUCATION/TRAINING PROGRAM

## 2020-11-08 PROCEDURE — U0002 COVID-19 LAB TEST NON-CDC: HCPCS | Mod: QW,S$GLB,, | Performed by: STUDENT IN AN ORGANIZED HEALTH CARE EDUCATION/TRAINING PROGRAM

## 2020-11-08 PROCEDURE — U0002: ICD-10-PCS | Mod: QW,S$GLB,, | Performed by: STUDENT IN AN ORGANIZED HEALTH CARE EDUCATION/TRAINING PROGRAM

## 2020-11-08 NOTE — PROGRESS NOTES
Subjective:       Patient ID: Josefina Mark is a 40 y.o. female.    Vitals:  vitals were not taken for this visit.     Chief Complaint: No chief complaint on file.    Pt is a 40 y.o. female presenting with COVID concerns. Pt's sister and SEMAJ are COVID+ (low risk exposure, no contact within 48h of their sx's or positive tests). Pt states she has a headache, fatigue, diarrhea and congestion that started today. Denied f/c, lower respiratory sx's.    Headache   This is a new problem. The current episode started today. The problem occurs constantly. The problem has been unchanged. The pain quality is not similar to prior headaches. The quality of the pain is described as aching. The pain is mild. Pertinent negatives include no abdominal pain, blurred vision, coughing, ear pain, eye redness, fever, muscle aches, nausea, neck pain, rhinorrhea, sinus pressure, sore throat, tinnitus or vomiting. Nothing aggravates the symptoms. She has tried acetaminophen for the symptoms. The treatment provided mild relief. There is no history of migraine headaches.       Constitution: Positive for fatigue. Negative for chills, sweating and fever.   HENT: Positive for congestion. Negative for ear pain, tinnitus, sinus pain, sinus pressure, sore throat and voice change.    Neck: Negative for neck pain and painful lymph nodes.   Eyes: Negative for eye discharge, eye itching, eye redness and blurred vision.   Respiratory: Negative for chest tightness, cough, sputum production, bloody sputum, COPD, shortness of breath, stridor, wheezing and asthma.    Gastrointestinal: Positive for diarrhea. Negative for abdominal pain, nausea and vomiting.   Musculoskeletal: Negative for muscle ache.   Skin: Negative for rash.   Allergic/Immunologic: Negative for seasonal allergies and asthma.   Neurological: Positive for headaches. Negative for history of migraines.   Hematologic/Lymphatic: Negative for swollen lymph nodes.       Objective:       Physical Exam   Constitutional: She is oriented to person, place, and time. She appears well-developed. She does not appear ill. No distress.   HENT:   Head: Normocephalic and atraumatic.   Ears:   Right Ear: External ear normal.   Left Ear: External ear normal.   Eyes: Conjunctivae and EOM are normal. Right eye exhibits no discharge. Left eye exhibits no discharge. No scleral icterus.   Neck: Normal range of motion. Neck supple.   Cardiovascular: Normal rate, regular rhythm and normal heart sounds.   Pulmonary/Chest: Effort normal and breath sounds normal. No respiratory distress.   Neurological: She is alert and oriented to person, place, and time. No cranial nerve deficit (CN II-XII grossly intact).   Vitals reviewed.    Recent Lab Results       11/08/20  [id number]         Acceptable Yes     SARS-CoV-2 RNA, Amplification, Qual Negative             Assessment:       1. Congestion of nasal sinus        Plan:         Congestion of nasal sinus  -     POCT COVID-19 Rapid Screening  - reviewed home quarantine protocol until 24 hours of symptom improvement and no fever without fever reducing medications in symptomatic patient with low risk exposure    Follow up if symptoms worsen or fail to improve.    Mata Pollack MD/MPH  [facility]  [facility]

## 2020-11-14 ENCOUNTER — OCCUPATIONAL HEALTH (OUTPATIENT)
Dept: URGENT CARE | Facility: CLINIC | Age: 40
End: 2020-11-14

## 2020-11-14 ENCOUNTER — TELEPHONE (OUTPATIENT)
Dept: PRIMARY CARE CLINIC | Facility: CLINIC | Age: 40
End: 2020-11-14

## 2020-11-14 DIAGNOSIS — J02.9 SORE THROAT: ICD-10-CM

## 2020-11-14 DIAGNOSIS — R53.83 FATIGUE, UNSPECIFIED TYPE: ICD-10-CM

## 2020-11-14 DIAGNOSIS — R51.9 HEADACHE, UNSPECIFIED HEADACHE TYPE: Primary | ICD-10-CM

## 2020-11-14 DIAGNOSIS — R51.9 HEADACHE, UNSPECIFIED HEADACHE TYPE: ICD-10-CM

## 2020-11-14 LAB
CTP QC/QA: YES
SARS-COV-2 RDRP RESP QL NAA+PROBE: NEGATIVE

## 2020-11-14 PROCEDURE — U0002 COVID-19 LAB TEST NON-CDC: HCPCS | Mod: QW,S$GLB,, | Performed by: INTERNAL MEDICINE

## 2020-11-14 PROCEDURE — U0002: ICD-10-PCS | Mod: QW,S$GLB,, | Performed by: INTERNAL MEDICINE

## 2020-11-14 NOTE — TELEPHONE ENCOUNTER
Patient has been notified of COVID test result on behalf of [employer] and provided clinical guidance on such. Cleared to RTW 11/15.

## 2020-11-27 ENCOUNTER — TELEPHONE (OUTPATIENT)
Dept: OPTOMETRY | Facility: CLINIC | Age: 40
End: 2020-11-27

## 2020-12-04 ENCOUNTER — PATIENT MESSAGE (OUTPATIENT)
Dept: SURGERY | Facility: CLINIC | Age: 40
End: 2020-12-04

## 2020-12-08 ENCOUNTER — CLINICAL SUPPORT (OUTPATIENT)
Dept: OTHER | Facility: CLINIC | Age: 40
End: 2020-12-08

## 2020-12-08 ENCOUNTER — TELEPHONE (OUTPATIENT)
Dept: OPTOMETRY | Facility: CLINIC | Age: 40
End: 2020-12-08

## 2020-12-08 DIAGNOSIS — Z00.8 ENCOUNTER FOR OTHER GENERAL EXAMINATION: ICD-10-CM

## 2020-12-08 NOTE — TELEPHONE ENCOUNTER
----- Message from Adolph Grande sent at 12/8/2020 11:02 AM CST -----  Hello  Patient picked up trial contacts on today

## 2020-12-09 VITALS — HEIGHT: 67 IN | BODY MASS INDEX: 31.87 KG/M2

## 2020-12-09 LAB
GLUCOSE SERPL-MCNC: 174 MG/DL (ref 60–140)
HDLC SERPL-MCNC: 60 MG/DL
POC CHOLESTEROL, LDL (DOCK): 75 MG/DL
POC CHOLESTEROL, TOTAL: 148 MG/DL
TRIGL SERPL-MCNC: 63 MG/DL

## 2020-12-10 ENCOUNTER — TELEPHONE (OUTPATIENT)
Dept: SURGERY | Facility: CLINIC | Age: 40
End: 2020-12-10

## 2020-12-10 DIAGNOSIS — Z12.31 SCREENING MAMMOGRAM, ENCOUNTER FOR: Primary | ICD-10-CM

## 2020-12-10 NOTE — TELEPHONE ENCOUNTER
Spoke to patient to schedule appt for high risk fu and scheduled her mmg.  Patient agreeable to both appointment date/times.

## 2020-12-23 ENCOUNTER — OFFICE VISIT (OUTPATIENT)
Dept: HEMATOLOGY/ONCOLOGY | Facility: CLINIC | Age: 40
End: 2020-12-23
Payer: COMMERCIAL

## 2020-12-23 ENCOUNTER — HOSPITAL ENCOUNTER (OUTPATIENT)
Dept: RADIOLOGY | Facility: HOSPITAL | Age: 40
Discharge: HOME | End: 2020-12-23
Attending: SURGERY
Payer: COMMERCIAL

## 2020-12-23 VITALS
WEIGHT: 218.25 LBS | OXYGEN SATURATION: 96 % | BODY MASS INDEX: 34.26 KG/M2 | RESPIRATION RATE: 18 BRPM | HEART RATE: 100 BPM | HEIGHT: 67 IN | TEMPERATURE: 98 F | SYSTOLIC BLOOD PRESSURE: 115 MMHG | DIASTOLIC BLOOD PRESSURE: 73 MMHG

## 2020-12-23 DIAGNOSIS — R92.30 DENSE BREAST TISSUE ON MAMMOGRAM: ICD-10-CM

## 2020-12-23 DIAGNOSIS — Z12.31 SCREENING MAMMOGRAM, ENCOUNTER FOR: ICD-10-CM

## 2020-12-23 DIAGNOSIS — Z91.89 AT HIGH RISK FOR BREAST CANCER: Primary | ICD-10-CM

## 2020-12-23 PROCEDURE — 77067 SCR MAMMO BI INCL CAD: CPT | Mod: TC

## 2020-12-23 PROCEDURE — 3008F BODY MASS INDEX DOCD: CPT | Mod: CPTII,S$GLB,, | Performed by: NURSE PRACTITIONER

## 2020-12-23 PROCEDURE — 3008F PR BODY MASS INDEX (BMI) DOCUMENTED: ICD-10-PCS | Mod: CPTII,S$GLB,, | Performed by: NURSE PRACTITIONER

## 2020-12-23 PROCEDURE — 99999 PR PBB SHADOW E&M-EST. PATIENT-LVL IV: ICD-10-PCS | Mod: PBBFAC,,, | Performed by: NURSE PRACTITIONER

## 2020-12-23 PROCEDURE — 99205 PR OFFICE/OUTPT VISIT, NEW, LEVL V, 60-74 MIN: ICD-10-PCS | Mod: S$GLB,,, | Performed by: NURSE PRACTITIONER

## 2020-12-23 PROCEDURE — 1126F AMNT PAIN NOTED NONE PRSNT: CPT | Mod: S$GLB,,, | Performed by: NURSE PRACTITIONER

## 2020-12-23 PROCEDURE — 77063 BREAST TOMOSYNTHESIS BI: CPT | Mod: 26,,, | Performed by: RADIOLOGY

## 2020-12-23 PROCEDURE — 1126F PR PAIN SEVERITY QUANTIFIED, NO PAIN PRESENT: ICD-10-PCS | Mod: S$GLB,,, | Performed by: NURSE PRACTITIONER

## 2020-12-23 PROCEDURE — 99999 PR PBB SHADOW E&M-EST. PATIENT-LVL IV: CPT | Mod: PBBFAC,,, | Performed by: NURSE PRACTITIONER

## 2020-12-23 PROCEDURE — 77063 MAMMO DIGITAL SCREENING BILAT WITH TOMO: ICD-10-PCS | Mod: 26,,, | Performed by: RADIOLOGY

## 2020-12-23 PROCEDURE — 77067 SCR MAMMO BI INCL CAD: CPT | Mod: 26,,, | Performed by: RADIOLOGY

## 2020-12-23 PROCEDURE — 77067 MAMMO DIGITAL SCREENING BILAT WITH TOMO: ICD-10-PCS | Mod: 26,,, | Performed by: RADIOLOGY

## 2020-12-23 PROCEDURE — 99205 OFFICE O/P NEW HI 60 MIN: CPT | Mod: S$GLB,,, | Performed by: NURSE PRACTITIONER

## 2020-12-23 NOTE — PROGRESS NOTES
Reason For Consultation:   Increased lifetime risk of breast cancer    Referring Provider:   Lida Garcia MD  [address] [place] [address]    Records Obtained: Records of the patients history including those obtained from the referring provider were reviewed and summarized in detail.    HPI:   Josefina Mark presents for consultation of increased risk of breast cancer. She is premenopausal. She presented for screening mammogram on 2020 which was benign but revealed a Tyrer-Cuzick score of 28.72%.     This follow up will consist of, but not limited to: reviewing her TC score, performing a CBE, and discussing various factors that determine high risk assessment.     Today, Feels good and no complaints.   No breast concerns.  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   .  High Risk Breast cancer specific history:  - Height:  5'7  - Weight:  218 lbs  - Breast density per BI-RADS:  Heterogeneously dense  - Age at menarche: 11  - Number of pregnancies: 4; age of first live birth: 18  - History of breast feeding: Yes - 13 months  - Age at menopause, if applicable:  N/A  -Uterus and ovaries intact: Yes  - HRT: No  - Genetic testing: No  - Personal history of cancer: No  - Previous chest radiation exposure between ages 10-30 years old: No  - Personal history of breast biopsy: No  - Ashkenazi Scientologist Inheritance: No  - Family history:    Mom breast cancer dx 68, 71  Maternal aunt breast cancer dx 72, 75  Maternal aunt breast cancer dx 49  62.   Maternal grandmother breast cancer - diagnosed 65, 66     SEE CALCULATED RISK BELOW.     Past Medical   Past Medical History:   Diagnosis Date    Diabetes mellitus, type 2     Fever blister     Hypothyroidism      Patient Active Problem List   Diagnosis    Hypothyroidism affecting pregnancy in second trimester    Type 2 diabetes mellitus without complication, without long-term current use of insulin    Vitamin D insufficiency    Retinal  hole of left eye    Diabetes mellitus type 2 without retinopathy    AMA (advanced maternal age) multigravida 35+, third trimester    Pre-existing type 2 diabetes mellitus during pregnancy in second trimester    Encounter for induction of labor    Encounter for elective induction of labor     Social History   Social History     Tobacco Use    Smoking status: Never Smoker    Smokeless tobacco: Never Used   Substance Use Topics    Alcohol use: No    Drug use: No     Family History  Family History   Problem Relation Age of Onset    Breast cancer Maternal Aunt 49         x 2     Breast cancer Maternal Grandmother 65    Liver cancer Maternal Grandfather     Diabetes Paternal Grandmother     Hypertension Paternal Grandmother     Glaucoma Paternal Grandmother     Colon cancer Paternal Aunt     Cancer Father         lung cancer    Breast cancer Mother 68    Breast cancer Maternal Aunt 72    Melanoma Neg Hx     Ovarian cancer Neg Hx     Arrhythmia Neg Hx     Cardiomyopathy Neg Hx     Congenital heart disease Neg Hx     Early death Neg Hx     Heart attacks under age 50 Neg Hx     Pacemaker/defibrilator Neg Hx      Medications    Current Outpatient Medications:     blood sugar diagnostic Strp, Use 2 (two) times daily., Disp: 50 each, Rfl: 3    levothyroxine (SYNTHROID) 125 MCG tablet, Take 1 tablet (125 mcg total) by mouth once daily., Disp: 90 tablet, Rfl: 3    metFORMIN (GLUCOPHAGE-XR) 500 MG 24 hr tablet, Take 1 tablet (500 mg total) by mouth once daily., Disp: 90 tablet, Rfl: 3    prenatal 25/iron fum/folic/dha (PRENATAL-1 ORAL), Take by mouth., Disp: , Rfl:   Allergies  Review of patient's allergies indicates:  No Known Allergies    Review of Systems:        See above   All other systems reviewed and are negative.    Objective:      Vitals: There were no vitals filed for this visit.  BMI: There is no height or weight on file to calculate BMI.   There is no height or weight on file to  calculate BSA.    Physical Exam  Vitals signs reviewed.   Constitutional:  Normal appearance. NAD.   HENT: Normocephalic.   Eyes: Pupils are equal, round, and reactive to light.   Cardiovascular: Normal rate.   Pulmonary: Pulmonary effort is normal.   Musculoskeletal: Normal range of motion.   Lymphadenopathy: No cervical adenopathy. No axillary adenopathy.   Skin: Skin is warm and dry.   Neurological:  She is alert and oriented to person, place, and time.   Psychiatric: Mood normal.     Breast Exam: Bilateral breast normal. No masses, no tenderness, no skin or nipple abnormalities.  No lymphadenopathy.       Laboratory Data: reviewed most recent       Imaging: reviewed most recent  Mammo Digital Screening Bilat w/ Terry  Narrative: Result:  Mammo Digital Screening Bilat w/ Terry    History:  Patient is 40 y.o. and is seen for a screening mammogram.    Films Compared:  Prior images (if available) were compared.    Findings:  This procedure was performed using tomosynthesis. Computer-aided detection   was utilized in the interpretation of this examination.     The breasts are heterogeneously dense, which may obscure small masses.   There is no evidence of suspicious masses, microcalcifications or   architectural distortion.  Impression: No mammographic evidence of malignancy.    BI-RADS Category 1: Negative    Recommendation:  Routine screening mammogram in 1 year is recommended.    Your estimated lifetime risk of breast cancer (to age 85) based on   Tyrer-Cuzick risk assessment model is Tyrer-Cuzick: 28.72 %. According to   the American Cancer Society, patients with a lifetime breast cancer risk   of 20% or higher might benefit from supplemental screening tests.         Assessment:     1. At high risk for breast cancer    2. Dense breast tissue on mammogram        1. Increased risk of breast cancer   * Tyrer-Cuzick (TC) lifetime risk of 25.4%. We discussed that TC score will categorize your lifetime risk of being  diagnosed with breast cancer. Categories are as such: Average risk <15%, Intermediate risk 15-19%, and High risk > or = to 20%. Risk factors are categorized into 2 groups: Modifiable and Non-modifiable. Modifiable risk factors include use of hormones, alcohol, smoking, diet and exercise. Non-modifiable risk factors include breast density, genetics, chest radiation, previous pregnancies, age of first period, and age of menopause.    * For women at high risk for breast cancer, endocrine therapy can reduce the risk of invasive and/or in situ breast cancers.   * Discussed that Tamoxifen 20 mg daily for 5 years has shown to reduce risk of breast cancer by 49% and women with ADH/ALH or LCIS have an even more significant reduction of risk of 86%. Aromatase inhibitors for 5 years have also shown risk reduction in terms of 50-60%. At current, there is not adequate data to recommend longer courses of therapy more than 5 years for risk reduction.    * Tamoxifen has limited data in BRCA 1/2 mutation carriers but limited retrospective data is suggestive of benefit. There is retrospective data that aromatase inhibitors can reduce the risk of contralateral ER positive breast cancers in BRCA 1/2 patients who were taking AIs as adjuvant therapy. There is no data for raloxifen in the population.    * Reviewed risks of Tamoxifen side effects include hot flashes, invasive endometrial cancer in women > 49 years of age (2.3/1000 compared to 0.9/1000), cataracts, increased risk of pulmonary embolism among others. A handout was given to her.   * Reviewed Lifestyle modifications which have shown benefit:  · Limit alcohol consumption to less than 1 drink per day (1 ounce liquor, 6 oz wine, 8 oz beer)  · Avoid smoking.  · Exercise at least 150 minutes per week of moderate intensity aerobic activity or at least 75 minutes of vigorous activity.   · Maintain healthy weight and avoid post-menopausal weight gain. Avoid processed foods and eat more  lean proteins, fruits and vegetables.                * Discussed available resources including genetic counseling, nutrition, weight management.    * Reviewed future screening:   · Semiannual CBE (every 6 months).   · Annual Breast MRI alternating with an annual MMG. We discussed that MRI's are expensive and she can contact her insurance company with questions regarding coverage. We also discussed that MRI's may have false positives and gadolinium is used but no data to date to suggest harm. She can not undergo an MRI if pregnant.   · Breast MRI for the detection of breast cancer requires administration of the contrast agent gadolinium. The use of MRI for breast cancer detection is based on the concept of  tumor angiogenesis or neovascularity. Tumor-associated blood vessels have increased permeability, which leads to prompt uptake and release of gadolinium within the first one to two minutes after administration, leading to a pattern of rapid enhancement and washout on MRI.   Bilateral breast examination - Both breasts should be evaluated in an MRI study, for comparison purposes, even when concern about possible pathology involves only one breast.  Contrast - Intravenous gadolinium contrast must be used to maximize cancer detection and is administered before breast MRI to highlight the neovascularity associated with cancers. Contrast is not necessary when the study is performed to evaluate silicone implant integrity.  Allergic and anaphylactoid reactions to gadolinium are rare, but can occur. In addition, in patients with renal failure, gadolinium can cause contrast nephropathy and/or nephrogenic systemic fibrosis.   A few studies have also reported gadolinium deposition in the brain from repeated intravenous administration, with the degree of deposition varying based on the specific contrast agent. The clinical significance of this deposition remains unknown, and no data for humans exist to show any adverse effects  at this time.   She understands that she may contact her insurance company with regards to coverage of MRI breasts.              Discussed with patient that there are several models available for stratifying breast cancer risk, and Bob-Dejuanck is presently the model utilized by [facility] and is a model recommended per current NCCN guidelines.    We discussed that there are limitations to every model for risk assessment, particularly that TC can overestimate risk in women with atypical hyperplasia and dense breasts and that Hallie underestimates risk for those with a strong family history of breast or ovarian cancers as well as non-white women with atypical hyperplasia which can make them appear to not be candidates for risk reducing therapies.         Plan:     1. Patient has opted out chemoprevention but will call in the future if she wishes to pursue.   2. Patient elects to proceed with alternating annual mammogram and annual breast MRI along with semiannual CBEs.  3. Patient will follow up with PCP or GYN for one semiannual CBE along with annual mammogram in December and will RTC here for one semiannual CBE along with annual breast MRI in June.  4. Lifestyle modifications as detailed above.   5.   Encouraged breast awareness, including monthly breast self-exams.   6.   Refer to Tung Mayes NP  for Genetic counseling. Mother opts out of testing   7.   Discussed available resources- nutritionist or bariatrician and weight management program. Opts out as starting weight loss group at work. Will call if she changes her mind      RTC in June to see me either at [facility] or on 3rd floor with a MRI breast on same day.   MMG screening in 1 year    Questions were encouraged and answered to patient's satisfaction, and patient verbalized understanding of information and agreement with the plan. Advised patient to RTC with any interval changes or concerns.      Patient is in agreement with the proposed treatment  plan. All questions were answered to the patient's satisfaction. Pt knows to call clinic for any new or worsening symptoms and if anything is needed before the next clinic visit.      NOEMI Moscoso-NATALYA  Hematology & Oncology  [facility] [place] [address]  ph. [phone]  Fax. [phone]     I spent 60 minutes (face to face) with the patient, more than 50% was in counseling and coordination of care as detailed above.

## 2020-12-23 NOTE — LETTER
December 23, 2020      Lida Garcia MD  [address]  [facility] [address]           [facility] 3rd Fl  [address]  [facility] [address]  Phone: [phone]          Patient: Josefina Mark   MR Number: [id number]   YOB: 1980   Date of Visit: 12/23/2020       Dear Dr. Lida Garcia:    Thank you for referring Josefina Mark to me for evaluation. Attached you will find relevant portions of my assessment and plan of care.    If you have questions, please do not hesitate to call me. I look forward to following Josefina Mark along with you.    Sincerely,    Christiano Morris, NP    Enclosure  CC:  No Recipients    If you would like to receive this communication electronically, please contact [email or web address] or [phone] to request more information on [employer] Link access.    For providers and/or their staff who would like to refer a patient to [facility], please contact us through our one-stop-shop provider referral line, [facility], at [phone].    If you feel you have received this communication in error or would no longer like to receive these types of communications, please e-mail [email or web address]

## 2020-12-23 NOTE — Clinical Note
RTC in June to see me either at [facility] or on 3rd floor with a MRI breast on same day.   MMG screening in 1 year

## 2020-12-23 NOTE — PATIENT INSTRUCTIONS
1. Increased risk of breast cancer   * Tyrer-Cuzick (TC) lifetime risk of 25.4%. We discussed that TC score will categorize your lifetime risk of being diagnosed with breast cancer. Categories are as such: Average risk <15%, Intermediate risk 15-19%, and High risk > or = to 20%. Risk factors are categorized into 2 groups: Modifiable and Non-modifiable. Modifiable risk factors include use of hormones, alcohol, smoking, diet and exercise. Non-modifiable risk factors include breast density, genetics, chest radiation, previous pregnancies, age of first period, and age of menopause.    * For women at high risk for breast cancer, endocrine therapy can reduce the risk of invasive and/or in situ breast cancers.   * Discussed that Tamoxifen 20 mg daily for 5 years has shown to reduce risk of breast cancer by 49% and women with ADH/ALH or LCIS have an even more significant reduction of risk of 86%. Aromatase inhibitors for 5 years have also shown risk reduction in terms of 50-60%. At current, there is not adequate data to recommend longer courses of therapy more than 5 years for risk reduction.    * Tamoxifen has limited data in BRCA 1/2 mutation carriers but limited retrospective data is suggestive of benefit. There is retrospective data that aromatase inhibitors can reduce the risk of contralateral ER positive breast cancers in BRCA 1/2 patients who were taking AIs as adjuvant therapy. There is no data for raloxifen in the population.    * Reviewed risks of Tamoxifen side effects include hot flashes, invasive endometrial cancer in women > 49 years of age (2.3/1000 compared to 0.9/1000), cataracts, increased risk of pulmonary embolism among others. A handout was given to her.   * Reviewed Lifestyle modifications which have shown benefit:  · Limit alcohol consumption to less than 1 drink per day (1 ounce liquor, 6 oz wine, 8 oz beer)  · Avoid smoking.  · Exercise at least 150 minutes per week of moderate intensity aerobic  activity or at least 75 minutes of vigorous activity.   · Maintain healthy weight and avoid post-menopausal weight gain. Avoid processed foods and eat more lean proteins, fruits and vegetables.                * Discussed available resources including genetic counseling, nutrition, weight management.    * Reviewed future screening:   · Semiannual CBE (every 6 months).   · Annual Breast MRI alternating with an annual MMG. We discussed that MRI's are expensive and she can contact her insurance company with questions regarding coverage. We also discussed that MRI's may have false positives and gadolinium is used but no data to date to suggest harm. She can not undergo an MRI if pregnant.   · Breast MRI for the detection of breast cancer requires administration of the contrast agent gadolinium. The use of MRI for breast cancer detection is based on the concept of  tumor angiogenesis or neovascularity. Tumor-associated blood vessels have increased permeability, which leads to prompt uptake and release of gadolinium within the first one to two minutes after administration, leading to a pattern of rapid enhancement and washout on MRI.   Bilateral breast examination - Both breasts should be evaluated in an MRI study, for comparison purposes, even when concern about possible pathology involves only one breast.  Contrast - Intravenous gadolinium contrast must be used to maximize cancer detection and is administered before breast MRI to highlight the neovascularity associated with cancers. Contrast is not necessary when the study is performed to evaluate silicone implant integrity.  Allergic and anaphylactoid reactions to gadolinium are rare, but can occur. In addition, in patients with renal failure, gadolinium can cause contrast nephropathy and/or nephrogenic systemic fibrosis.   A few studies have also reported gadolinium deposition in the brain from repeated intravenous administration, with the degree of deposition varying  based on the specific contrast agent. The clinical significance of this deposition remains unknown, and no data for humans exist to show any adverse effects at this time.   She understands that she may contact her insurance company with regards to coverage of MRI breasts.              Discussed with patient that there are several models available for stratifying breast cancer risk, and Cornelius is presently the model utilized by [facility] and is a model recommended per current NCCN guidelines.    We discussed that there are limitations to every model for risk assessment, particularly that TC can overestimate risk in women with atypical hyperplasia and dense breasts and that Hallie underestimates risk for those with a strong family history of breast or ovarian cancers as well as non-white women with atypical hyperplasia which can make them appear to not be candidates for risk reducing therapies.     Plan:  1. Refer to dariana Mayes for genetic counseling.   2. Patient will follow up with PCP or GYN for one semiannual CBE along with annual mammogram in December and will RTC here for one semiannual CBE along with annual breast MRI in June.

## 2020-12-24 ENCOUNTER — TELEPHONE (OUTPATIENT)
Dept: HEMATOLOGY/ONCOLOGY | Facility: CLINIC | Age: 40
End: 2020-12-24

## 2020-12-24 NOTE — TELEPHONE ENCOUNTER
----- Message from Christiano Morris NP sent at 12/23/2020 10:46 AM CST -----  Refer to dariana Mayes for genetic counseling.

## 2020-12-28 ENCOUNTER — IMMUNIZATION (OUTPATIENT)
Dept: INTERNAL MEDICINE | Facility: CLINIC | Age: 40
End: 2020-12-28
Payer: COMMERCIAL

## 2020-12-28 DIAGNOSIS — Z23 NEED FOR VACCINATION: ICD-10-CM

## 2020-12-28 PROCEDURE — 0001A COVID-19, MRNA, LNP-S, PF, 30 MCG/0.3 ML DOSE VACCINE: CPT | Mod: CV19,,, | Performed by: INTERNAL MEDICINE

## 2020-12-28 PROCEDURE — 91300 COVID-19, MRNA, LNP-S, PF, 30 MCG/0.3 ML DOSE VACCINE: CPT | Mod: ,,, | Performed by: INTERNAL MEDICINE

## 2020-12-28 PROCEDURE — 0001A COVID-19, MRNA, LNP-S, PF, 30 MCG/0.3 ML DOSE VACCINE: ICD-10-PCS | Mod: CV19,,, | Performed by: INTERNAL MEDICINE

## 2020-12-28 PROCEDURE — 91300 COVID-19, MRNA, LNP-S, PF, 30 MCG/0.3 ML DOSE VACCINE: ICD-10-PCS | Mod: ,,, | Performed by: INTERNAL MEDICINE

## 2021-01-04 ENCOUNTER — PATIENT MESSAGE (OUTPATIENT)
Dept: ADMINISTRATIVE | Facility: HOSPITAL | Age: 41
End: 2021-01-04

## 2021-01-18 ENCOUNTER — IMMUNIZATION (OUTPATIENT)
Dept: INTERNAL MEDICINE | Facility: CLINIC | Age: 41
End: 2021-01-18
Payer: COMMERCIAL

## 2021-01-18 DIAGNOSIS — Z23 NEED FOR VACCINATION: Primary | ICD-10-CM

## 2021-01-18 PROCEDURE — 0002A COVID-19, MRNA, LNP-S, PF, 30 MCG/0.3 ML DOSE VACCINE: CPT | Mod: PBBFAC | Performed by: INTERNAL MEDICINE

## 2021-01-18 PROCEDURE — 91300 COVID-19, MRNA, LNP-S, PF, 30 MCG/0.3 ML DOSE VACCINE: CPT | Mod: PBBFAC | Performed by: INTERNAL MEDICINE

## 2021-02-03 ENCOUNTER — PATIENT MESSAGE (OUTPATIENT)
Dept: HEMATOLOGY/ONCOLOGY | Facility: CLINIC | Age: 41
End: 2021-02-03

## 2021-02-03 ENCOUNTER — OFFICE VISIT (OUTPATIENT)
Dept: HEMATOLOGY/ONCOLOGY | Facility: CLINIC | Age: 41
End: 2021-02-03
Payer: COMMERCIAL

## 2021-02-03 DIAGNOSIS — Z80.3 FAMILY HISTORY OF BREAST CANCER: ICD-10-CM

## 2021-02-03 DIAGNOSIS — Z71.83 ENCOUNTER FOR NONPROCREATIVE GENETIC COUNSELING: Primary | ICD-10-CM

## 2021-02-03 PROCEDURE — 99214 PR OFFICE/OUTPT VISIT, EST, LEVL IV, 30-39 MIN: ICD-10-PCS | Mod: 95,,, | Performed by: NURSE PRACTITIONER

## 2021-02-03 PROCEDURE — 99214 OFFICE O/P EST MOD 30 MIN: CPT | Mod: 95,,, | Performed by: NURSE PRACTITIONER

## 2021-04-05 ENCOUNTER — PATIENT MESSAGE (OUTPATIENT)
Dept: ADMINISTRATIVE | Facility: HOSPITAL | Age: 41
End: 2021-04-05

## 2021-05-07 DIAGNOSIS — E03.9 HYPOTHYROIDISM, UNSPECIFIED TYPE: ICD-10-CM

## 2021-05-07 DIAGNOSIS — E11.9 TYPE 2 DIABETES MELLITUS WITHOUT COMPLICATION, WITHOUT LONG-TERM CURRENT USE OF INSULIN: ICD-10-CM

## 2021-05-07 RX ORDER — METFORMIN HYDROCHLORIDE 500 MG/1
TABLET, EXTENDED RELEASE ORAL
Qty: 90 TABLET | Refills: 0 | OUTPATIENT
Start: 2021-05-07

## 2021-05-07 RX ORDER — METFORMIN HYDROCHLORIDE 500 MG/1
500 TABLET, EXTENDED RELEASE ORAL DAILY
Qty: 90 TABLET | Refills: 0 | Status: SHIPPED | OUTPATIENT
Start: 2021-05-07 | End: 2021-07-21 | Stop reason: SDUPTHER

## 2021-05-07 RX ORDER — LEVOTHYROXINE SODIUM 125 UG/1
125 TABLET ORAL DAILY
Qty: 90 TABLET | Refills: 0 | Status: SHIPPED | OUTPATIENT
Start: 2021-05-07 | End: 2021-08-10

## 2021-05-07 RX ORDER — LEVOTHYROXINE SODIUM 125 UG/1
125 TABLET ORAL DAILY
Qty: 90 TABLET | Refills: 0 | OUTPATIENT
Start: 2021-05-07

## 2021-06-25 ENCOUNTER — LAB VISIT (OUTPATIENT)
Dept: LAB | Facility: HOSPITAL | Age: 41
End: 2021-06-25
Attending: INTERNAL MEDICINE
Payer: COMMERCIAL

## 2021-06-25 ENCOUNTER — OFFICE VISIT (OUTPATIENT)
Dept: PRIMARY CARE CLINIC | Facility: CLINIC | Age: 41
End: 2021-06-25
Payer: COMMERCIAL

## 2021-06-25 VITALS
DIASTOLIC BLOOD PRESSURE: 60 MMHG | HEART RATE: 82 BPM | HEIGHT: 67 IN | SYSTOLIC BLOOD PRESSURE: 106 MMHG | WEIGHT: 202.88 LBS | BODY MASS INDEX: 31.84 KG/M2

## 2021-06-25 DIAGNOSIS — E03.9 HYPOTHYROIDISM, UNSPECIFIED TYPE: ICD-10-CM

## 2021-06-25 DIAGNOSIS — E11.9 TYPE 2 DIABETES MELLITUS WITHOUT COMPLICATION, WITHOUT LONG-TERM CURRENT USE OF INSULIN: ICD-10-CM

## 2021-06-25 DIAGNOSIS — Z00.00 ROUTINE MEDICAL EXAM: ICD-10-CM

## 2021-06-25 DIAGNOSIS — Z00.00 ROUTINE MEDICAL EXAM: Primary | ICD-10-CM

## 2021-06-25 DIAGNOSIS — E66.9 OBESITY (BMI 30.0-34.9): ICD-10-CM

## 2021-06-25 DIAGNOSIS — B37.31 VAGINAL YEAST INFECTION: ICD-10-CM

## 2021-06-25 LAB
ALBUMIN SERPL BCP-MCNC: 4.1 G/DL (ref 3.5–5.2)
ALBUMIN/CREAT UR: 19 UG/MG (ref 0–30)
ALP SERPL-CCNC: 59 U/L (ref 55–135)
ALT SERPL W/O P-5'-P-CCNC: 64 U/L (ref 10–44)
ANION GAP SERPL CALC-SCNC: 10 MMOL/L (ref 8–16)
AST SERPL-CCNC: 30 U/L (ref 10–40)
BACTERIA #/AREA URNS AUTO: ABNORMAL /HPF
BILIRUB SERPL-MCNC: 0.5 MG/DL (ref 0.1–1)
BILIRUB UR QL STRIP: NEGATIVE
BUN SERPL-MCNC: 12 MG/DL (ref 6–20)
CALCIUM SERPL-MCNC: 9.8 MG/DL (ref 8.7–10.5)
CHLORIDE SERPL-SCNC: 99 MMOL/L (ref 95–110)
CHOLEST SERPL-MCNC: 156 MG/DL (ref 120–199)
CHOLEST/HDLC SERPL: 2.9 {RATIO} (ref 2–5)
CLARITY UR REFRACT.AUTO: ABNORMAL
CO2 SERPL-SCNC: 24 MMOL/L (ref 23–29)
COLOR UR AUTO: YELLOW
CREAT SERPL-MCNC: 0.9 MG/DL (ref 0.5–1.4)
CREAT UR-MCNC: 58 MG/DL (ref 15–325)
ERYTHROCYTE [DISTWIDTH] IN BLOOD BY AUTOMATED COUNT: 12.6 % (ref 11.5–14.5)
EST. GFR  (AFRICAN AMERICAN): >60 ML/MIN/1.73 M^2
EST. GFR  (NON AFRICAN AMERICAN): >60 ML/MIN/1.73 M^2
ESTIMATED AVG GLUCOSE: 301 MG/DL (ref 68–131)
GLUCOSE SERPL-MCNC: 371 MG/DL (ref 70–110)
GLUCOSE UR QL STRIP: ABNORMAL
HBA1C MFR BLD: 12.1 % (ref 4–5.6)
HCT VFR BLD AUTO: 39.6 % (ref 37–48.5)
HDLC SERPL-MCNC: 53 MG/DL (ref 40–75)
HDLC SERPL: 34 % (ref 20–50)
HGB BLD-MCNC: 13 G/DL (ref 12–16)
HGB UR QL STRIP: ABNORMAL
KETONES UR QL STRIP: ABNORMAL
LDLC SERPL CALC-MCNC: 87.4 MG/DL (ref 63–159)
LEUKOCYTE ESTERASE UR QL STRIP: NEGATIVE
MCH RBC QN AUTO: 29.2 PG (ref 27–31)
MCHC RBC AUTO-ENTMCNC: 32.8 G/DL (ref 32–36)
MCV RBC AUTO: 89 FL (ref 82–98)
MICROALBUMIN UR DL<=1MG/L-MCNC: 11 UG/ML
MICROSCOPIC COMMENT: ABNORMAL
NITRITE UR QL STRIP: NEGATIVE
NONHDLC SERPL-MCNC: 103 MG/DL
PH UR STRIP: 5 [PH] (ref 5–8)
PLATELET # BLD AUTO: 269 K/UL (ref 150–450)
PMV BLD AUTO: 10.8 FL (ref 9.2–12.9)
POTASSIUM SERPL-SCNC: 4.1 MMOL/L (ref 3.5–5.1)
PROT SERPL-MCNC: 7.6 G/DL (ref 6–8.4)
PROT UR QL STRIP: NEGATIVE
RBC # BLD AUTO: 4.45 M/UL (ref 4–5.4)
RBC #/AREA URNS AUTO: 83 /HPF (ref 0–4)
SODIUM SERPL-SCNC: 133 MMOL/L (ref 136–145)
SP GR UR STRIP: >=1.03 (ref 1–1.03)
SQUAMOUS #/AREA URNS AUTO: 1 /HPF
T4 FREE SERPL-MCNC: 1.34 NG/DL (ref 0.71–1.51)
TRIGL SERPL-MCNC: 78 MG/DL (ref 30–150)
TSH SERPL DL<=0.005 MIU/L-ACNC: 8.47 UIU/ML (ref 0.4–4)
URN SPEC COLLECT METH UR: ABNORMAL
WBC # BLD AUTO: 5.61 K/UL (ref 3.9–12.7)
WBC #/AREA URNS AUTO: 0 /HPF (ref 0–5)
YEAST UR QL AUTO: ABNORMAL

## 2021-06-25 PROCEDURE — 83036 HEMOGLOBIN GLYCOSYLATED A1C: CPT | Performed by: INTERNAL MEDICINE

## 2021-06-25 PROCEDURE — 85027 COMPLETE CBC AUTOMATED: CPT | Performed by: INTERNAL MEDICINE

## 2021-06-25 PROCEDURE — 99396 PR PREVENTIVE VISIT,EST,40-64: ICD-10-PCS | Mod: S$GLB,,, | Performed by: INTERNAL MEDICINE

## 2021-06-25 PROCEDURE — 84439 ASSAY OF FREE THYROXINE: CPT | Performed by: INTERNAL MEDICINE

## 2021-06-25 PROCEDURE — 80061 LIPID PANEL: CPT | Performed by: INTERNAL MEDICINE

## 2021-06-25 PROCEDURE — 3072F PR LOW RISK FOR RETINOPATHY: ICD-10-PCS | Mod: S$GLB,,, | Performed by: INTERNAL MEDICINE

## 2021-06-25 PROCEDURE — 99999 PR PBB SHADOW E&M-EST. PATIENT-LVL III: CPT | Mod: PBBFAC,,, | Performed by: INTERNAL MEDICINE

## 2021-06-25 PROCEDURE — 36415 COLL VENOUS BLD VENIPUNCTURE: CPT | Mod: PN | Performed by: INTERNAL MEDICINE

## 2021-06-25 PROCEDURE — 3008F BODY MASS INDEX DOCD: CPT | Mod: CPTII,S$GLB,, | Performed by: INTERNAL MEDICINE

## 2021-06-25 PROCEDURE — 1126F AMNT PAIN NOTED NONE PRSNT: CPT | Mod: S$GLB,,, | Performed by: INTERNAL MEDICINE

## 2021-06-25 PROCEDURE — 81001 URINALYSIS AUTO W/SCOPE: CPT | Performed by: INTERNAL MEDICINE

## 2021-06-25 PROCEDURE — 1126F PR PAIN SEVERITY QUANTIFIED, NO PAIN PRESENT: ICD-10-PCS | Mod: S$GLB,,, | Performed by: INTERNAL MEDICINE

## 2021-06-25 PROCEDURE — 3008F PR BODY MASS INDEX (BMI) DOCUMENTED: ICD-10-PCS | Mod: CPTII,S$GLB,, | Performed by: INTERNAL MEDICINE

## 2021-06-25 PROCEDURE — 3072F LOW RISK FOR RETINOPATHY: CPT | Mod: S$GLB,,, | Performed by: INTERNAL MEDICINE

## 2021-06-25 PROCEDURE — 82043 UR ALBUMIN QUANTITATIVE: CPT | Performed by: INTERNAL MEDICINE

## 2021-06-25 PROCEDURE — 84443 ASSAY THYROID STIM HORMONE: CPT | Performed by: INTERNAL MEDICINE

## 2021-06-25 PROCEDURE — 99396 PREV VISIT EST AGE 40-64: CPT | Mod: S$GLB,,, | Performed by: INTERNAL MEDICINE

## 2021-06-25 PROCEDURE — 80053 COMPREHEN METABOLIC PANEL: CPT | Performed by: INTERNAL MEDICINE

## 2021-06-25 PROCEDURE — 99999 PR PBB SHADOW E&M-EST. PATIENT-LVL III: ICD-10-PCS | Mod: PBBFAC,,, | Performed by: INTERNAL MEDICINE

## 2021-06-25 PROCEDURE — 82570 ASSAY OF URINE CREATININE: CPT | Performed by: INTERNAL MEDICINE

## 2021-06-25 RX ORDER — INSULIN PUMP SYRINGE, 3 ML
EACH MISCELLANEOUS
Qty: 1 EACH | Refills: 0 | Status: SHIPPED | OUTPATIENT
Start: 2021-06-25 | End: 2023-10-13

## 2021-06-25 RX ORDER — FLUCONAZOLE 150 MG/1
TABLET ORAL
COMMUNITY
Start: 2021-03-21 | End: 2021-06-25

## 2021-06-25 RX ORDER — LANCETS
1 EACH MISCELLANEOUS DAILY
Qty: 100 EACH | Refills: 3 | Status: SHIPPED | OUTPATIENT
Start: 2021-06-25

## 2021-06-25 RX ORDER — FLUCONAZOLE 150 MG/1
150 TABLET ORAL
COMMUNITY
Start: 2021-04-10 | End: 2021-06-25 | Stop reason: SDUPTHER

## 2021-06-25 RX ORDER — FLUCONAZOLE 150 MG/1
150 TABLET ORAL ONCE
Qty: 1 TABLET | Refills: 0 | Status: SHIPPED | OUTPATIENT
Start: 2021-06-25 | End: 2021-06-25

## 2021-07-16 ENCOUNTER — PATIENT MESSAGE (OUTPATIENT)
Dept: PRIMARY CARE CLINIC | Facility: CLINIC | Age: 41
End: 2021-07-16

## 2021-07-16 DIAGNOSIS — E11.9 TYPE 2 DIABETES MELLITUS WITHOUT COMPLICATION, WITHOUT LONG-TERM CURRENT USE OF INSULIN: ICD-10-CM

## 2021-07-16 DIAGNOSIS — E03.9 HYPOTHYROIDISM, UNSPECIFIED TYPE: Primary | ICD-10-CM

## 2021-07-21 RX ORDER — METFORMIN HYDROCHLORIDE 500 MG/1
1000 TABLET, EXTENDED RELEASE ORAL DAILY
Qty: 180 TABLET | Refills: 0 | Status: SHIPPED | OUTPATIENT
Start: 2021-07-21 | End: 2021-08-10 | Stop reason: SDUPTHER

## 2021-08-10 ENCOUNTER — PATIENT MESSAGE (OUTPATIENT)
Dept: PRIMARY CARE CLINIC | Facility: CLINIC | Age: 41
End: 2021-08-10

## 2021-09-17 ENCOUNTER — LAB VISIT (OUTPATIENT)
Dept: LAB | Facility: HOSPITAL | Age: 41
End: 2021-09-17
Attending: INTERNAL MEDICINE
Payer: COMMERCIAL

## 2021-09-17 DIAGNOSIS — E11.9 TYPE 2 DIABETES MELLITUS WITHOUT COMPLICATION, WITHOUT LONG-TERM CURRENT USE OF INSULIN: ICD-10-CM

## 2021-09-17 DIAGNOSIS — E03.9 HYPOTHYROIDISM, UNSPECIFIED TYPE: ICD-10-CM

## 2021-09-17 LAB — TSH SERPL DL<=0.005 MIU/L-ACNC: 1.84 UIU/ML (ref 0.4–4)

## 2021-09-17 PROCEDURE — 84443 ASSAY THYROID STIM HORMONE: CPT | Performed by: INTERNAL MEDICINE

## 2021-09-17 PROCEDURE — 83036 HEMOGLOBIN GLYCOSYLATED A1C: CPT | Performed by: INTERNAL MEDICINE

## 2021-09-17 PROCEDURE — 36415 COLL VENOUS BLD VENIPUNCTURE: CPT | Performed by: INTERNAL MEDICINE

## 2021-09-18 LAB
ESTIMATED AVG GLUCOSE: 134 MG/DL (ref 68–131)
HBA1C MFR BLD: 6.3 % (ref 4–5.6)

## 2021-10-05 ENCOUNTER — PATIENT MESSAGE (OUTPATIENT)
Dept: ADMINISTRATIVE | Facility: HOSPITAL | Age: 41
End: 2021-10-05

## 2021-10-08 ENCOUNTER — PATIENT MESSAGE (OUTPATIENT)
Dept: HEMATOLOGY/ONCOLOGY | Facility: CLINIC | Age: 41
End: 2021-10-08

## 2021-10-15 ENCOUNTER — PATIENT MESSAGE (OUTPATIENT)
Dept: PRIMARY CARE CLINIC | Facility: CLINIC | Age: 41
End: 2021-10-15

## 2021-10-15 DIAGNOSIS — E03.9 HYPOTHYROIDISM, UNSPECIFIED TYPE: ICD-10-CM

## 2021-10-15 DIAGNOSIS — E11.9 TYPE 2 DIABETES MELLITUS WITHOUT COMPLICATION, WITHOUT LONG-TERM CURRENT USE OF INSULIN: ICD-10-CM

## 2021-10-15 RX ORDER — METFORMIN HYDROCHLORIDE 500 MG/1
1000 TABLET, EXTENDED RELEASE ORAL 2 TIMES DAILY WITH MEALS
Qty: 360 TABLET | Refills: 2 | Status: SHIPPED | OUTPATIENT
Start: 2021-10-15 | End: 2023-01-14 | Stop reason: SDUPTHER

## 2021-10-15 RX ORDER — LEVOTHYROXINE SODIUM 125 UG/1
125 TABLET ORAL DAILY
Qty: 90 TABLET | Refills: 2 | Status: SHIPPED | OUTPATIENT
Start: 2021-10-15 | End: 2022-10-03

## 2021-10-22 ENCOUNTER — OFFICE VISIT (OUTPATIENT)
Dept: OBSTETRICS AND GYNECOLOGY | Facility: CLINIC | Age: 41
End: 2021-10-22
Payer: COMMERCIAL

## 2021-10-22 ENCOUNTER — HOSPITAL ENCOUNTER (OUTPATIENT)
Dept: RADIOLOGY | Facility: HOSPITAL | Age: 41
Discharge: HOME | End: 2021-10-22
Attending: NURSE PRACTITIONER
Payer: COMMERCIAL

## 2021-10-22 VITALS — WEIGHT: 189.63 LBS | BODY MASS INDEX: 29.76 KG/M2 | HEIGHT: 67 IN

## 2021-10-22 DIAGNOSIS — Z01.419 ENCOUNTER FOR GYNECOLOGICAL EXAMINATION: Primary | ICD-10-CM

## 2021-10-22 DIAGNOSIS — R92.30 DENSE BREAST TISSUE ON MAMMOGRAM: ICD-10-CM

## 2021-10-22 DIAGNOSIS — Z91.89 AT HIGH RISK FOR BREAST CANCER: ICD-10-CM

## 2021-10-22 DIAGNOSIS — N39.3 STRESS INCONTINENCE: ICD-10-CM

## 2021-10-22 DIAGNOSIS — Z12.4 PAP SMEAR FOR CERVICAL CANCER SCREENING: ICD-10-CM

## 2021-10-22 DIAGNOSIS — Z11.51 ENCOUNTER FOR SCREENING FOR HUMAN PAPILLOMAVIRUS (HPV): ICD-10-CM

## 2021-10-22 LAB
CREAT SERPL-MCNC: 0.8 MG/DL (ref 0.5–1.4)
SAMPLE: NORMAL

## 2021-10-22 PROCEDURE — 3061F NEG MICROALBUMINURIA REV: CPT | Mod: CPTII,S$GLB,, | Performed by: OBSTETRICS & GYNECOLOGY

## 2021-10-22 PROCEDURE — 77049 MRI BREAST C-+ W/CAD BI: CPT | Mod: TC

## 2021-10-22 PROCEDURE — 99396 PREV VISIT EST AGE 40-64: CPT | Mod: S$GLB,,, | Performed by: OBSTETRICS & GYNECOLOGY

## 2021-10-22 PROCEDURE — 99999 PR PBB SHADOW E&M-EST. PATIENT-LVL II: ICD-10-PCS | Mod: PBBFAC,,, | Performed by: OBSTETRICS & GYNECOLOGY

## 2021-10-22 PROCEDURE — A9577 INJ MULTIHANCE: HCPCS | Performed by: NURSE PRACTITIONER

## 2021-10-22 PROCEDURE — 99999 PR PBB SHADOW E&M-EST. PATIENT-LVL II: CPT | Mod: PBBFAC,,, | Performed by: OBSTETRICS & GYNECOLOGY

## 2021-10-22 PROCEDURE — 3066F NEPHROPATHY DOC TX: CPT | Mod: CPTII,S$GLB,, | Performed by: OBSTETRICS & GYNECOLOGY

## 2021-10-22 PROCEDURE — 3072F PR LOW RISK FOR RETINOPATHY: ICD-10-PCS | Mod: CPTII,S$GLB,, | Performed by: OBSTETRICS & GYNECOLOGY

## 2021-10-22 PROCEDURE — 99396 PR PREVENTIVE VISIT,EST,40-64: ICD-10-PCS | Mod: S$GLB,,, | Performed by: OBSTETRICS & GYNECOLOGY

## 2021-10-22 PROCEDURE — 3044F HG A1C LEVEL LT 7.0%: CPT | Mod: CPTII,S$GLB,, | Performed by: OBSTETRICS & GYNECOLOGY

## 2021-10-22 PROCEDURE — 88175 CYTOPATH C/V AUTO FLUID REDO: CPT | Performed by: OBSTETRICS & GYNECOLOGY

## 2021-10-22 PROCEDURE — 3008F BODY MASS INDEX DOCD: CPT | Mod: CPTII,S$GLB,, | Performed by: OBSTETRICS & GYNECOLOGY

## 2021-10-22 PROCEDURE — 87624 HPV HI-RISK TYP POOLED RSLT: CPT | Performed by: OBSTETRICS & GYNECOLOGY

## 2021-10-22 PROCEDURE — 77049 MRI BREAST C-+ W/CAD BI: CPT | Mod: 26,,, | Performed by: RADIOLOGY

## 2021-10-22 PROCEDURE — 3008F PR BODY MASS INDEX (BMI) DOCUMENTED: ICD-10-PCS | Mod: CPTII,S$GLB,, | Performed by: OBSTETRICS & GYNECOLOGY

## 2021-10-22 PROCEDURE — 3061F PR NEG MICROALBUMINURIA RESULT DOCUMENTED/REVIEW: ICD-10-PCS | Mod: CPTII,S$GLB,, | Performed by: OBSTETRICS & GYNECOLOGY

## 2021-10-22 PROCEDURE — 3066F PR DOCUMENTATION OF TREATMENT FOR NEPHROPATHY: ICD-10-PCS | Mod: CPTII,S$GLB,, | Performed by: OBSTETRICS & GYNECOLOGY

## 2021-10-22 PROCEDURE — 25500020 PHARM REV CODE 255: Performed by: NURSE PRACTITIONER

## 2021-10-22 PROCEDURE — 77049 MRI BREAST W/WO CONTRAST, W/CAD, BILATERAL: ICD-10-PCS | Mod: 26,,, | Performed by: RADIOLOGY

## 2021-10-22 PROCEDURE — 3072F LOW RISK FOR RETINOPATHY: CPT | Mod: CPTII,S$GLB,, | Performed by: OBSTETRICS & GYNECOLOGY

## 2021-10-22 PROCEDURE — 3044F PR MOST RECENT HEMOGLOBIN A1C LEVEL <7.0%: ICD-10-PCS | Mod: CPTII,S$GLB,, | Performed by: OBSTETRICS & GYNECOLOGY

## 2021-10-22 RX ADMIN — GADOBENATE DIMEGLUMINE 19 ML: 529 INJECTION, SOLUTION INTRAVENOUS at 12:10

## 2021-11-18 ENCOUNTER — CLINICAL SUPPORT (OUTPATIENT)
Dept: REHABILITATION | Facility: OTHER | Age: 41
End: 2021-11-18
Attending: OBSTETRICS & GYNECOLOGY
Payer: COMMERCIAL

## 2021-11-18 DIAGNOSIS — N39.3 STRESS INCONTINENCE: ICD-10-CM

## 2021-11-18 PROCEDURE — 97112 NEUROMUSCULAR REEDUCATION: CPT

## 2021-11-18 PROCEDURE — 97161 PT EVAL LOW COMPLEX 20 MIN: CPT

## 2021-12-21 ENCOUNTER — IMMUNIZATION (OUTPATIENT)
Dept: INTERNAL MEDICINE | Facility: CLINIC | Age: 41
End: 2021-12-21
Payer: COMMERCIAL

## 2021-12-21 DIAGNOSIS — Z23 NEED FOR VACCINATION: Primary | ICD-10-CM

## 2021-12-21 PROCEDURE — 0004A COVID-19, MRNA, LNP-S, PF, 30 MCG/0.3 ML DOSE VACCINE: CPT | Mod: CV19,PBBFAC | Performed by: INTERNAL MEDICINE

## 2022-01-18 ENCOUNTER — PATIENT MESSAGE (OUTPATIENT)
Dept: ADMINISTRATIVE | Facility: HOSPITAL | Age: 42
End: 2022-01-18
Payer: COMMERCIAL

## 2022-03-13 DIAGNOSIS — Z12.31 OTHER SCREENING MAMMOGRAM: ICD-10-CM

## 2022-05-19 DIAGNOSIS — R09.89 RUNNY NOSE: Primary | ICD-10-CM

## 2022-05-19 DIAGNOSIS — J02.9 SORE THROAT: ICD-10-CM

## 2022-05-19 DIAGNOSIS — R09.81 SINUS CONGESTION: ICD-10-CM

## 2022-05-19 LAB
CTP QC/QA: YES
SARS-COV-2 AG RESP QL IA.RAPID: NEGATIVE

## 2022-05-30 ENCOUNTER — PATIENT MESSAGE (OUTPATIENT)
Dept: ADMINISTRATIVE | Facility: HOSPITAL | Age: 42
End: 2022-05-30
Payer: COMMERCIAL

## 2022-06-07 ENCOUNTER — OFFICE VISIT (OUTPATIENT)
Dept: OPTOMETRY | Facility: CLINIC | Age: 42
End: 2022-06-07
Payer: COMMERCIAL

## 2022-06-07 DIAGNOSIS — H52.4 ACCOMMODATIVE INSUFFICIENCY: ICD-10-CM

## 2022-06-07 DIAGNOSIS — H33.322 RETINAL HOLE OF LEFT EYE: ICD-10-CM

## 2022-06-07 DIAGNOSIS — H52.11 MYOPIA OF RIGHT EYE: ICD-10-CM

## 2022-06-07 DIAGNOSIS — E11.9 TYPE 2 DIABETES MELLITUS WITHOUT RETINOPATHY: ICD-10-CM

## 2022-06-07 DIAGNOSIS — H52.12 MYOPIA OF LEFT EYE WITH ASTIGMATISM: ICD-10-CM

## 2022-06-07 DIAGNOSIS — H52.202 MYOPIA OF LEFT EYE WITH ASTIGMATISM: ICD-10-CM

## 2022-06-07 DIAGNOSIS — Z01.00 EXAMINATION OF EYES AND VISION: Primary | ICD-10-CM

## 2022-06-07 PROCEDURE — 92014 PR EYE EXAM, EST PATIENT,COMPREHESV: ICD-10-PCS | Mod: S$GLB,,, | Performed by: OPTOMETRIST

## 2022-06-07 PROCEDURE — 92015 PR REFRACTION: ICD-10-PCS | Mod: S$GLB,,, | Performed by: OPTOMETRIST

## 2022-06-07 PROCEDURE — 99999 PR PBB SHADOW E&M-EST. PATIENT-LVL III: ICD-10-PCS | Mod: PBBFAC,,, | Performed by: OPTOMETRIST

## 2022-06-07 PROCEDURE — 92015 DETERMINE REFRACTIVE STATE: CPT | Mod: S$GLB,,, | Performed by: OPTOMETRIST

## 2022-06-07 PROCEDURE — 99999 PR PBB SHADOW E&M-EST. PATIENT-LVL III: CPT | Mod: PBBFAC,,, | Performed by: OPTOMETRIST

## 2022-06-07 PROCEDURE — 92014 COMPRE OPH EXAM EST PT 1/>: CPT | Mod: S$GLB,,, | Performed by: OPTOMETRIST

## 2022-06-07 NOTE — PROGRESS NOTES
HPI     eye examination       Additional comments: General eye examination and refraction.  Would like updated spectacle lens Rx and updated CL Rx.  Came in without CLs in.  Last wore approx two weeks ago.  No problems with CLs.  Wears occasionally.               Comments     Patient's age: 42 y.o. 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 Guerrier  Wears glasses? Yes, when without CLs in       If yes, wears  Full-time or part-time? Full-time   Present glasses are: Bifocal, SV Distance, SV Reading?  SV  Distance   Approximate age of present glasses:  3 + years   Got new glasses following last exam, or subsequently?:  No    Any problem with VA with glasses? no    Wears CLs?:  Yes,               If yes:               Type of CL worn:  Dailies Aqua Comfort Plus 8.67/14.2/-3.00 OU   -  only worn very occusionally.                 Any problem with VA with CLs?  as noted above  Headaches?  No   Eye pain/discomfort?  No                                                                                     Flashes?  No   Floaters?  Occasional floaters (OU)  Diplopia/Double vision?  no   Patient's Ocular History:          Any eye surgeries? No          Any eye injury?  No          Any treatment for eye disease?  No - followed for retinal hole in   one eye (OS?) - no treatment  Family history of eye disease?  No   Significant patient medical history:         1. Diabetes?  Yes  yesterday       If yes, IDDM or NIDDM?  NIDDM x 2 years,  Takes Metformin     2. HBP?  No               ! OTC eyedrops currently using:  No    ! Prescription eye meds currently using:  No    ! Any history of allergy/adverse reaction to any eye meds used   previously?  No    ! Any history of allergy/adverse reaction to eyedrops used during prior   eye exam(s)? No    ! Any history of allergy/adverse reaction to Novacaine or similar meds?   No    ! Any history of allergy/adverse  "reaction to Epinephrine or similar meds?   No    ! Patient okay with use of anesthetic eyedrops to check eye pressure?    yes        ! Patient okay with use of eyedrops to dilate pupils today?  yes    !  Allergies/Medications/Medical History/Family History reviewed today?    Yes     PD 60/56   Reading distance 17"            Last edited by Pop Guerrier, OD on 6/7/2022 10:07 AM. (History)            Assessment /Plan     For exam results, see Encounter Report.    1. Examination of eyes and vision     2. Retinal hole of left eye     3. Type 2 diabetes mellitus without retinopathy     4. Myopia of right eye     5. Myopia of left eye with astigmatism     6. Accommodative insufficiency                    History of retinal hole in the left eye.  Has been followed by Dr. Mi.      Type 2 diabetes without evidence of diabetic retinopathy in either eye.      Myopia (nearsightedness) in the right eye, and myopia with slight astigmatism in the left eye.  Good best-corrected VA in each eye.  Accommodative insufficiency vs early presbyopia  New spectacle lens Rx issued.     Currently wears daily disposable SCLs.  Only wears CLs occasionally.   No evidence of adverse effects secondary to contact lens wear.  Happy with lens fit and comfort.  Came in without SCLs in today.  New Rx issued for  Dailies AquaComfort Plus contact lenses (-3.00 in the right eye and -2.75 in the left eye).  Single use.  Discard after each use.    Okay to use +1.00 or +1.25 OTC reading glasses as needed over the CLs.       Repeat general eye exam and refraction in one year - or prior if any problems noted in the interim.           "

## 2022-06-07 NOTE — PATIENT INSTRUCTIONS
History of retinal hole in the left eye.  Has been followed by Dr. Mi.      Type 2 diabetes without evidence of diabetic retinopathy in either eye.     Myopia (nearsightedness) in the right eye, and myopia with slight astigmatism in the left eye.  Good best-corrected VA in each eye.  Accommodative insufficiency vs early presbyopia  New spectacle lens Rx issued.     Currently wears daily disposable SCLs.  Only wears CLs occasionally.   No evidence of adverse effects secondary to contact lens wear.  Happy with lens fit and comfort.  Came in without SCLs in today.  New Rx issued for  DailiPaperFlies AquaComfort Plus contact lenses (-3.00 in the right eye and -2.75 in the left eye).  Single use.  Discard after each use.    Okay to use +1.00 or +1.25 OTC reading glasses as needed over the CLs.       Repeat general eye exam and refraction in one year - or prior if any problems noted in the interim.

## 2022-06-15 ENCOUNTER — PATIENT MESSAGE (OUTPATIENT)
Dept: ADMINISTRATIVE | Facility: HOSPITAL | Age: 42
End: 2022-06-15
Payer: COMMERCIAL

## 2022-06-22 DIAGNOSIS — E11.9 TYPE 2 DIABETES MELLITUS WITHOUT COMPLICATION: ICD-10-CM

## 2022-06-28 ENCOUNTER — HOSPITAL ENCOUNTER (OUTPATIENT)
Dept: RADIOLOGY | Facility: HOSPITAL | Age: 42
Discharge: HOME | End: 2022-06-28
Attending: INTERNAL MEDICINE
Payer: COMMERCIAL

## 2022-06-28 VITALS — BODY MASS INDEX: 29.66 KG/M2 | HEIGHT: 67 IN | WEIGHT: 189 LBS

## 2022-06-28 DIAGNOSIS — Z12.31 OTHER SCREENING MAMMOGRAM: ICD-10-CM

## 2022-06-28 PROCEDURE — 77063 BREAST TOMOSYNTHESIS BI: CPT | Mod: 26,,, | Performed by: RADIOLOGY

## 2022-06-28 PROCEDURE — 77067 SCR MAMMO BI INCL CAD: CPT | Mod: 26,,, | Performed by: RADIOLOGY

## 2022-06-28 PROCEDURE — 77063 MAMMO DIGITAL SCREENING BILAT WITH TOMO: ICD-10-PCS | Mod: 26,,, | Performed by: RADIOLOGY

## 2022-06-28 PROCEDURE — 77067 MAMMO DIGITAL SCREENING BILAT WITH TOMO: ICD-10-PCS | Mod: 26,,, | Performed by: RADIOLOGY

## 2022-06-28 PROCEDURE — 77063 BREAST TOMOSYNTHESIS BI: CPT | Mod: TC

## 2022-07-14 ENCOUNTER — PATIENT MESSAGE (OUTPATIENT)
Dept: PRIMARY CARE CLINIC | Facility: CLINIC | Age: 42
End: 2022-07-14
Payer: COMMERCIAL

## 2022-07-17 ENCOUNTER — PATIENT MESSAGE (OUTPATIENT)
Dept: OBSTETRICS AND GYNECOLOGY | Facility: CLINIC | Age: 42
End: 2022-07-17
Payer: COMMERCIAL

## 2022-07-17 DIAGNOSIS — R10.2 PELVIC PAIN: Primary | ICD-10-CM

## 2022-07-18 ENCOUNTER — PATIENT MESSAGE (OUTPATIENT)
Dept: OBSTETRICS AND GYNECOLOGY | Facility: CLINIC | Age: 42
End: 2022-07-18
Payer: COMMERCIAL

## 2022-07-18 NOTE — PROGRESS NOTES
Pt doing well. No vaginal bleeding, no loss of fluid, positive fetal movement, no contractions. Bleeding/labor/rom/fmc precautions.     Tdap done today. Labs today. RTC 4 weeks.   
Detail Level: Zone
Size Of Lesion In Cm (Optional): 0

## 2022-07-20 ENCOUNTER — HOSPITAL ENCOUNTER (OUTPATIENT)
Dept: RADIOLOGY | Facility: HOSPITAL | Age: 42
Discharge: HOME | End: 2022-07-20
Attending: OBSTETRICS & GYNECOLOGY
Payer: COMMERCIAL

## 2022-07-20 DIAGNOSIS — R10.2 PELVIC PAIN: ICD-10-CM

## 2022-07-20 PROCEDURE — 76856 US PELVIS COMP WITH TRANSVAG NON-OB (XPD): ICD-10-PCS | Mod: 26,,, | Performed by: STUDENT IN AN ORGANIZED HEALTH CARE EDUCATION/TRAINING PROGRAM

## 2022-07-20 PROCEDURE — 76830 US PELVIS COMP WITH TRANSVAG NON-OB (XPD): ICD-10-PCS | Mod: 26,,, | Performed by: STUDENT IN AN ORGANIZED HEALTH CARE EDUCATION/TRAINING PROGRAM

## 2022-07-20 PROCEDURE — 76830 TRANSVAGINAL US NON-OB: CPT | Mod: TC

## 2022-07-20 PROCEDURE — 76830 TRANSVAGINAL US NON-OB: CPT | Mod: 26,,, | Performed by: STUDENT IN AN ORGANIZED HEALTH CARE EDUCATION/TRAINING PROGRAM

## 2022-07-20 PROCEDURE — 76856 US EXAM PELVIC COMPLETE: CPT | Mod: 26,,, | Performed by: STUDENT IN AN ORGANIZED HEALTH CARE EDUCATION/TRAINING PROGRAM

## 2022-08-01 ENCOUNTER — PATIENT MESSAGE (OUTPATIENT)
Dept: OPTOMETRY | Facility: CLINIC | Age: 42
End: 2022-08-01
Payer: COMMERCIAL

## 2022-08-09 ENCOUNTER — OFFICE VISIT (OUTPATIENT)
Dept: URGENT CARE | Facility: CLINIC | Age: 42
End: 2022-08-09
Payer: COMMERCIAL

## 2022-08-09 ENCOUNTER — HOSPITAL ENCOUNTER (EMERGENCY)
Facility: HOSPITAL | Age: 42
Discharge: HOME | End: 2022-08-09
Attending: EMERGENCY MEDICINE
Payer: COMMERCIAL

## 2022-08-09 ENCOUNTER — PATIENT MESSAGE (OUTPATIENT)
Dept: OBSTETRICS AND GYNECOLOGY | Facility: CLINIC | Age: 42
End: 2022-08-09
Payer: COMMERCIAL

## 2022-08-09 VITALS
HEART RATE: 71 BPM | WEIGHT: 200 LBS | OXYGEN SATURATION: 97 % | RESPIRATION RATE: 18 BRPM | HEIGHT: 67 IN | BODY MASS INDEX: 31.39 KG/M2 | DIASTOLIC BLOOD PRESSURE: 79 MMHG | SYSTOLIC BLOOD PRESSURE: 104 MMHG | TEMPERATURE: 99 F

## 2022-08-09 VITALS
WEIGHT: 189 LBS | SYSTOLIC BLOOD PRESSURE: 144 MMHG | DIASTOLIC BLOOD PRESSURE: 92 MMHG | HEIGHT: 67 IN | HEART RATE: 76 BPM | TEMPERATURE: 99 F | BODY MASS INDEX: 29.66 KG/M2 | RESPIRATION RATE: 18 BRPM | OXYGEN SATURATION: 98 %

## 2022-08-09 DIAGNOSIS — N20.0 KIDNEY STONE ON RIGHT SIDE: Primary | ICD-10-CM

## 2022-08-09 DIAGNOSIS — R11.2 NAUSEA AND VOMITING, INTRACTABILITY OF VOMITING NOT SPECIFIED, UNSPECIFIED VOMITING TYPE: ICD-10-CM

## 2022-08-09 DIAGNOSIS — M54.9 RIGHT-SIDED BACK PAIN, UNSPECIFIED BACK LOCATION, UNSPECIFIED CHRONICITY: ICD-10-CM

## 2022-08-09 DIAGNOSIS — R10.9 ABDOMINAL PAIN, UNSPECIFIED ABDOMINAL LOCATION: ICD-10-CM

## 2022-08-09 DIAGNOSIS — R10.2 PELVIC PAIN: Primary | ICD-10-CM

## 2022-08-09 DIAGNOSIS — N13.9 ACUTE UNILATERAL OBSTRUCTIVE UROPATHY: ICD-10-CM

## 2022-08-09 LAB
ALBUMIN SERPL BCP-MCNC: 4.3 G/DL (ref 3.5–5.2)
ALP SERPL-CCNC: 54 U/L (ref 55–135)
ALT SERPL W/O P-5'-P-CCNC: 24 U/L (ref 10–44)
ANION GAP SERPL CALC-SCNC: 7 MMOL/L (ref 8–16)
AST SERPL-CCNC: 17 U/L (ref 10–40)
B-HCG UR QL: NEGATIVE
BACTERIA #/AREA URNS AUTO: ABNORMAL /HPF
BASOPHILS # BLD AUTO: 0.07 K/UL (ref 0–0.2)
BASOPHILS NFR BLD: 0.8 % (ref 0–1.9)
BILIRUB SERPL-MCNC: 0.4 MG/DL (ref 0.1–1)
BILIRUB UR QL STRIP: NEGATIVE
BILIRUB UR QL STRIP: NEGATIVE
BUN SERPL-MCNC: 11 MG/DL (ref 6–20)
CALCIUM SERPL-MCNC: 9.4 MG/DL (ref 8.7–10.5)
CHLORIDE SERPL-SCNC: 105 MMOL/L (ref 95–110)
CLARITY UR REFRACT.AUTO: CLEAR
CO2 SERPL-SCNC: 24 MMOL/L (ref 23–29)
COLOR UR AUTO: YELLOW
CREAT SERPL-MCNC: 0.9 MG/DL (ref 0.5–1.4)
CTP QC/QA: YES
DIFFERENTIAL METHOD: ABNORMAL
EOSINOPHIL # BLD AUTO: 0.1 K/UL (ref 0–0.5)
EOSINOPHIL NFR BLD: 0.8 % (ref 0–8)
ERYTHROCYTE [DISTWIDTH] IN BLOOD BY AUTOMATED COUNT: 12.5 % (ref 11.5–14.5)
EST. GFR  (NO RACE VARIABLE): >60 ML/MIN/1.73 M^2
GLUCOSE SERPL-MCNC: 198 MG/DL (ref 70–110)
GLUCOSE UR QL STRIP: ABNORMAL
GLUCOSE UR QL STRIP: POSITIVE
HCT VFR BLD AUTO: 38.2 % (ref 37–48.5)
HGB BLD-MCNC: 13 G/DL (ref 12–16)
HGB UR QL STRIP: ABNORMAL
HYALINE CASTS UR QL AUTO: 0 /LPF
IMM GRANULOCYTES # BLD AUTO: 0.03 K/UL (ref 0–0.04)
IMM GRANULOCYTES NFR BLD AUTO: 0.4 % (ref 0–0.5)
KETONES UR QL STRIP: ABNORMAL
KETONES UR QL STRIP: NEGATIVE
LEUKOCYTE ESTERASE UR QL STRIP: ABNORMAL
LEUKOCYTE ESTERASE UR QL STRIP: NEGATIVE
LIPASE SERPL-CCNC: 13 U/L (ref 4–60)
LYMPHOCYTES # BLD AUTO: 1.2 K/UL (ref 1–4.8)
LYMPHOCYTES NFR BLD: 14.4 % (ref 18–48)
MCH RBC QN AUTO: 30.2 PG (ref 27–31)
MCHC RBC AUTO-ENTMCNC: 34 G/DL (ref 32–36)
MCV RBC AUTO: 89 FL (ref 82–98)
MICROSCOPIC COMMENT: ABNORMAL
MONOCYTES # BLD AUTO: 0.3 K/UL (ref 0.3–1)
MONOCYTES NFR BLD: 3.4 % (ref 4–15)
NEUTROPHILS # BLD AUTO: 6.9 K/UL (ref 1.8–7.7)
NEUTROPHILS NFR BLD: 80.2 % (ref 38–73)
NITRITE UR QL STRIP: NEGATIVE
NRBC BLD-RTO: 0 /100 WBC
PH UR STRIP: 6 [PH] (ref 5–8)
PH, POC UA: 5 (ref 5–8)
PLATELET # BLD AUTO: 283 K/UL (ref 150–450)
PMV BLD AUTO: 10.3 FL (ref 9.2–12.9)
POC BLOOD, URINE: POSITIVE
POC NITRATES, URINE: NEGATIVE
POTASSIUM SERPL-SCNC: 4 MMOL/L (ref 3.5–5.1)
PROT SERPL-MCNC: 8 G/DL (ref 6–8.4)
PROT UR QL STRIP: ABNORMAL
PROT UR QL STRIP: POSITIVE
RBC # BLD AUTO: 4.31 M/UL (ref 4–5.4)
RBC #/AREA URNS AUTO: >100 /HPF (ref 0–4)
SODIUM SERPL-SCNC: 136 MMOL/L (ref 136–145)
SP GR UR STRIP: 1.02 (ref 1–1.03)
SP GR UR STRIP: >1.03 (ref 1–1.03)
URN SPEC COLLECT METH UR: ABNORMAL
UROBILINOGEN UR STRIP-ACNC: NORMAL (ref 0.1–1.1)
WBC # BLD AUTO: 8.54 K/UL (ref 3.9–12.7)
WBC #/AREA URNS AUTO: 10 /HPF (ref 0–5)
YEAST UR QL AUTO: ABNORMAL

## 2022-08-09 PROCEDURE — 80053 COMPREHEN METABOLIC PANEL: CPT | Performed by: EMERGENCY MEDICINE

## 2022-08-09 PROCEDURE — 87086 URINE CULTURE/COLONY COUNT: CPT | Performed by: NURSE PRACTITIONER

## 2022-08-09 PROCEDURE — 96374 THER/PROPH/DIAG INJ IV PUSH: CPT

## 2022-08-09 PROCEDURE — 1160F RVW MEDS BY RX/DR IN RCRD: CPT | Mod: CPTII,S$GLB,, | Performed by: NURSE PRACTITIONER

## 2022-08-09 PROCEDURE — 3008F PR BODY MASS INDEX (BMI) DOCUMENTED: ICD-10-PCS | Mod: CPTII,S$GLB,, | Performed by: NURSE PRACTITIONER

## 2022-08-09 PROCEDURE — 1160F PR REVIEW ALL MEDS BY PRESCRIBER/CLIN PHARMACIST DOCUMENTED: ICD-10-PCS | Mod: CPTII,S$GLB,, | Performed by: NURSE PRACTITIONER

## 2022-08-09 PROCEDURE — 99284 PR EMERGENCY DEPT VISIT,LEVEL IV: ICD-10-PCS | Mod: ,,, | Performed by: PHYSICIAN ASSISTANT

## 2022-08-09 PROCEDURE — 86803 HEPATITIS C AB TEST: CPT | Performed by: PHYSICIAN ASSISTANT

## 2022-08-09 PROCEDURE — 25000003 PHARM REV CODE 250: Performed by: PHYSICIAN ASSISTANT

## 2022-08-09 PROCEDURE — 3080F DIAST BP >= 90 MM HG: CPT | Mod: CPTII,S$GLB,, | Performed by: NURSE PRACTITIONER

## 2022-08-09 PROCEDURE — 81001 URINALYSIS AUTO W/SCOPE: CPT | Performed by: EMERGENCY MEDICINE

## 2022-08-09 PROCEDURE — 81025 URINE PREGNANCY TEST: CPT | Performed by: EMERGENCY MEDICINE

## 2022-08-09 PROCEDURE — 99285 EMERGENCY DEPT VISIT HI MDM: CPT | Mod: 25

## 2022-08-09 PROCEDURE — 25500020 PHARM REV CODE 255: Performed by: EMERGENCY MEDICINE

## 2022-08-09 PROCEDURE — 99214 OFFICE O/P EST MOD 30 MIN: CPT | Mod: S$GLB,,, | Performed by: NURSE PRACTITIONER

## 2022-08-09 PROCEDURE — 99214 PR OFFICE/OUTPT VISIT, EST, LEVL IV, 30-39 MIN: ICD-10-PCS | Mod: S$GLB,,, | Performed by: NURSE PRACTITIONER

## 2022-08-09 PROCEDURE — 99284 EMERGENCY DEPT VISIT MOD MDM: CPT | Mod: ,,, | Performed by: PHYSICIAN ASSISTANT

## 2022-08-09 PROCEDURE — 3080F PR MOST RECENT DIASTOLIC BLOOD PRESSURE >= 90 MM HG: ICD-10-PCS | Mod: CPTII,S$GLB,, | Performed by: NURSE PRACTITIONER

## 2022-08-09 PROCEDURE — 81003 POCT URINALYSIS, DIPSTICK, AUTOMATED, W/O SCOPE: ICD-10-PCS | Mod: QW,S$GLB,, | Performed by: NURSE PRACTITIONER

## 2022-08-09 PROCEDURE — 63600175 PHARM REV CODE 636 W HCPCS: Performed by: PHYSICIAN ASSISTANT

## 2022-08-09 PROCEDURE — 3077F PR MOST RECENT SYSTOLIC BLOOD PRESSURE >= 140 MM HG: ICD-10-PCS | Mod: CPTII,S$GLB,, | Performed by: NURSE PRACTITIONER

## 2022-08-09 PROCEDURE — 87389 HIV-1 AG W/HIV-1&-2 AB AG IA: CPT | Performed by: PHYSICIAN ASSISTANT

## 2022-08-09 PROCEDURE — 81003 URINALYSIS AUTO W/O SCOPE: CPT | Mod: QW,S$GLB,, | Performed by: NURSE PRACTITIONER

## 2022-08-09 PROCEDURE — 85025 COMPLETE CBC W/AUTO DIFF WBC: CPT | Performed by: EMERGENCY MEDICINE

## 2022-08-09 PROCEDURE — 96361 HYDRATE IV INFUSION ADD-ON: CPT

## 2022-08-09 PROCEDURE — 83690 ASSAY OF LIPASE: CPT | Performed by: EMERGENCY MEDICINE

## 2022-08-09 PROCEDURE — 1159F PR MEDICATION LIST DOCUMENTED IN MEDICAL RECORD: ICD-10-PCS | Mod: CPTII,S$GLB,, | Performed by: NURSE PRACTITIONER

## 2022-08-09 PROCEDURE — 3008F BODY MASS INDEX DOCD: CPT | Mod: CPTII,S$GLB,, | Performed by: NURSE PRACTITIONER

## 2022-08-09 PROCEDURE — 1159F MED LIST DOCD IN RCRD: CPT | Mod: CPTII,S$GLB,, | Performed by: NURSE PRACTITIONER

## 2022-08-09 PROCEDURE — 3077F SYST BP >= 140 MM HG: CPT | Mod: CPTII,S$GLB,, | Performed by: NURSE PRACTITIONER

## 2022-08-09 RX ORDER — ONDANSETRON 4 MG/1
TABLET, ORALLY DISINTEGRATING ORAL
Qty: 10 TABLET | Refills: 0 | Status: SHIPPED | OUTPATIENT
Start: 2022-08-09 | End: 2022-08-09 | Stop reason: SDUPTHER

## 2022-08-09 RX ORDER — KETOROLAC TROMETHAMINE 10 MG/1
10 TABLET, FILM COATED ORAL 2 TIMES DAILY PRN
Qty: 14 TABLET | Refills: 0 | Status: SHIPPED | OUTPATIENT
Start: 2022-08-09 | End: 2022-08-16

## 2022-08-09 RX ORDER — KETOROLAC TROMETHAMINE 30 MG/ML
10 INJECTION, SOLUTION INTRAMUSCULAR; INTRAVENOUS
Status: COMPLETED | OUTPATIENT
Start: 2022-08-09 | End: 2022-08-09

## 2022-08-09 RX ORDER — HYDROCODONE BITARTRATE AND ACETAMINOPHEN 10; 325 MG/1; MG/1
1 TABLET ORAL EVERY 6 HOURS PRN
Qty: 10 TABLET | Refills: 0 | Status: SHIPPED | OUTPATIENT
Start: 2022-08-09 | End: 2022-08-12

## 2022-08-09 RX ORDER — DICYCLOMINE HYDROCHLORIDE 20 MG/1
20 TABLET ORAL EVERY 6 HOURS
Qty: 120 TABLET | Refills: 0 | Status: SHIPPED | OUTPATIENT
Start: 2022-08-09 | End: 2022-09-08

## 2022-08-09 RX ORDER — TAMSULOSIN HYDROCHLORIDE 0.4 MG/1
0.4 CAPSULE ORAL DAILY
Qty: 14 CAPSULE | Refills: 0 | Status: SHIPPED | OUTPATIENT
Start: 2022-08-09 | End: 2022-08-23

## 2022-08-09 RX ORDER — ONDANSETRON 4 MG/1
4 TABLET, ORALLY DISINTEGRATING ORAL EVERY 6 HOURS PRN
Qty: 10 TABLET | Refills: 0 | Status: SHIPPED | OUTPATIENT
Start: 2022-08-09 | End: 2023-10-13

## 2022-08-09 RX ORDER — TAMSULOSIN HYDROCHLORIDE 0.4 MG/1
0.4 CAPSULE ORAL
Status: COMPLETED | OUTPATIENT
Start: 2022-08-09 | End: 2022-08-09

## 2022-08-09 RX ADMIN — KETOROLAC TROMETHAMINE 10 MG: 30 INJECTION, SOLUTION INTRAMUSCULAR; INTRAVENOUS at 07:08

## 2022-08-09 RX ADMIN — SODIUM CHLORIDE 1000 ML: 0.9 INJECTION, SOLUTION INTRAVENOUS at 07:08

## 2022-08-09 RX ADMIN — IOHEXOL 100 ML: 350 INJECTION, SOLUTION INTRAVENOUS at 05:08

## 2022-08-09 RX ADMIN — TAMSULOSIN HYDROCHLORIDE 0.4 MG: 0.4 CAPSULE ORAL at 07:08

## 2022-08-09 NOTE — PATIENT INSTRUCTIONS
Urine culture pending, we will call you with these results in 3-5 days.    If your condition worsens or fails to improve we recommend that you receive another evaluation at the ER immediately or contact your PCP to discuss your concerns or return here. You must understand that you've received an urgent care treatment only and that you may be released before all your medical problems are known or treated. You the patient will arrange for followup care as instructed.   Zofran as needed for nausea or vomiting.  Bentyl as needed for abdominal pain or cramping.   Maintain hydration by drinking small amounts of clear fluids frequently, then soft diet, and then advance diet as tolerated. May use OTC Imodium OR Pepto Bismol if desired for any diarrhea.    Watch for any increase pain, fever, localized pain to right lower abdomen or continued vomiting or diarrhea.

## 2022-08-09 NOTE — ED PROVIDER NOTES
Encounter Date: 8/9/2022       History     Chief Complaint   Patient presents with    Abdominal Pain     R lower abd pain sent fr0m uc     The patient is a 42 year old female, who has a past medical history of DM II, cold sores, and hypothyroidism. She denies any surgical history. She was sent to the ER from clinic to be evaluated for possible acute appendicitis. The patient reports having right lower abdominal pain that comes and goes for the past 3 months. She states that the pain seems to radiate to her right lower back as well. She states that the degree of pain is mild to moderate. She states that the course is intermittent. She denies any mitigating or exacerbating factors. Although, she notes that it does seem to increase closer to her menstrual cycle. She went to see her OBGYN for this last month and had a normal pelvic US. She went to [facility] over the weekend with family and her and her daughter both developed nausea, vomiting, diarrhea yesterday, that lasted about 24 hours, and has now subsided. She denies any vomiting or diarrhea today. She states that there pain is worse today than previously. She did try Naproxen for the pain with minimal relief. She went to clinic today and was deferred to the ER for a CT to r/o possible appendicitis.         Review of patient's allergies indicates:  No Known Allergies  Past Medical History:   Diagnosis Date    Diabetes mellitus, type 2     Fever blister     Hypothyroidism      History reviewed. No pertinent surgical history.  Family History   Problem Relation Age of Onset    Breast cancer Maternal Aunt 72        unilat    Breast cancer Maternal Grandmother         BILAT dx 10 mos apart    Diabetes Paternal Grandmother     Hypertension Paternal Grandmother     Glaucoma Paternal Grandmother     Heart failure Paternal Grandmother         CHF    Cancer Father         LUNG (smoked)    Breast cancer Mother 68        unilat    Breast cancer Maternal Aunt 49         contralat @ 61    Cancer Paternal Grandfather         LIVER?    Cancer Son 16        LYMPHOMA cutaneous T-cell    Kidney failure Maternal Aunt     Cancer Maternal Uncle         UNK ORIGIN    Down syndrome Maternal Uncle     Cancer Maternal Cousin         LUNG (smoked)    Cancer Paternal Aunt 65        BRAIN    Cancer Paternal Aunt         UNK ORIGIN    Cancer Paternal Aunt 68        LIVER    Early death Paternal Uncle         MVA    Melanoma Neg Hx     Ovarian cancer Neg Hx     Arrhythmia Neg Hx     Cardiomyopathy Neg Hx     Congenital heart disease Neg Hx     Heart attacks under age 50 Neg Hx     Pacemaker/defibrilator Neg Hx      Social History     Tobacco Use    Smoking status: Never Smoker    Smokeless tobacco: Never Used   Substance Use Topics    Alcohol use: No    Drug use: No     Review of Systems   Constitutional: Negative for activity change, appetite change, chills, fatigue and fever.   HENT: Negative for congestion, rhinorrhea and sore throat.    Eyes: Negative for visual disturbance.   Respiratory: Negative for cough and shortness of breath.    Cardiovascular: Negative for chest pain.   Gastrointestinal: Positive for abdominal pain, diarrhea, nausea and vomiting. Negative for abdominal distention, blood in stool and constipation.   Genitourinary: Negative for decreased urine volume, difficulty urinating, dysuria, frequency, pelvic pain, urgency, vaginal bleeding and vaginal discharge.   Musculoskeletal: Positive for back pain. Negative for gait problem and myalgias.   Skin: Negative for color change and rash.   Allergic/Immunologic: Negative for immunocompromised state.   Neurological: Negative for dizziness, syncope, weakness, light-headedness, numbness and headaches.   Psychiatric/Behavioral: Negative for confusion.       Physical Exam     Initial Vitals [08/09/22 1525]   BP Pulse Resp Temp SpO2   (!) 158/57 97 18 98.3 °F (36.8 °C) 98 %      MAP       --         Physical  Exam    Nursing note and vitals reviewed.  Constitutional: She appears well-developed and well-nourished. She is not diaphoretic. She appears distressed.   HENT:   Head: Normocephalic.   Eyes: Conjunctivae are normal. No scleral icterus.   Neck: Neck supple.   Cardiovascular: Normal rate.   Pulmonary/Chest: No respiratory distress.   Abdominal: Abdomen is soft. She exhibits no distension.   RLQ tenderness - mild  There is no rebound and no guarding.   Musculoskeletal:      Cervical back: Neck supple.      Comments: Right flank pain      Neurological: She is alert and oriented to person, place, and time. She has normal strength. No sensory deficit.   Skin: Skin is warm and dry.   Psychiatric: Her behavior is normal.         ED Course   Procedures  Labs Reviewed   CBC W/ AUTO DIFFERENTIAL - Abnormal; Notable for the following components:       Result Value    Gran % 80.2 (*)     Lymph % 14.4 (*)     Mono % 3.4 (*)     All other components within normal limits    Narrative:     Release to patient->Immediate   COMPREHENSIVE METABOLIC PANEL - Abnormal; Notable for the following components:    Glucose 198 (*)     Alkaline Phosphatase 54 (*)     Anion Gap 7 (*)     All other components within normal limits    Narrative:     Release to patient->Immediate   URINALYSIS, REFLEX TO URINE CULTURE - Abnormal; Notable for the following components:    Specific Gravity, UA >1.030 (*)     Protein, UA 1+ (*)     Glucose, UA 4+ (*)     Ketones, UA 2+ (*)     Occult Blood UA 3+ (*)     Leukocytes, UA 1+ (*)     All other components within normal limits    Narrative:     Specimen Source->Urine   URINALYSIS MICROSCOPIC - Abnormal; Notable for the following components:    RBC, UA >100 (*)     WBC, UA 10 (*)     All other components within normal limits    Narrative:     Specimen Source->Urine   LIPASE    Narrative:     Release to patient->Immediate   HIV 1 / 2 ANTIBODY   HEPATITIS C ANTIBODY   POCT URINE PREGNANCY     Results for orders  placed or performed during the hospital encounter of 08/09/22   CBC W/ AUTO DIFFERENTIAL   Result Value Ref Range    WBC 8.54 3.90 - 12.70 K/uL    RBC 4.31 4.00 - 5.40 M/uL    Hemoglobin 13.0 12.0 - 16.0 g/dL    Hematocrit 38.2 37.0 - 48.5 %    MCV 89 82 - 98 fL    MCH 30.2 27.0 - 31.0 pg    MCHC 34.0 32.0 - 36.0 g/dL    RDW 12.5 11.5 - 14.5 %    Platelets 283 150 - 450 K/uL    MPV 10.3 9.2 - 12.9 fL    Immature Granulocytes 0.4 0.0 - 0.5 %    Gran # (ANC) 6.9 1.8 - 7.7 K/uL    Immature Grans (Abs) 0.03 0.00 - 0.04 K/uL    Lymph # 1.2 1.0 - 4.8 K/uL    Mono # 0.3 0.3 - 1.0 K/uL    Eos # 0.1 0.0 - 0.5 K/uL    Baso # 0.07 0.00 - 0.20 K/uL    nRBC 0 0 /100 WBC    Gran % 80.2 (H) 38.0 - 73.0 %    Lymph % 14.4 (L) 18.0 - 48.0 %    Mono % 3.4 (L) 4.0 - 15.0 %    Eosinophil % 0.8 0.0 - 8.0 %    Basophil % 0.8 0.0 - 1.9 %    Differential Method Automated    Comp. Metabolic Panel   Result Value Ref Range    Sodium 136 136 - 145 mmol/L    Potassium 4.0 3.5 - 5.1 mmol/L    Chloride 105 95 - 110 mmol/L    CO2 24 23 - 29 mmol/L    Glucose 198 (H) 70 - 110 mg/dL    BUN 11 6 - 20 mg/dL    Creatinine 0.9 0.5 - 1.4 mg/dL    Calcium 9.4 8.7 - 10.5 mg/dL    Total Protein 8.0 6.0 - 8.4 g/dL    Albumin 4.3 3.5 - 5.2 g/dL    Total Bilirubin 0.4 0.1 - 1.0 mg/dL    Alkaline Phosphatase 54 (L) 55 - 135 U/L    AST 17 10 - 40 U/L    ALT 24 10 - 44 U/L    Anion Gap 7 (L) 8 - 16 mmol/L    eGFR >60.0 >60 mL/min/1.73 m^2   Lipase   Result Value Ref Range    Lipase 13 4 - 60 U/L   Urinalysis, Reflex to Urine Culture Urine, Clean Catch    Specimen: Urine   Result Value Ref Range    Specimen UA Urine, Clean Catch     Color, UA Yellow Yellow, Straw, Viviane    Appearance, UA Clear Clear    pH, UA 6.0 5.0 - 8.0    Specific Gravity, UA >1.030 (A) 1.005 - 1.030    Protein, UA 1+ (A) Negative    Glucose, UA 4+ (A) Negative    Ketones, UA 2+ (A) Negative    Bilirubin (UA) Negative Negative    Occult Blood UA 3+ (A) Negative    Nitrite, UA Negative Negative     Leukocytes, UA 1+ (A) Negative   Urinalysis Microscopic   Result Value Ref Range    RBC, UA >100 (H) 0 - 4 /hpf    WBC, UA 10 (H) 0 - 5 /hpf    Bacteria None None-Occ /hpf    Yeast, UA None None    Hyaline Casts, UA 0 0-1/lpf /lpf    Microscopic Comment SEE COMMENT    POCT urine pregnancy   Result Value Ref Range    POC Preg Test, Ur Negative Negative     Acceptable Yes             Imaging Results           CT Abdomen Pelvis With Contrast (Final result)  Result time 08/09/22 18:23:43    Final result by Chris Pérez MD (08/09/22 18:23:43)                 Impression:      1. Moderate right-sided hydronephrosis secondary to a 1.5 cm calculus at the UPJ.  Asymmetric right perinephric and proximal periureteral fat stranding with slight delayed enhancement of the right kidney presumably related to obstructive uropathy, with superimposed pyeloureteronephritis not excluded.  Clinical correlation and with urinalysis advised.  2. Normal appendix.  3. Few additional findings as above.  This report was flagged in Epic as abnormal.      Electronically signed by: Chris Pérez MD  Date:    08/09/2022  Time:    18:23             Narrative:    EXAMINATION:  CT ABDOMEN PELVIS WITH CONTRAST    CLINICAL HISTORY:  RLQ abdominal pain (Age >= 14y);    TECHNIQUE:  Low dose axial images, sagittal and coronal reformations were obtained from the lung bases to the pubic symphysis following the IV administration of 100 mL of Omnipaque 350 .  Oral contrast was not given.    COMPARISON:  Pelvic ultrasound 07/20/2022 and right upper quadrant sound 05/29/2014    FINDINGS:  Superior most aspect of the hepatic dome not included in field of view limiting evaluation of the liver.    Liver is otherwise normal in size without focal lesion seen of the imaged portions.  Main portal vein appears patent.    Gallbladder, pancreas, spleen, stomach, duodenum and bilateral adrenal glands are within normal limits.  No biliary ductal  dilatation.    Bilateral kidneys are normal in size, shape and location.  There is moderate right-sided hydronephrosis secondary to a 1.5 cm calculus seen at the ureteropelvic junction (UPJ).  There is slight delayed enhancement of the right kidney with asymmetric mild right perinephric stranding, presumably related to obstructive uropathy.  There is mild stranding about the proximal right ureter.  No left hydronephrosis.  Left ureter is normal in course and caliber.  Left kidney enhances normally.  No significant left perinephric stranding.  No radiodense calculus seen within the left collecting system or urinary bladder.  Urinary bladder is suboptimally distended.  Uterus is anteflexed.  A few small follicles noted at each adnexa.  There is trace volume nonspecific free pelvic fluid, commonly physiologic in this age group.  Pelvic phleboliths noted.    Appendix and terminal ileum are within normal limits.  No evidence of bowel obstruction or acute inflammation.  No pneumatosis or portal venous gas.    No ascites, free air or lymphadenopathy.  No significant atherosclerosis.  No aortic aneurysm or dissection.    Tiny fat containing umbilical hernia.    Osseous structures show age-related minimal to mild degenerative change and few subcentimeter sclerotic foci at the bilateral femoral heads suggestive of bone islands, without acute or destructive process seen.                                 Medications   iohexoL (OMNIPAQUE 350) injection 100 mL (100 mLs Intravenous Given 8/9/22 1745)   tamsulosin 24 hr capsule 0.4 mg (0.4 mg Oral Given 8/9/22 1908)   sodium chloride 0.9% bolus 1,000 mL (0 mLs Intravenous Stopped 8/9/22 2017)   ketorolac injection 9.999 mg (9.999 mg Intravenous Given 8/9/22 1907)     Medical Decision Making:   Initial Assessment:   41 yo female, here c/o intermittent RLQ pain with right low back pain off and on for several weeks, now worse  Differential Diagnosis:   Appendicitis, Dysmenorrhea, Renal  lithiasis, etc   Clinical Tests:   Lab Tests: Ordered and Reviewed  Radiological Study: Ordered and Reviewed  ED Management:  Vital signs reviewed   Records reviewed   UA with hematuria - no infection   CT shows 1.5 cm stone at right UPJ   Pt treated with IV fluids, Toradol, and PO Flomax   Urology consult ordered   Urology evaluated patient - requests that she be discharged home with Rx for Toradol, Flomax, and Norco - close outpatient follow up arranged   Pt advised to return to the ER if unimproved or if worse in any way                         Clinical Impression:   Final diagnoses:  [N20.0] Kidney stone on right side (Primary)  [N13.9] Acute unilateral obstructive uropathy          ED Disposition Condition    Discharge Stable        ED Prescriptions     Medication Sig Dispense Start Date End Date Auth. Provider    tamsulosin (FLOMAX) 0.4 mg Cap Take 1 capsule (0.4 mg total) by mouth once daily. for 14 days 14 capsule 8/9/2022 8/23/2022 Paola Kevin PA-C    ketorolac (TORADOL) 10 mg tablet Take 1 tablet (10 mg total) by mouth 2 (two) times daily as needed for Pain. 14 tablet 8/9/2022 8/16/2022 Paola Kevin PA-C    HYDROcodone-acetaminophen (NORCO)  mg per tablet Take 1 tablet by mouth every 6 (six) hours as needed for Pain. 10 tablet 8/9/2022 8/12/2022 Paola Kevin PA-C    ondansetron (ZOFRAN-ODT) 4 MG TbDL Take 1 tablet (4 mg total) by mouth every 6 (six) hours as needed (nausea). One tablet sublingual tid prn nausea 10 tablet 8/9/2022  Paola Kevin PA-C        Follow-up Information     Follow up With Specialties Details Why Contact Info Additional Information    Lida Garcia MD Internal Medicine Schedule an appointment as soon as possible for a visit in 2 days  [address]  [facility] [address]  [phone]       [facility] [address] Urology Schedule an appointment as soon as possible for a visit  Follow up with [facility] at next  available [address]  [facility] [id number] [phone] Main Building, 4th Floor Please park in [address] and take Atrium elevator    [facility] - Emergency Dept Emergency Medicine  If symptoms worsen in any way or if unimproved [id number]  [facility] [id number] [phone]            Omer Kevin PA-C  08/09/22 1838       Omer Kevin PA-C  08/09/22 1903       Omer Kevin PA-C  08/09/22 2046

## 2022-08-09 NOTE — ED NOTES
Appearance:  Pt awake, alert & oriented to person, place & time.   Skin:  Skin warm, dry & intact.  Mucous membranes moist.  Skin turgor normal.  Respiratory:  Respirations even, non-labored.    Neurologic:  Pt moving all extremities without difficulty.  Sensation intact.     Peripheral Vascular:  All peripheral pulses present.  Abdomen:  Abdomen soft.  Tenderness to RLQ

## 2022-08-09 NOTE — FIRST PROVIDER EVALUATION
"Medical screening exam completed.  I have conducted a focused provider triage encounter, findings are as follows:    Brief history of present illness:  41 yo F w/ RLQ pain. Worse over last 2 days. Nausea and vomiting. H/o recent unremarkable TVUS w/ OBGYN    Vitals:    08/09/22 1525   BP: (!) 158/57   Pulse: 97   Resp: 18   Temp: 98.3 °F (36.8 °C)   TempSrc: Oral   SpO2: 98%   Weight: 90.7 kg (200 lb)   Height: 5' 7" (1.702 m)       Pertinent physical exam:  TTP RLQ    Brief workup plan:  Initiate lab work anticipating imaging for appendicitis.    Preliminary workup initiated; this workup will be continued and followed by the physician or advanced practice provider that is assigned to the patient when roomed.  "

## 2022-08-09 NOTE — PROGRESS NOTES
"Subjective:       Patient ID: Josefina Mark is a 42 y.o. female.    Vitals:  height is 5' 7" (1.702 m) and weight is 85.7 kg (189 lb). Her oral temperature is 98.6 °F (37 °C). Her blood pressure is 144/92 (abnormal) and her pulse is 76. Her respiration is 18 and oxygen saturation is 98%.     Chief Complaint: Groin Pain    RLQ abd pain -pt states she's been having this pain for months along with nausea. Pt thinks it's her appendix  Pt had an u/s pelvic -July 20, 2022 w/ [facility] pertaining to this issue.     Pt has been taking naproxen for pain    Provider note:  Patient presents with concern for RLQ abdominal/pelvic pain that is coming and going.  Cramping type sensation, radiates to right lower back.  Notes that she's had this for months and usually comes when her period starts.  She just started her menstrual cycle.  Her 3 year old at home has N/V as well coming home from the beach and thinks that she has a stomach bug.  Denies fever.  She does have decreased appetite associated.  Denies a history of abdominal surgeries.  Her OB ordered an US for this July 20,2022 and is did not show any masses, fibroids, or ovarian cysts.  She tried Naproxen at home with no relief.  Denies a history of GI issues and denies diarrhea or constipation.    Groin Pain  The patient's primary symptoms include pelvic pain. The patient's pertinent negatives include no genital itching, genital lesions, genital odor, genital rash, missed menses, vaginal bleeding or vaginal discharge. This is a recurrent problem. The current episode started more than 1 month ago. The problem occurs constantly. The problem has been rapidly worsening. The pain is severe. The problem affects the right side. Associated symptoms include abdominal pain, back pain, nausea and vomiting. Pertinent negatives include no anorexia, chills, constipation, diarrhea, discolored urine, dysuria, fever, flank pain, frequency, headaches, hematuria, joint pain, joint " swelling, painful intercourse, rash, sore throat or urgency. Nothing aggravates the symptoms. She has tried acetaminophen for the symptoms. The treatment provided no relief. She is sexually active. No, her partner does not have an STD. She uses nothing for contraception. Her menstrual history has been regular. There is no history of an abdominal surgery or ovarian cysts.       Constitution: Positive for appetite change. Negative for chills, sweating, fatigue and fever.   HENT: Negative for sore throat.    Gastrointestinal: Positive for abdominal pain, nausea and vomiting. Negative for history of abdominal surgery, constipation, diarrhea, bright red blood in stool, dark colored stools and rectal bleeding.   Genitourinary: Positive for pelvic pain. Negative for dysuria, frequency, urgency, flank pain, hematuria, missed menses, ovarian cysts, vaginal discharge and vaginal odor.   Musculoskeletal: Positive for back pain.   Skin: Negative for rash.   Neurological: Negative for headaches.       Objective:      Physical Exam   Constitutional: She is oriented to person, place, and time. She appears well-developed.  Non-toxic appearance. She does not appear ill. No distress.      Comments:Patient tearful during exam     HENT:   Head: Normocephalic and atraumatic.   Ears:   Right Ear: External ear normal.   Left Ear: External ear normal.   Nose: Nose normal.   Mouth/Throat: Mucous membranes are normal.   Eyes: Conjunctivae and lids are normal.   Neck: Trachea normal. Neck supple.   Cardiovascular: Normal rate, regular rhythm and normal heart sounds.   Pulmonary/Chest: Effort normal and breath sounds normal. No respiratory distress.   Abdominal: Normal appearance and bowel sounds are normal. She exhibits no distension, no abdominal bruit, no pulsatile midline mass and no mass. Soft. flat abdomen There is no abdominal tenderness. There is no rebound, no guarding, no tenderness at McBurney's point, no left CVA tenderness and no  right CVA tenderness. No hernia.     Musculoskeletal: Normal range of motion.         General: Normal range of motion.   Neurological: She is alert and oriented to person, place, and time. She has normal strength.   Skin: Skin is warm, dry, intact, not diaphoretic and not pale.   Psychiatric: Her speech is normal and behavior is normal. Judgment and thought content normal.   Nursing note and vitals reviewed.    Results for orders placed or performed in visit on 08/09/22   POCT Urinalysis, Dipstick, Automated, W/O Scope   Result Value Ref Range    POC Blood, Urine Positive (A) Negative    POC Bilirubin, Urine Negative Negative    POC Urobilinogen, Urine normal 0.1 - 1.1    POC Ketones, Urine Negative Negative    POC Protein, Urine Positive (A) Negative    POC Nitrates, Urine Negative Negative    POC Glucose, Urine Positive (A) Negative    pH, UA 5.0 5 - 8    POC Specific Gravity, Urine 1.020 1.003 - 1.029    POC Leukocytes, Urine Negative Negative           Assessment:       1. Pelvic pain    2. Abdominal pain, unspecified abdominal location    3. Right-sided back pain, unspecified back location, unspecified chronicity    4. Nausea and vomiting, intractability of vomiting not specified, unspecified vomiting type          Plan:       UA shows protein, blood, and glucose.  Hx of blood in her urine in the past and recently started her menstrual cycle.  Will send urine off for culture.  She is not having symptoms or a UTI or kidney stone.    Patient's concern for Appendicitis, offered reassurance that her exam and HPI is not consistent with appendicitis however we can not rule this out in this setting.  She has had pain for months that comes and goes.  She does not have Mcburney point tenderness and her pain is lower and more pelvic.  Her OB ordered an US for this and it did not show any masses, fibroids, or ovarian cysts.  Denies a history of IBS, though this is in consideration.  Will send off Bentyl for pain control  and patient was instructed to f/u with GI if this persists.  Strict ER precautions given for any worsening pain, concern, and/or if she develops fever.  Offered patient to go to the ER s/t concern however she does not want to do this at this time and will wait to hear back from her OB.  Pelvic pain  -     POCT Urinalysis, Dipstick, Automated, W/O Scope  -     Urine culture    Abdominal pain, unspecified abdominal location  -     dicyclomine (BENTYL) 20 mg tablet; Take 1 tablet (20 mg total) by mouth every 6 (six) hours.  Dispense: 120 tablet; Refill: 0  -     Urine culture    Right-sided back pain, unspecified back location, unspecified chronicity    Nausea and vomiting, intractability of vomiting not specified, unspecified vomiting type  -     ondansetron (ZOFRAN-ODT) 4 MG TbDL; One tablet sublingual tid prn nausea  Dispense: 10 tablet; Refill: 0      Patient Instructions   Urine culture pending, we will call you with these results in 3-5 days.    If your condition worsens or fails to improve we recommend that you receive another evaluation at the ER immediately or contact your PCP to discuss your concerns or return here. You must understand that you've received an urgent care treatment only and that you may be released before all your medical problems are known or treated. You the patient will arrange for followup care as instructed.   Zofran as needed for nausea or vomiting.  Bentyl as needed for abdominal pain or cramping.   Maintain hydration by drinking small amounts of clear fluids frequently, then soft diet, and then advance diet as tolerated. May use OTC Imodium OR Pepto Bismol if desired for any diarrhea.    Watch for any increase pain, fever, localized pain to right lower abdomen or continued vomiting or diarrhea.

## 2022-08-09 NOTE — ED TRIAGE NOTES
Pt c/o RLQ pain x a couple months-intermittently.  States this episode began today.  States she is only nauseated today but reports vomiting yesterday.  Pt states MD sent her here for CT to r/o appendicitis.  Denies urinary symptoms.

## 2022-08-10 ENCOUNTER — PATIENT MESSAGE (OUTPATIENT)
Dept: UROLOGY | Facility: CLINIC | Age: 42
End: 2022-08-10
Payer: COMMERCIAL

## 2022-08-10 LAB
BACTERIA UR CULT: NO GROWTH
HCV AB SERPL QL IA: NEGATIVE
HIV 1+2 AB+HIV1 P24 AG SERPL QL IA: NEGATIVE

## 2022-08-10 NOTE — ASSESSMENT & PLAN NOTE
42-year-old female with above comorbidities with 1.5 cm right UPJ kidney stone.    -no urgent urological intervention at this time  -okay to discharge from the emergency department  -recommend sending with ketorolac, Flomax, and Percocet  -strain all urine.  Please send with urine strainer  -Will arrange for short interval follow-up in Urology Clinic to discuss stone management

## 2022-08-10 NOTE — H&P (VIEW-ONLY)
[facility] - Emergency Dept  Urology  Consult Note    Patient Name: Josefina Mark  MRN: [id number]  Admission Date: 8/9/2022  Hospital Length of Stay: 0   Code Status: Prior   Attending Provider: No att. providers found   Consulting Provider: Sushant Rinaldi MD  Primary Care Physician: Lida Garcia MD  Principal Problem:<principal problem not specified>    Inpatient consult to Urology  Consult performed by: Sushant Rinaldi MD  Consult ordered by: Omer Kevin PA-C  Reason for consult: R UPJ stone          Subjective:     HPI:  42-year-old female with diabetes, hypothyroidism who presented to the ED for severe right lower quadrant and flank pain.    Urology was consulted for 1.5 cm right UPJ stone. Patient has had waxing and waning right sided pain for multiple months which acutely worsening 8/9/22 AM. Patient has been afebrile, no leukocytosis, no FANNIE.  Patient had nausea without vomiting, no fevers or chills at home. Denies dysuria, hematuria, frequency or changes in urination.    Pain has been well controlled in the ED with ketorolac. Urinalysis non concerning for infection.  Urine culture sent by ED.    WBC 8.54 Hgb 13 Cr 0.9  UA: >100 RBC, 10 WBC, no bacteria/yeast, nitrite neg, no squams    CT 8/9/22: 1.5 cm right upj stone with hydronephrosis. No other urolithiasis. Bilateral ureters normal in caliber.       Past Medical History:   Diagnosis Date    Diabetes mellitus, type 2     Fever blister     Hypothyroidism        History reviewed. No pertinent surgical history.    Review of patient's allergies indicates:  No Known Allergies    Family History       Problem Relation (Age of Onset)    Breast cancer Maternal Aunt (72), Maternal Grandmother, Mother (68), Maternal Aunt (49)    Cancer Father, Paternal Grandfather, Son (16), Maternal Uncle, Maternal Cousin, Paternal Aunt (65), Paternal Aunt, Paternal Aunt (68)    Diabetes Paternal Grandmother    Down syndrome Maternal Uncle    Early  death Paternal Uncle    Glaucoma Paternal Grandmother    Heart failure Paternal Grandmother    Hypertension Paternal Grandmother    Kidney failure Maternal Aunt            Tobacco Use    Smoking status: Never Smoker    Smokeless tobacco: Never Used   Substance and Sexual Activity    Alcohol use: No    Drug use: No    Sexual activity: Yes     Partners: Male     Birth control/protection: Partner-Vasectomy       Review of Systems   Constitutional:  Negative for activity change, chills, diaphoresis and fever.   HENT: Negative.     Eyes: Negative.    Respiratory: Negative.  Negative for shortness of breath.    Cardiovascular: Negative.  Negative for chest pain.   Gastrointestinal:  Positive for abdominal pain and nausea. Negative for abdominal distention, constipation, diarrhea and vomiting.   Genitourinary:  Positive for flank pain. Negative for difficulty urinating, dysuria, frequency, hematuria, nocturia and urgency.   Skin: Negative.    Neurological: Negative.    Psychiatric/Behavioral: Negative.       Objective:     Temp:  [98.3 °F (36.8 °C)-98.6 °F (37 °C)] 98.5 °F (36.9 °C)  Pulse:  [71-97] 71  Resp:  [18] 18  SpO2:  [97 %-98 %] 97 %  BP: (104-158)/(57-92) 104/79     Body mass index is 31.32 kg/m².    Date 08/09/22 0700 - 08/10/22 0659   Shift 3125-8262 1715-5265 2465-1909 24 Hour Total   INTAKE   IV Piggyback  1000  1000   Shift Total(mL/kg)  1000(11)  1000(11)   OUTPUT   Shift Total(mL/kg)       Weight (kg)  90.7 90.7 90.7          Drains       None                   Physical Exam  Vitals and nursing note reviewed.   Constitutional:       General: She is not in acute distress.  HENT:      Head: Atraumatic.      Nose: Nose normal.   Eyes:      Extraocular Movements: Extraocular movements intact.   Cardiovascular:      Rate and Rhythm: Normal rate.   Pulmonary:      Effort: Pulmonary effort is normal.   Abdominal:      General: Abdomen is flat. There is no distension.      Tenderness: There is no abdominal  tenderness. There is no right CVA tenderness or left CVA tenderness.   Musculoskeletal:         General: Normal range of motion.      Cervical back: Normal range of motion.   Neurological:      General: No focal deficit present.      Mental Status: She is alert and oriented to person, place, and time.   Psychiatric:         Mood and Affect: Mood normal.         Behavior: Behavior normal.       Significant Labs:    BMP:  Recent Labs   Lab 08/09/22  1631      K 4.0      CO2 24   BUN 11   CREATININE 0.9   CALCIUM 9.4       CBC:  Recent Labs   Lab 08/09/22  1631   WBC 8.54   HGB 13.0   HCT 38.2          Urine Studies:   Recent Labs   Lab 08/09/22  1236 08/09/22  1707   COLORU  --  Yellow   APPEARANCEUA  --  Clear   PHUR 5.0 6.0   SPECGRAV  --  >1.030*   PROTEINUA  --  1+*   GLUCUA  --  4+*   KETONESU  --  2+*   BILIRUBINUA  --  Negative   OCCULTUA  --  3+*   NITRITE  --  Negative   LEUKOCYTESUR  --  1+*   RBCUA  --  >100*   WBCUA  --  10*   BACTERIA  --  None   HYALINECASTS  --  0       Significant Imaging:  CT: I have reviewed all results within the past 24 hours and my personal findings are:  As per HPI.        Assessment and Plan:     Right kidney stone  42-year-old female with above comorbidities with 1.5 cm right UPJ kidney stone.    -no urgent urological intervention at this time  -okay to discharge from the emergency department  -recommend increasing fluid intake  -recommend sending with ketorolac, Flomax, and Percocet  -strain all urine.  Please send with urine strainer  -Will arrange for short interval follow-up in Urology Clinic to discuss stone management        VTE Risk Mitigation (From admission, onward)    None          Thank you for your consult. I will sign off. Please contact us if you have any additional questions.    Sushant Rinaldi MD  Urology  [facility] - Emergency Dept

## 2022-08-10 NOTE — SUBJECTIVE & OBJECTIVE
Past Medical History:   Diagnosis Date    Diabetes mellitus, type 2     Fever blister     Hypothyroidism        History reviewed. No pertinent surgical history.    Review of patient's allergies indicates:  No Known Allergies    Family History       Problem Relation (Age of Onset)    Breast cancer Maternal Aunt (72), Maternal Grandmother, Mother (68), Maternal Aunt (49)    Cancer Father, Paternal Grandfather, Son (16), Maternal Uncle, Maternal Cousin, Paternal Aunt (65), Paternal Aunt, Paternal Aunt (68)    Diabetes Paternal Grandmother    Down syndrome Maternal Uncle    Early death Paternal Uncle    Glaucoma Paternal Grandmother    Heart failure Paternal Grandmother    Hypertension Paternal Grandmother    Kidney failure Maternal Aunt            Tobacco Use    Smoking status: Never Smoker    Smokeless tobacco: Never Used   Substance and Sexual Activity    Alcohol use: No    Drug use: No    Sexual activity: Yes     Partners: Male     Birth control/protection: Partner-Vasectomy       Review of Systems   Constitutional:  Negative for activity change, chills, diaphoresis and fever.   HENT: Negative.     Eyes: Negative.    Respiratory: Negative.  Negative for shortness of breath.    Cardiovascular: Negative.  Negative for chest pain.   Gastrointestinal:  Positive for abdominal pain and nausea. Negative for abdominal distention, constipation, diarrhea and vomiting.   Genitourinary:  Positive for flank pain. Negative for difficulty urinating, dysuria, frequency, hematuria, nocturia and urgency.   Skin: Negative.    Neurological: Negative.    Psychiatric/Behavioral: Negative.       Objective:     Temp:  [98.3 °F (36.8 °C)-98.6 °F (37 °C)] 98.5 °F (36.9 °C)  Pulse:  [71-97] 71  Resp:  [18] 18  SpO2:  [97 %-98 %] 97 %  BP: (104-158)/(57-92) 104/79     Body mass index is 31.32 kg/m².    Date 08/09/22 0700 - 08/10/22 0659   Shift 6023-5934 5921-4733 3232-7017 24 Hour Total   INTAKE   IV Piggyback  1000  1000   Shift Total(mL/kg)   1000(11)  1000(11)   OUTPUT   Shift Total(mL/kg)       Weight (kg)  90.7 90.7 90.7          Drains       None                   Physical Exam  Vitals and nursing note reviewed.   Constitutional:       General: She is not in acute distress.  HENT:      Head: Atraumatic.      Nose: Nose normal.   Eyes:      Extraocular Movements: Extraocular movements intact.   Cardiovascular:      Rate and Rhythm: Normal rate.   Pulmonary:      Effort: Pulmonary effort is normal.   Abdominal:      General: Abdomen is flat. There is no distension.      Tenderness: There is no abdominal tenderness. There is no right CVA tenderness or left CVA tenderness.   Musculoskeletal:         General: Normal range of motion.      Cervical back: Normal range of motion.   Neurological:      General: No focal deficit present.      Mental Status: She is alert and oriented to person, place, and time.   Psychiatric:         Mood and Affect: Mood normal.         Behavior: Behavior normal.       Significant Labs:    BMP:  Recent Labs   Lab 08/09/22  1631      K 4.0      CO2 24   BUN 11   CREATININE 0.9   CALCIUM 9.4       CBC:  Recent Labs   Lab 08/09/22  1631   WBC 8.54   HGB 13.0   HCT 38.2          Urine Studies:   Recent Labs   Lab 08/09/22  1236 08/09/22  1707   COLORU  --  Yellow   APPEARANCEUA  --  Clear   PHUR 5.0 6.0   SPECGRAV  --  >1.030*   PROTEINUA  --  1+*   GLUCUA  --  4+*   KETONESU  --  2+*   BILIRUBINUA  --  Negative   OCCULTUA  --  3+*   NITRITE  --  Negative   LEUKOCYTESUR  --  1+*   RBCUA  --  >100*   WBCUA  --  10*   BACTERIA  --  None   HYALINECASTS  --  0       Significant Imaging:  CT: I have reviewed all results within the past 24 hours and my personal findings are:  As per HPI.

## 2022-08-10 NOTE — CONSULTS
[facility] - Emergency Dept  Urology  Consult Note    Patient Name: Josefina Mark  MRN: [id number]  Admission Date: 8/9/2022  Hospital Length of Stay: 0   Code Status: Prior   Attending Provider: No att. providers found   Consulting Provider: Sushant Rinaldi MD  Primary Care Physician: Lida Garcia MD  Principal Problem:<principal problem not specified>    Inpatient consult to Urology  Consult performed by: Sushant Rinaldi MD  Consult ordered by: Omer Kevin PA-C  Reason for consult: R UPJ stone          Subjective:     HPI:  42-year-old female with diabetes, hypothyroidism who presented to the ED for severe right lower quadrant and flank pain.    Urology was consulted for 1.5 cm right UPJ stone. Patient has had waxing and waning right sided pain for multiple months which acutely worsening 8/9/22 AM. Patient has been afebrile, no leukocytosis, no FANNIE.  Patient had nausea without vomiting, no fevers or chills at home. Denies dysuria, hematuria, frequency or changes in urination.    Pain has been well controlled in the ED with ketorolac. Urinalysis non concerning for infection.  Urine culture sent by ED.    WBC 8.54 Hgb 13 Cr 0.9  UA: >100 RBC, 10 WBC, no bacteria/yeast, nitrite neg, no squams    CT 8/9/22: 1.5 cm right upj stone with hydronephrosis. No other urolithiasis. Bilateral ureters normal in caliber.       Past Medical History:   Diagnosis Date    Diabetes mellitus, type 2     Fever blister     Hypothyroidism        History reviewed. No pertinent surgical history.    Review of patient's allergies indicates:  No Known Allergies    Family History       Problem Relation (Age of Onset)    Breast cancer Maternal Aunt (72), Maternal Grandmother, Mother (68), Maternal Aunt (49)    Cancer Father, Paternal Grandfather, Son (16), Maternal Uncle, Maternal Cousin, Paternal Aunt (65), Paternal Aunt, Paternal Aunt (68)    Diabetes Paternal Grandmother    Down syndrome Maternal Uncle    Early  death Paternal Uncle    Glaucoma Paternal Grandmother    Heart failure Paternal Grandmother    Hypertension Paternal Grandmother    Kidney failure Maternal Aunt            Tobacco Use    Smoking status: Never Smoker    Smokeless tobacco: Never Used   Substance and Sexual Activity    Alcohol use: No    Drug use: No    Sexual activity: Yes     Partners: Male     Birth control/protection: Partner-Vasectomy       Review of Systems   Constitutional:  Negative for activity change, chills, diaphoresis and fever.   HENT: Negative.     Eyes: Negative.    Respiratory: Negative.  Negative for shortness of breath.    Cardiovascular: Negative.  Negative for chest pain.   Gastrointestinal:  Positive for abdominal pain and nausea. Negative for abdominal distention, constipation, diarrhea and vomiting.   Genitourinary:  Positive for flank pain. Negative for difficulty urinating, dysuria, frequency, hematuria, nocturia and urgency.   Skin: Negative.    Neurological: Negative.    Psychiatric/Behavioral: Negative.       Objective:     Temp:  [98.3 °F (36.8 °C)-98.6 °F (37 °C)] 98.5 °F (36.9 °C)  Pulse:  [71-97] 71  Resp:  [18] 18  SpO2:  [97 %-98 %] 97 %  BP: (104-158)/(57-92) 104/79     Body mass index is 31.32 kg/m².    Date 08/09/22 0700 - 08/10/22 0659   Shift 3603-7753 4842-3695 3575-7642 24 Hour Total   INTAKE   IV Piggyback  1000  1000   Shift Total(mL/kg)  1000(11)  1000(11)   OUTPUT   Shift Total(mL/kg)       Weight (kg)  90.7 90.7 90.7          Drains       None                   Physical Exam  Vitals and nursing note reviewed.   Constitutional:       General: She is not in acute distress.  HENT:      Head: Atraumatic.      Nose: Nose normal.   Eyes:      Extraocular Movements: Extraocular movements intact.   Cardiovascular:      Rate and Rhythm: Normal rate.   Pulmonary:      Effort: Pulmonary effort is normal.   Abdominal:      General: Abdomen is flat. There is no distension.      Tenderness: There is no abdominal  tenderness. There is no right CVA tenderness or left CVA tenderness.   Musculoskeletal:         General: Normal range of motion.      Cervical back: Normal range of motion.   Neurological:      General: No focal deficit present.      Mental Status: She is alert and oriented to person, place, and time.   Psychiatric:         Mood and Affect: Mood normal.         Behavior: Behavior normal.       Significant Labs:    BMP:  Recent Labs   Lab 08/09/22  1631      K 4.0      CO2 24   BUN 11   CREATININE 0.9   CALCIUM 9.4       CBC:  Recent Labs   Lab 08/09/22  1631   WBC 8.54   HGB 13.0   HCT 38.2          Urine Studies:   Recent Labs   Lab 08/09/22  1236 08/09/22  1707   COLORU  --  Yellow   APPEARANCEUA  --  Clear   PHUR 5.0 6.0   SPECGRAV  --  >1.030*   PROTEINUA  --  1+*   GLUCUA  --  4+*   KETONESU  --  2+*   BILIRUBINUA  --  Negative   OCCULTUA  --  3+*   NITRITE  --  Negative   LEUKOCYTESUR  --  1+*   RBCUA  --  >100*   WBCUA  --  10*   BACTERIA  --  None   HYALINECASTS  --  0       Significant Imaging:  CT: I have reviewed all results within the past 24 hours and my personal findings are:  As per HPI.        Assessment and Plan:     Right kidney stone  42-year-old female with above comorbidities with 1.5 cm right UPJ kidney stone.    -no urgent urological intervention at this time  -okay to discharge from the emergency department  -recommend increasing fluid intake  -recommend sending with ketorolac, Flomax, and Percocet  -strain all urine.  Please send with urine strainer  -Will arrange for short interval follow-up in Urology Clinic to discuss stone management        VTE Risk Mitigation (From admission, onward)    None          Thank you for your consult. I will sign off. Please contact us if you have any additional questions.    Sushant Rinaldi MD  Urology  [facility] - Emergency Dept

## 2022-08-10 NOTE — HPI
42-year-old female with diabetes, hypothyroidism who presented to the ED for severe right lower quadrant and flank pain.    Urology was consulted for 1.5 cm right UPJ stone. Patient has had waxing and waning right sided pain for multiple months which acutely worsening 8/9/22 AM. Patient has been afebrile, no leukocytosis, no FANNIE.  Patient had nausea without vomiting, no fevers or chills at home. Denies dysuria, hematuria, frequency or changes in urination.    Pain has been well controlled in the ED with ketorolac. Urinalysis non concerning for infection.  Urine culture sent by ED.    WBC 8.54 Hgb 13 Cr 0.9  UA: >100 RBC, 10 WBC, no bacteria/yeast, nitrite neg, no squams    CT 8/9/22: 1.5 cm right upj stone with hydronephrosis. No other urolithiasis. Bilateral ureters normal in caliber.

## 2022-08-11 ENCOUNTER — TELEPHONE (OUTPATIENT)
Dept: UROLOGY | Facility: CLINIC | Age: 42
End: 2022-08-11
Payer: COMMERCIAL

## 2022-08-11 ENCOUNTER — PATIENT MESSAGE (OUTPATIENT)
Dept: UROLOGY | Facility: CLINIC | Age: 42
End: 2022-08-11

## 2022-08-11 ENCOUNTER — HOSPITAL ENCOUNTER (OUTPATIENT)
Dept: RADIOLOGY | Facility: HOSPITAL | Age: 42
Discharge: HOME | End: 2022-08-11
Attending: NURSE PRACTITIONER
Payer: COMMERCIAL

## 2022-08-11 DIAGNOSIS — N20.1 OBSTRUCTION OF RIGHT URETEROPELVIC JUNCTION (UPJ) DUE TO STONE: ICD-10-CM

## 2022-08-11 DIAGNOSIS — N20.1 OBSTRUCTION OF RIGHT URETEROPELVIC JUNCTION (UPJ) DUE TO STONE: Primary | ICD-10-CM

## 2022-08-11 PROCEDURE — 74018 RADEX ABDOMEN 1 VIEW: CPT | Mod: 26,,, | Performed by: RADIOLOGY

## 2022-08-11 PROCEDURE — 74018 RADEX ABDOMEN 1 VIEW: CPT | Mod: TC

## 2022-08-11 PROCEDURE — 74018 XR ABDOMEN AP 1 VIEW: ICD-10-PCS | Mod: 26,,, | Performed by: RADIOLOGY

## 2022-08-12 ENCOUNTER — ANESTHESIA (OUTPATIENT)
Dept: SURGERY | Facility: HOSPITAL | Age: 42
End: 2022-08-12
Payer: COMMERCIAL

## 2022-08-12 ENCOUNTER — HOSPITAL ENCOUNTER (OUTPATIENT)
Facility: HOSPITAL | Age: 42
Discharge: HOME | End: 2022-08-12
Attending: UROLOGY | Admitting: UROLOGY
Payer: COMMERCIAL

## 2022-08-12 ENCOUNTER — ANESTHESIA EVENT (OUTPATIENT)
Dept: SURGERY | Facility: HOSPITAL | Age: 42
End: 2022-08-12
Payer: COMMERCIAL

## 2022-08-12 VITALS
BODY MASS INDEX: 29.82 KG/M2 | HEIGHT: 67 IN | DIASTOLIC BLOOD PRESSURE: 72 MMHG | RESPIRATION RATE: 18 BRPM | SYSTOLIC BLOOD PRESSURE: 126 MMHG | HEART RATE: 64 BPM | OXYGEN SATURATION: 99 % | TEMPERATURE: 98 F | WEIGHT: 190 LBS

## 2022-08-12 DIAGNOSIS — N20.0 NEPHROLITHIASIS: ICD-10-CM

## 2022-08-12 DIAGNOSIS — N20.0 RIGHT KIDNEY STONE: ICD-10-CM

## 2022-08-12 DIAGNOSIS — N20.1 URETERAL STONE: Primary | ICD-10-CM

## 2022-08-12 LAB
POCT GLUCOSE: 114 MG/DL (ref 70–110)
POCT GLUCOSE: 119 MG/DL (ref 70–110)

## 2022-08-12 PROCEDURE — 52356 CYSTO/URETERO W/LITHOTRIPSY: CPT | Mod: RT,,, | Performed by: UROLOGY

## 2022-08-12 PROCEDURE — C1758 CATHETER, URETERAL: HCPCS | Performed by: UROLOGY

## 2022-08-12 PROCEDURE — 36000707: Performed by: UROLOGY

## 2022-08-12 PROCEDURE — D9220A PRA ANESTHESIA: Mod: CRNA,,, | Performed by: NURSE ANESTHETIST, CERTIFIED REGISTERED

## 2022-08-12 PROCEDURE — 71000015 HC POSTOP RECOV 1ST HR: Performed by: UROLOGY

## 2022-08-12 PROCEDURE — 25000003 PHARM REV CODE 250: Performed by: NURSE ANESTHETIST, CERTIFIED REGISTERED

## 2022-08-12 PROCEDURE — C1894 INTRO/SHEATH, NON-LASER: HCPCS | Performed by: UROLOGY

## 2022-08-12 PROCEDURE — 25000003 PHARM REV CODE 250: Performed by: STUDENT IN AN ORGANIZED HEALTH CARE EDUCATION/TRAINING PROGRAM

## 2022-08-12 PROCEDURE — D9220A PRA ANESTHESIA: ICD-10-PCS | Mod: CRNA,,, | Performed by: NURSE ANESTHETIST, CERTIFIED REGISTERED

## 2022-08-12 PROCEDURE — C2617 STENT, NON-COR, TEM W/O DEL: HCPCS | Performed by: UROLOGY

## 2022-08-12 PROCEDURE — 82962 GLUCOSE BLOOD TEST: CPT | Performed by: UROLOGY

## 2022-08-12 PROCEDURE — 63600175 PHARM REV CODE 636 W HCPCS: Performed by: NURSE ANESTHETIST, CERTIFIED REGISTERED

## 2022-08-12 PROCEDURE — 37000009 HC ANESTHESIA EA ADD 15 MINS: Performed by: UROLOGY

## 2022-08-12 PROCEDURE — 71000044 HC DOSC ROUTINE RECOVERY FIRST HOUR: Performed by: UROLOGY

## 2022-08-12 PROCEDURE — 36000706: Performed by: UROLOGY

## 2022-08-12 PROCEDURE — 52356 PR CYSTO/URETERO W/LITHOTRIPSY: ICD-10-PCS | Mod: RT,,, | Performed by: UROLOGY

## 2022-08-12 PROCEDURE — D9220A PRA ANESTHESIA: ICD-10-PCS | Mod: ANES,,, | Performed by: ANESTHESIOLOGY

## 2022-08-12 PROCEDURE — 27201423 OPTIME MED/SURG SUP & DEVICES STERILE SUPPLY: Performed by: UROLOGY

## 2022-08-12 PROCEDURE — 82365 CALCULUS SPECTROSCOPY: CPT | Performed by: UROLOGY

## 2022-08-12 PROCEDURE — 37000008 HC ANESTHESIA 1ST 15 MINUTES: Performed by: UROLOGY

## 2022-08-12 PROCEDURE — D9220A PRA ANESTHESIA: Mod: ANES,,, | Performed by: ANESTHESIOLOGY

## 2022-08-12 PROCEDURE — C1769 GUIDE WIRE: HCPCS | Performed by: UROLOGY

## 2022-08-12 DEVICE — STENT URETERAL UNIV 6FR 24CM: Type: IMPLANTABLE DEVICE | Site: URETER | Status: FUNCTIONAL

## 2022-08-12 RX ORDER — ONDANSETRON 2 MG/ML
INJECTION INTRAMUSCULAR; INTRAVENOUS
Status: DISCONTINUED | OUTPATIENT
Start: 2022-08-12 | End: 2022-08-12

## 2022-08-12 RX ORDER — PROCHLORPERAZINE EDISYLATE 5 MG/ML
5 INJECTION INTRAMUSCULAR; INTRAVENOUS EVERY 30 MIN PRN
Status: DISCONTINUED | OUTPATIENT
Start: 2022-08-12 | End: 2022-08-12 | Stop reason: HOSPADM

## 2022-08-12 RX ORDER — ONDANSETRON 2 MG/ML
4 INJECTION INTRAMUSCULAR; INTRAVENOUS DAILY PRN
Status: DISCONTINUED | OUTPATIENT
Start: 2022-08-12 | End: 2022-08-12 | Stop reason: HOSPADM

## 2022-08-12 RX ORDER — FENTANYL CITRATE 50 UG/ML
25 INJECTION, SOLUTION INTRAMUSCULAR; INTRAVENOUS EVERY 5 MIN PRN
Status: DISCONTINUED | OUTPATIENT
Start: 2022-08-12 | End: 2022-08-12 | Stop reason: HOSPADM

## 2022-08-12 RX ORDER — DEXAMETHASONE SODIUM PHOSPHATE 4 MG/ML
INJECTION, SOLUTION INTRA-ARTICULAR; INTRALESIONAL; INTRAMUSCULAR; INTRAVENOUS; SOFT TISSUE
Status: DISCONTINUED | OUTPATIENT
Start: 2022-08-12 | End: 2022-08-12

## 2022-08-12 RX ORDER — HYDROMORPHONE HYDROCHLORIDE 1 MG/ML
0.2 INJECTION, SOLUTION INTRAMUSCULAR; INTRAVENOUS; SUBCUTANEOUS EVERY 5 MIN PRN
Status: DISCONTINUED | OUTPATIENT
Start: 2022-08-12 | End: 2022-08-12 | Stop reason: HOSPADM

## 2022-08-12 RX ORDER — OXYCODONE HYDROCHLORIDE 5 MG/1
5 TABLET ORAL
Status: DISCONTINUED | OUTPATIENT
Start: 2022-08-12 | End: 2022-08-12 | Stop reason: HOSPADM

## 2022-08-12 RX ORDER — CEFAZOLIN SODIUM/WATER 2 G/20 ML
2 SYRINGE (ML) INTRAVENOUS
Status: COMPLETED | OUTPATIENT
Start: 2022-08-12 | End: 2022-08-12

## 2022-08-12 RX ORDER — PROPOFOL 10 MG/ML
VIAL (ML) INTRAVENOUS
Status: DISCONTINUED | OUTPATIENT
Start: 2022-08-12 | End: 2022-08-12

## 2022-08-12 RX ORDER — ROCURONIUM BROMIDE 10 MG/ML
INJECTION, SOLUTION INTRAVENOUS
Status: DISCONTINUED | OUTPATIENT
Start: 2022-08-12 | End: 2022-08-12

## 2022-08-12 RX ORDER — TAMSULOSIN HYDROCHLORIDE 0.4 MG/1
0.4 CAPSULE ORAL DAILY
Qty: 30 CAPSULE | Refills: 0 | Status: SHIPPED | OUTPATIENT
Start: 2022-08-12 | End: 2023-10-13

## 2022-08-12 RX ORDER — MIDAZOLAM HYDROCHLORIDE 1 MG/ML
INJECTION, SOLUTION INTRAMUSCULAR; INTRAVENOUS
Status: DISCONTINUED | OUTPATIENT
Start: 2022-08-12 | End: 2022-08-12

## 2022-08-12 RX ORDER — PHENAZOPYRIDINE HYDROCHLORIDE 100 MG/1
100 TABLET, FILM COATED ORAL
Qty: 30 TABLET | Refills: 0 | Status: SHIPPED | OUTPATIENT
Start: 2022-08-12 | End: 2022-08-22

## 2022-08-12 RX ORDER — LIDOCAINE HYDROCHLORIDE 10 MG/ML
INJECTION, SOLUTION EPIDURAL; INFILTRATION; INTRACAUDAL; PERINEURAL
Status: DISCONTINUED | OUTPATIENT
Start: 2022-08-12 | End: 2022-08-12

## 2022-08-12 RX ORDER — EPHEDRINE SULFATE 50 MG/ML
INJECTION, SOLUTION INTRAVENOUS
Status: DISCONTINUED | OUTPATIENT
Start: 2022-08-12 | End: 2022-08-12

## 2022-08-12 RX ORDER — FENTANYL CITRATE 50 UG/ML
INJECTION, SOLUTION INTRAMUSCULAR; INTRAVENOUS
Status: DISCONTINUED | OUTPATIENT
Start: 2022-08-12 | End: 2022-08-12

## 2022-08-12 RX ORDER — OXYBUTYNIN CHLORIDE 5 MG/1
5 TABLET ORAL 3 TIMES DAILY PRN
Qty: 30 TABLET | Refills: 0 | Status: SHIPPED | OUTPATIENT
Start: 2022-08-12 | End: 2023-10-13

## 2022-08-12 RX ORDER — PHENYLEPHRINE HYDROCHLORIDE 10 MG/ML
INJECTION INTRAVENOUS
Status: DISCONTINUED | OUTPATIENT
Start: 2022-08-12 | End: 2022-08-12

## 2022-08-12 RX ORDER — NEOSTIGMINE METHYLSULFATE 0.5 MG/ML
INJECTION, SOLUTION INTRAVENOUS
Status: DISCONTINUED | OUTPATIENT
Start: 2022-08-12 | End: 2022-08-12

## 2022-08-12 RX ADMIN — Medication 2 G: at 01:08

## 2022-08-12 RX ADMIN — MIDAZOLAM 2 MG: 1 INJECTION INTRAMUSCULAR; INTRAVENOUS at 12:08

## 2022-08-12 RX ADMIN — LIDOCAINE HYDROCHLORIDE 50 MG: 10 INJECTION, SOLUTION EPIDURAL; INFILTRATION; INTRACAUDAL at 01:08

## 2022-08-12 RX ADMIN — SODIUM CHLORIDE: 0.9 INJECTION, SOLUTION INTRAVENOUS at 12:08

## 2022-08-12 RX ADMIN — ROCURONIUM BROMIDE 50 MG: 50 INJECTION INTRAVENOUS at 01:08

## 2022-08-12 RX ADMIN — DEXAMETHASONE SODIUM PHOSPHATE 4 MG: 4 INJECTION INTRA-ARTICULAR; INTRALESIONAL; INTRAMUSCULAR; INTRAVENOUS; SOFT TISSUE at 01:08

## 2022-08-12 RX ADMIN — PROPOFOL 200 MG: 10 INJECTION, EMULSION INTRAVENOUS at 01:08

## 2022-08-12 RX ADMIN — EPHEDRINE SULFATE 5 MG: 50 INJECTION INTRAVENOUS at 01:08

## 2022-08-12 RX ADMIN — EPHEDRINE SULFATE 10 MG: 50 INJECTION INTRAVENOUS at 02:08

## 2022-08-12 RX ADMIN — FENTANYL CITRATE 50 MCG: 50 INJECTION, SOLUTION INTRAMUSCULAR; INTRAVENOUS at 02:08

## 2022-08-12 RX ADMIN — ONDANSETRON 4 MG: 2 INJECTION INTRAMUSCULAR; INTRAVENOUS at 02:08

## 2022-08-12 RX ADMIN — EPHEDRINE SULFATE 10 MG: 50 INJECTION INTRAVENOUS at 01:08

## 2022-08-12 RX ADMIN — PHENYLEPHRINE HYDROCHLORIDE 100 MCG: 10 INJECTION INTRAVENOUS at 01:08

## 2022-08-12 RX ADMIN — FENTANYL CITRATE 50 MCG: 50 INJECTION, SOLUTION INTRAMUSCULAR; INTRAVENOUS at 01:08

## 2022-08-12 RX ADMIN — NEOSTIGMINE METHYLSULFATE 5 MG: 0.5 INJECTION, SOLUTION INTRAVENOUS at 02:08

## 2022-08-12 RX ADMIN — GLYCOPYRROLATE 0.6 MG: 0.2 INJECTION INTRAMUSCULAR; INTRAVENOUS at 02:08

## 2022-08-12 NOTE — INTERVAL H&P NOTE
The patient has been examined and the H&P has been reviewed:    I concur with the findings and no changes have occurred since H&P was written.    Surgery risks, benefits and alternative options discussed and understood by patient/family.    Urine dipstick - negative for all components.    Patient not taking blood thinners      There are no hospital problems to display for this patient.

## 2022-08-12 NOTE — ANESTHESIA PREPROCEDURE EVALUATION
08/12/2022  Josefina Mark is a 42 y.o., female.      Pre-op Assessment    I have reviewed the Patient Summary Reports.     I have reviewed the Nursing Notes. I have reviewed the NPO Status.   I have reviewed the Medications.     Review of Systems  Anesthesia Hx:  No problems with previous Anesthesia  History of prior surgery of interest to airway management or planning: Denies Family Hx of Anesthesia complications.   Denies Personal Hx of Anesthesia complications.   EENT/Dental:   Retinal hole   Cardiovascular:   Denies MI.  Denies CAD.    ECG has been reviewed.    Pulmonary:  Pulmonary Normal  Denies COPD.  Denies Sleep Apnea.    Renal/:   renal calculi    Hepatic/GI:  Hepatic/GI Normal    Musculoskeletal:  Musculoskeletal Normal    Neurological:  Neurology Normal Denies Seizures.    Endocrine:   Diabetes Hypothyroidism        Physical Exam  General: Well nourished, Cooperative, Alert and Oriented    Airway:  Mouth Opening: Normal  TM Distance: Normal  Tongue: Normal  Neck ROM: Normal ROM    Dental:  Intact    Chest/Lungs:  Clear to auscultation, Normal Respiratory Rate    Heart:  Rate: Normal  Rhythm: Regular Rhythm  Sounds: Normal    Abdomen:  Normal        Anesthesia Plan  Type of Anesthesia, risks & benefits discussed:    Anesthesia Type: Gen Natural Airway, Gen ETT, MAC  Intra-op Monitoring Plan: Standard ASA Monitors  Post Op Pain Control Plan: multimodal analgesia and IV/PO Opioids PRN  Induction:  IV  Airway Plan: Direct, Post-Induction  Informed Consent: Informed consent signed with the Patient and all parties understand the risks and agree with anesthesia plan.  All questions answered.   ASA Score: 2  Day of Surgery Review of History & Physical: H&P Update referred to the surgeon/provider.    Ready For Surgery From Anesthesia Perspective.     .

## 2022-08-12 NOTE — TRANSFER OF CARE
"Anesthesia Transfer of Care Note    Patient: Josefina Mark    Procedure(s) Performed: Procedure(s) (LRB):  CYSTOSCOPY  URETEROSCOPY (Right)  PYELOGRAM, RETROGRADE (Right)  LITHOTRIPSY, USING LASER (Right)  REMOVAL, CALCULUS, URETER, URETEROSCOPIC (Right)  STENT, URETERAL (Right)  PYELOSCOPY (Right)    Patient location: [facility]    Anesthesia Type: general    Transport from OR: Transported from OR on room air with adequate spontaneous ventilation    Post pain: adequate analgesia    Post assessment: no apparent anesthetic complications    Post vital signs: stable    Level of consciousness: alert    Nausea/Vomiting: no nausea/vomiting    Complications: none    Transfer of care protocol was followed      Last vitals:   Visit Vitals  /70 (BP Location: Left arm, Patient Position: Lying)   Pulse 79   Temp 36.8 °C (98.3 °F) (Skin)   Resp 16   Ht 5' 7" (1.702 m)   Wt 86.2 kg (190 lb)   LMP 07/17/2022 (Approximate)   SpO2 100%   Breastfeeding No   BMI 29.76 kg/m²     "

## 2022-08-12 NOTE — ANESTHESIA POSTPROCEDURE EVALUATION
Anesthesia Post Evaluation    Patient: Josefina Mark    Procedure(s) Performed: Procedure(s) (LRB):  CYSTOSCOPY  URETEROSCOPY (Right)  LITHOTRIPSY, USING LASER (Right)  REMOVAL, CALCULUS, URETER, URETEROSCOPIC (Right)  STENT, URETERAL (Right)  PYELOSCOPY (Right)    Final Anesthesia Type: general      Patient location during evaluation: PACU  Patient participation: Yes- Able to Participate  Level of consciousness: awake and alert  Post-procedure vital signs: reviewed and stable  Pain management: adequate  Airway patency: patent  ANDREA mitigation strategies: Extubation and recovery carried out in lateral, semiupright, or other nonsupine position  PONV status at discharge: No PONV  Anesthetic complications: no      Cardiovascular status: blood pressure returned to baseline  Respiratory status: room air and spontaneous ventilation  Hydration status: euvolemic  Follow-up not needed.          Vitals Value Taken Time   /64 08/12/22 1517   Temp 36.5 °C (97.7 °F) 08/12/22 1439   Pulse 61 08/12/22 1520   Resp 18 08/12/22 1515   SpO2 99 % 08/12/22 1520   Vitals shown include unvalidated device data.      No case tracking events are documented in the log.      Pain/Nasra Score: Nasra Score: 10 (8/12/2022  2:39 PM)

## 2022-08-12 NOTE — ANESTHESIA PROCEDURE NOTES
Intubation    Date/Time: 8/12/2022 1:12 PM  Performed by: Mary Miranda CRNA  Authorized by: Mary Jane Amaya MD     Intubation:     Induction:  Intravenous    Mask Ventilation:  Easy mask    Attempts:  1    Attempted By:  CRNA (1st look with SRNA)    Blade:  Cook 2    Laryngeal View Grade: Grade I - full view of cords      Difficult Airway Encountered?: No      Complications:  None    Airway Device:  Oral endotracheal tube    Airway Device Size:  7.0    Style/Cuff Inflation:  Cuffed    Inflation Amount (mL):  4    Tube secured:  22    Secured at:  The teeth    Placement Verified By:  Capnometry    Complicating Factors:  Anterior larynx    Findings Post-Intubation:  BS equal bilateral

## 2022-08-12 NOTE — DISCHARGE SUMMARY
[facility]  Discharge Note  Short Stay    Admit Date: 8/12/2022    Discharge Date and Time: 08/12/2022 2:30 PM      Attending Physician: Rachid Mane Jr., MD     Discharge Provider: Lasha Schaefer    Diagnoses:  Past Medical History:   Diagnosis Date    Diabetes mellitus, type 2     Fever blister     Hypothyroidism        Discharged Condition: good    Hospital Course: Patient was admitted for ureteroscopy and tolerated the procedure well with no complications. The patient was discharged home in good condition on the same day.       Final Diagnoses: Same as principal problem.    Disposition: Home or Self Care    Follow up/Patient Instructions:    Medications:  Reconciled Home Medications:   Current Discharge Medication List      START taking these medications    Details   oxybutynin (DITROPAN) 5 MG Tab Take 1 tablet (5 mg total) by mouth 3 (three) times daily as needed (bladder spasms).  Qty: 30 tablet, Refills: 0      phenazopyridine (PYRIDIUM) 100 MG tablet Take 1 tablet (100 mg total) by mouth 3 (three) times daily with meals. for 10 days  Qty: 30 tablet, Refills: 0      !! tamsulosin (FLOMAX) 0.4 mg Cap Take 1 capsule (0.4 mg total) by mouth once daily.  Qty: 30 capsule, Refills: 0       !! - Potential duplicate medications found. Please discuss with provider.      CONTINUE these medications which have NOT CHANGED    Details   HYDROcodone-acetaminophen (NORCO)  mg per tablet Take 1 tablet by mouth every 6 (six) hours as needed for Pain.  Qty: 10 tablet, Refills: 0    Comments: Quantity prescribed more than 7 day supply? No      ketorolac (TORADOL) 10 mg tablet Take 1 tablet (10 mg total) by mouth 2 (two) times daily as needed for Pain.  Qty: 14 tablet, Refills: 0      levothyroxine (SYNTHROID) 125 MCG tablet Take 1 tablet (125 mcg total) by mouth once daily.  Qty: 90 tablet, Refills: 2    Associated Diagnoses: Hypothyroidism, unspecified type      metFORMIN (GLUCOPHAGE-XR) 500 MG ER 24hr  tablet Take 2 tablets (1,000 mg total) by mouth 2 (two) times daily with meals.  Qty: 360 tablet, Refills: 2    Associated Diagnoses: Type 2 diabetes mellitus without complication, without long-term current use of insulin      !! tamsulosin (FLOMAX) 0.4 mg Cap Take 1 capsule (0.4 mg total) by mouth once daily. for 14 days  Qty: 14 capsule, Refills: 0      blood sugar diagnostic Strp 1 strip by Misc.(Non-Drug; Combo Route) route once daily.  Qty: 50 strip, Refills: 11    Associated Diagnoses: Type 2 diabetes mellitus without complication, without long-term current use of insulin      blood-glucose meter kit Use as instructed  Qty: 1 each, Refills: 0    Associated Diagnoses: Type 2 diabetes mellitus without complication, without long-term current use of insulin      dicyclomine (BENTYL) 20 mg tablet Take 1 tablet (20 mg total) by mouth every 6 (six) hours.  Qty: 120 tablet, Refills: 0    Associated Diagnoses: Abdominal pain, unspecified abdominal location      lancets Misc 1 lancet by Misc.(Non-Drug; Combo Route) route once daily.  Qty: 100 each, Refills: 3    Associated Diagnoses: Type 2 diabetes mellitus without complication, without long-term current use of insulin      ondansetron (ZOFRAN-ODT) 4 MG TbDL Take 1 tablet (4 mg total) by mouth every 6 (six) hours as needed (nausea). One tablet sublingual tid prn nausea  Qty: 10 tablet, Refills: 0    Associated Diagnoses: Nausea and vomiting, intractability of vomiting not specified, unspecified vomiting type       !! - Potential duplicate medications found. Please discuss with provider.        Discharge Procedure Orders   X-Ray KUB   Standing Status: Future Standing Exp. Date: 08/12/23     Order Specific Question Answer Comments   May the Radiologist modify the order per protocol to meet the clinical needs of the patient? Yes    Release to patient Immediate      US Kidney   Standing Status: Future Standing Exp. Date: 11/12/22     Order Specific Question Answer Comments    May the Radiologist modify the order per protocol to meet the clinical needs of the patient? Yes    Release to patient Immediate      Notify your health care provider if you experience any of the following:  temperature >100.4     Notify your health care provider if you experience any of the following:  persistent nausea and vomiting or diarrhea     Notify your health care provider if you experience any of the following:  severe uncontrolled pain     Notify your health care provider if you experience any of the following:  difficulty breathing or increased cough     Notify your health care provider if you experience any of the following:  severe persistent headache     Notify your health care provider if you experience any of the following:  persistent dizziness, light-headedness, or visual disturbances     Notify your health care provider if you experience any of the following:  increased confusion or weakness     Other restrictions (specify):   Order Comments: Remove stent with strings on Wednesday.      Follow-up Information     Rachid Mane Jr, MD Follow up in 1 month(s).    Specialty: Urology  Why: with KUB, renal u/s prior  Contact information:  [address] [place] [id number]  [phone]                         Discharge Procedure Orders (must include Diet, Follow-up, Activity):   Discharge Procedure Orders (must include Diet, Follow-up, Activity)   X-Ray KUB   Standing Status: Future Standing Exp. Date: 08/12/23     Order Specific Question Answer Comments   May the Radiologist modify the order per protocol to meet the clinical needs of the patient? Yes    Release to patient Immediate      US Kidney   Standing Status: Future Standing Exp. Date: 11/12/22     Order Specific Question Answer Comments   May the Radiologist modify the order per protocol to meet the clinical needs of the patient? Yes    Release to patient Immediate      Notify your health care provider if you experience any of the  following:  temperature >100.4     Notify your health care provider if you experience any of the following:  persistent nausea and vomiting or diarrhea     Notify your health care provider if you experience any of the following:  severe uncontrolled pain     Notify your health care provider if you experience any of the following:  difficulty breathing or increased cough     Notify your health care provider if you experience any of the following:  severe persistent headache     Notify your health care provider if you experience any of the following:  persistent dizziness, light-headedness, or visual disturbances     Notify your health care provider if you experience any of the following:  increased confusion or weakness     Other restrictions (specify):   Order Comments: Remove stent with strings on Wednesday.

## 2022-08-12 NOTE — PLAN OF CARE
Patient tolerated procedure and anesthesia with no complaints of pain or nausea. Discharge material given and explained to patient and family. Both verbalized understanding. Patient wheeled to ride in stable condition with no complaints.

## 2022-08-12 NOTE — OP NOTE
[facility]  Operative Note    Date: 08/12/2022    Pre-Op Diagnosis: Right ureteral stone  Patient Active Problem List    Diagnosis Date Noted    Right kidney stone 08/09/2022    Retinal hole of left eye 06/19/2017    Diabetes mellitus type 2 without retinopathy 06/19/2017    Type 2 diabetes mellitus without complication, without long-term current use of insulin 04/14/2017    Vitamin D insufficiency 04/14/2017    Hypothyroidism affecting pregnancy in second trimester 01/18/2013     Post-Op Diagnosis: Same    Procedure(s) Performed:   1.  Right ureteroscopy  2.  Cystoscopy  3.  Laser lithotripsy  4.  Stone basket extraction  5.  Fluoro < 1 h  6.  Right ureteral stent insertion    Specimen(s): Stone for analysis    Staff Surgeon: Rachid Mane MD    Assistant Surgeon: Lasha Schaefer MD    Anesthesia: General endotracheal anesthesia    Indications: Josefina Mark is a 42 y.o. female with a right ureteral stone, presenting for definitive stone management.  She currently does not have a JJ ureteral stent in place.      Findings:   Radiopaque right ureteral stone  Proximal ureteral stone pushed into upper pole, dusted and fragments extracted using basket    1 baskets were used throughout the case.      Estimated Blood Loss: min    Drains: 6 Fr x 24 cm JJ ureteral stent with strings    Procedure in detail:  After informed consent was obtained, the patient was brought the the cystoscopy suite and placed in the supine position.  SCDs were applied and working.  Anesthesia was administered.  The patient was then placed in the dorsal lithotomy position and prepped and draped in the usual sterile fashion.      A rigid cystoscope in a 22 Fr sheath was introduced into the patient's urethra.  This passed easily.  The entire urethra was visualized which showed no strictures or masses.  Formal cystoscopy was performed which revealed no masses or lesions suspicious for malignancy, no bladder stones, no  bladder diverticuli, no trabeculations.  The ureteral orifices were visualized in the normal anatomic position bilaterally and clear efflux was visualized.      A motion wire was passed up the right ureteral orifice and up into the kidney.  This passed easily and placement was confirmed using fluoro.  The cystoscope was removed keeping the guidewire in place and the wire was secured to the drape.      An 8 Fr rigid ureteroscope was passed into the patient's bladder alongside the wire under direct vision.  It was then passed through the right ureteral orifice alongside the wire.  A stone was encountered at the level of proximal right ureter. The stone was pushed into the upper pole using the ureteroscope.     A stiff glide wire was introduced through the ureteroscope and the ureteroscope was removed leaving both wires in place.  A ureteral access sheath was passed over the stiff glide wire and into the proximal ureter. This was performed under live fluoroscopy. The inner dilator and stiff glide wire were removed and a flexible ureteroscope was introduced into the outer sheath.  A 272 micron laser fiber was passed through the ureteroscope.  The stone was fragmented using the laser.  The stone was dusted until only two small fragments remained. The laser fiber was removed and a Nitinol tipless basket was introduced through the ureteroscope.  Stone fragments were removed.  The ureteroscope was removed keeping the motion wire in place.  The ureteral access sheath was removed under direct vision revealing no evidence of ureteral injury. The bladder was drained and the cystoscope removed keeping the wire in place.      A 6 Fr x 24 cm JJ ureteral stent with strings was passed over the wire and up into the renal pelvis using fluoro.  When the coil appeared to be in good position in the kidney and the radio-opaque marker of the pusher was at the inferior pubis, the wire was removed under continuous fluoro.  Good coils were  seen in the kidney and the bladder using fluoro.      The patient tolerated the procedure well and was transferred to the recovery room in stable condition.      Disposition:  The patient will follow up with Dr. Mane in 4 weeks with a KUB and renal u/s prior. Patient will remove stent with strings on Wednesday.    Lasha Schaefer MD

## 2022-08-18 ENCOUNTER — TELEPHONE (OUTPATIENT)
Dept: URGENT CARE | Facility: CLINIC | Age: 42
End: 2022-08-18
Payer: COMMERCIAL

## 2022-08-18 NOTE — TELEPHONE ENCOUNTER
----- Message from Hilaria Segovia PA-C sent at 8/16/2022  5:07 PM CDT -----  Please let patient know of negative urine culture.  Thanks.

## 2022-08-18 NOTE — TELEPHONE ENCOUNTER
Spoke to pt reviewed results. Pt informed me she happened to have a kidney stone and she had surgery on Friday. She is feeling much better

## 2022-08-19 LAB
COMPN STONE: NORMAL
SPECIMEN SOURCE: NORMAL
STONE ANALYSIS IR-IMP: NORMAL

## 2022-09-29 ENCOUNTER — PATIENT OUTREACH (OUTPATIENT)
Dept: ADMINISTRATIVE | Facility: HOSPITAL | Age: 42
End: 2022-09-29
Payer: COMMERCIAL

## 2022-09-29 ENCOUNTER — PATIENT MESSAGE (OUTPATIENT)
Dept: ADMINISTRATIVE | Facility: HOSPITAL | Age: 42
End: 2022-09-29
Payer: COMMERCIAL

## 2022-11-23 ENCOUNTER — HOSPITAL ENCOUNTER (OUTPATIENT)
Dept: RADIOLOGY | Facility: HOSPITAL | Age: 42
Discharge: HOME | End: 2022-11-23
Attending: STUDENT IN AN ORGANIZED HEALTH CARE EDUCATION/TRAINING PROGRAM
Payer: COMMERCIAL

## 2022-11-23 DIAGNOSIS — N20.1 URETERAL STONE: ICD-10-CM

## 2022-11-23 PROCEDURE — 74018 RADEX ABDOMEN 1 VIEW: CPT | Mod: TC

## 2022-11-23 PROCEDURE — 74018 XR KUB: ICD-10-PCS | Mod: 26,,, | Performed by: RADIOLOGY

## 2022-11-23 PROCEDURE — 74018 RADEX ABDOMEN 1 VIEW: CPT | Mod: 26,,, | Performed by: RADIOLOGY

## 2022-12-31 ENCOUNTER — LAB VISIT (OUTPATIENT)
Dept: LAB | Facility: HOSPITAL | Age: 42
End: 2022-12-31
Payer: COMMERCIAL

## 2022-12-31 DIAGNOSIS — E11.9 TYPE 2 DIABETES MELLITUS WITHOUT COMPLICATION, WITHOUT LONG-TERM CURRENT USE OF INSULIN: ICD-10-CM

## 2022-12-31 DIAGNOSIS — E03.9 HYPOTHYROIDISM, UNSPECIFIED TYPE: ICD-10-CM

## 2022-12-31 DIAGNOSIS — Z00.00 ROUTINE MEDICAL EXAM: ICD-10-CM

## 2022-12-31 LAB
ALBUMIN SERPL BCP-MCNC: 4 G/DL (ref 3.5–5.2)
ALP SERPL-CCNC: 50 U/L (ref 55–135)
ALT SERPL W/O P-5'-P-CCNC: 21 U/L (ref 10–44)
ANION GAP SERPL CALC-SCNC: 9 MMOL/L (ref 8–16)
AST SERPL-CCNC: 17 U/L (ref 10–40)
BILIRUB SERPL-MCNC: 0.6 MG/DL (ref 0.1–1)
BUN SERPL-MCNC: 13 MG/DL (ref 6–20)
CALCIUM SERPL-MCNC: 9.2 MG/DL (ref 8.7–10.5)
CHLORIDE SERPL-SCNC: 101 MMOL/L (ref 95–110)
CHOLEST SERPL-MCNC: 151 MG/DL (ref 120–199)
CHOLEST/HDLC SERPL: 2.9 {RATIO} (ref 2–5)
CO2 SERPL-SCNC: 23 MMOL/L (ref 23–29)
CREAT SERPL-MCNC: 0.8 MG/DL (ref 0.5–1.4)
ERYTHROCYTE [DISTWIDTH] IN BLOOD BY AUTOMATED COUNT: 12.4 % (ref 11.5–14.5)
EST. GFR  (NO RACE VARIABLE): >60 ML/MIN/1.73 M^2
ESTIMATED AVG GLUCOSE: 194 MG/DL (ref 68–131)
GLUCOSE SERPL-MCNC: 125 MG/DL (ref 70–110)
HBA1C MFR BLD: 8.4 % (ref 4–5.6)
HCT VFR BLD AUTO: 39.5 % (ref 37–48.5)
HDLC SERPL-MCNC: 52 MG/DL (ref 40–75)
HDLC SERPL: 34.4 % (ref 20–50)
HGB BLD-MCNC: 12.4 G/DL (ref 12–16)
LDLC SERPL CALC-MCNC: 84.2 MG/DL (ref 63–159)
MCH RBC QN AUTO: 27.7 PG (ref 27–31)
MCHC RBC AUTO-ENTMCNC: 31.4 G/DL (ref 32–36)
MCV RBC AUTO: 88 FL (ref 82–98)
NONHDLC SERPL-MCNC: 99 MG/DL
PLATELET # BLD AUTO: 275 K/UL (ref 150–450)
PMV BLD AUTO: 11.2 FL (ref 9.2–12.9)
POTASSIUM SERPL-SCNC: 4 MMOL/L (ref 3.5–5.1)
PROT SERPL-MCNC: 7.4 G/DL (ref 6–8.4)
RBC # BLD AUTO: 4.47 M/UL (ref 4–5.4)
SODIUM SERPL-SCNC: 133 MMOL/L (ref 136–145)
T4 FREE SERPL-MCNC: 0.92 NG/DL (ref 0.71–1.51)
TRIGL SERPL-MCNC: 74 MG/DL (ref 30–150)
TSH SERPL DL<=0.005 MIU/L-ACNC: 6.37 UIU/ML (ref 0.4–4)
WBC # BLD AUTO: 6.03 K/UL (ref 3.9–12.7)

## 2022-12-31 PROCEDURE — 80061 LIPID PANEL: CPT | Performed by: INTERNAL MEDICINE

## 2022-12-31 PROCEDURE — 36415 COLL VENOUS BLD VENIPUNCTURE: CPT | Performed by: NURSE PRACTITIONER

## 2022-12-31 PROCEDURE — 84443 ASSAY THYROID STIM HORMONE: CPT | Performed by: INTERNAL MEDICINE

## 2022-12-31 PROCEDURE — 85027 COMPLETE CBC AUTOMATED: CPT | Performed by: INTERNAL MEDICINE

## 2022-12-31 PROCEDURE — 80053 COMPREHEN METABOLIC PANEL: CPT | Performed by: INTERNAL MEDICINE

## 2022-12-31 PROCEDURE — 83036 HEMOGLOBIN GLYCOSYLATED A1C: CPT | Performed by: INTERNAL MEDICINE

## 2022-12-31 PROCEDURE — 84439 ASSAY OF FREE THYROXINE: CPT | Performed by: NURSE PRACTITIONER

## 2023-01-02 ENCOUNTER — PATIENT MESSAGE (OUTPATIENT)
Dept: PRIMARY CARE CLINIC | Facility: CLINIC | Age: 43
End: 2023-01-02
Payer: COMMERCIAL

## 2023-01-02 ENCOUNTER — TELEPHONE (OUTPATIENT)
Dept: PRIMARY CARE CLINIC | Facility: CLINIC | Age: 43
End: 2023-01-02
Payer: COMMERCIAL

## 2023-01-03 NOTE — TELEPHONE ENCOUNTER
Labs in advance of physical three months from now are abnormal and need attention. If we can't move physical up to January, please schedule a virtual visit to discuss.

## 2023-01-09 ENCOUNTER — OFFICE VISIT (OUTPATIENT)
Dept: PRIMARY CARE CLINIC | Facility: CLINIC | Age: 43
End: 2023-01-09
Payer: COMMERCIAL

## 2023-01-09 DIAGNOSIS — E11.65 UNCONTROLLED TYPE 2 DIABETES MELLITUS WITH HYPERGLYCEMIA, WITHOUT LONG-TERM CURRENT USE OF INSULIN: Primary | ICD-10-CM

## 2023-01-09 DIAGNOSIS — E03.9 HYPOTHYROIDISM, UNSPECIFIED TYPE: ICD-10-CM

## 2023-01-09 PROCEDURE — 1160F PR REVIEW ALL MEDS BY PRESCRIBER/CLIN PHARMACIST DOCUMENTED: ICD-10-PCS | Mod: CPTII,95,, | Performed by: INTERNAL MEDICINE

## 2023-01-09 PROCEDURE — 99214 OFFICE O/P EST MOD 30 MIN: CPT | Mod: 95,,, | Performed by: INTERNAL MEDICINE

## 2023-01-09 PROCEDURE — 1159F MED LIST DOCD IN RCRD: CPT | Mod: CPTII,95,, | Performed by: INTERNAL MEDICINE

## 2023-01-09 PROCEDURE — 1159F PR MEDICATION LIST DOCUMENTED IN MEDICAL RECORD: ICD-10-PCS | Mod: CPTII,95,, | Performed by: INTERNAL MEDICINE

## 2023-01-09 PROCEDURE — 3072F LOW RISK FOR RETINOPATHY: CPT | Mod: CPTII,95,, | Performed by: INTERNAL MEDICINE

## 2023-01-09 PROCEDURE — 1160F RVW MEDS BY RX/DR IN RCRD: CPT | Mod: CPTII,95,, | Performed by: INTERNAL MEDICINE

## 2023-01-09 PROCEDURE — 3072F PR LOW RISK FOR RETINOPATHY: ICD-10-PCS | Mod: CPTII,95,, | Performed by: INTERNAL MEDICINE

## 2023-01-09 PROCEDURE — 99214 PR OFFICE/OUTPT VISIT, EST, LEVL IV, 30-39 MIN: ICD-10-PCS | Mod: 95,,, | Performed by: INTERNAL MEDICINE

## 2023-01-13 ENCOUNTER — TELEPHONE (OUTPATIENT)
Dept: PRIMARY CARE CLINIC | Facility: CLINIC | Age: 43
End: 2023-01-13

## 2023-01-13 NOTE — TELEPHONE ENCOUNTER
----- Message from Yoselin Elmore sent at 1/13/2023  2:21 PM CST -----  Contact: [phone]  Patient called, requested a courtesy call from Dr Garcia office about patient talk with pcp about getting ozempic, but was not sent out. Please advise. Thank you

## 2023-01-14 RX ORDER — LEVOTHYROXINE SODIUM 125 UG/1
125 TABLET ORAL DAILY
Qty: 90 TABLET | Refills: 0 | Status: SHIPPED | OUTPATIENT
Start: 2023-01-14 | End: 2023-03-16

## 2023-01-14 RX ORDER — METFORMIN HYDROCHLORIDE 500 MG/1
1000 TABLET, EXTENDED RELEASE ORAL 2 TIMES DAILY WITH MEALS
Qty: 360 TABLET | Refills: 1 | Status: SHIPPED | OUTPATIENT
Start: 2023-01-14 | End: 2023-10-13 | Stop reason: SDUPTHER

## 2023-01-18 NOTE — PROGRESS NOTES
Subjective:       Patient ID: Josefina Mark is a 42 y.o. female.    Chief Complaint: Diabetes    The patient location is: [place].  The chief complaint leading to consultation is: Diabetes.    Visit type: audiovisual    Face to Face time with patient: 15 minutes.  22 minutes of total time spent on the encounter, which includes face to face time and non-face to face time preparing to see the patient (eg, review of tests), Obtaining and/or reviewing separately obtained history, Documenting clinical information in the electronic or other health record, Independently interpreting results (not separately reported) and communicating results to the patient/family/caregiver, or Care coordination (not separately reported).     Each patient to whom he or she provides medical services by telemedicine is:  (1) informed of the relationship between the physician and patient and the respective role of any other health care provider with respect to management of the patient; and (2) notified that he or she may decline to receive medical services by telemedicine and may withdraw from such care at any time.    Notes:   Last seen by me 19 months ago (again). Presents for follow up diabetes and hypothyroidism. Labs advised asap upon calling for med refills, though office visit is 3 months away. She admits inconsistent compliance with meds and diet, and glucose is not as well controlled as it had been. Taking Metformin 1,000 mg once daily, not BID as prescribed. No numerical readings of glucose or blood pressure offered. She has gained back some of the weight she'd lost, and would like assistance with this. She is a candidate for treatment with Ozempic or similar drug.     PMH: .   Diabetes Type 2.  Hypothyroidism.  Vitamin D insufficiency.   Obesity.  Nephrolithiasis.    Pap 10/21, Mammogram normal . Eye exam . Vaccines reviewed.     PSH: None.     Social: Nonsmoker, no alcohol.  with 4 children. RN at  [employer].     FMH: Lung cancer in father. Mother has interstitial cystitis, a grandmother has diabetes and hypertension, paternal grandfather with prostate cancer, maternal grandmother with breast cancer at age 58, maternal aunt x2 with breast cancer, maternal grandfather with liver cancer, paternal aunt had colon cancer.      NKDA.    Medications: list reviewed and reconciled.                  Diabetes  She has type 2 diabetes mellitus. No MedicAlert identification noted. Pertinent negatives for hypoglycemia include no confusion, dizziness, headaches, hunger, mood changes, nervousness/anxiousness, pallor, seizures, sleepiness, speech difficulty, sweats or tremors. Pertinent negatives for diabetes include no blurred vision, no chest pain, no fatigue, no foot paresthesias, no foot ulcerations, no polydipsia, no polyphagia, no polyuria, no visual change, no weakness and no weight loss. Pertinent negatives for hypoglycemia complications include no blackouts, no hospitalization, no nocturnal hypoglycemia, no required assistance and no required glucagon injection. Pertinent negatives for diabetic complications include no autonomic neuropathy, CVA, heart disease, nephropathy, peripheral neuropathy, PVD or retinopathy. Current diabetic treatment includes oral agent (monotherapy).   Review of Systems   Constitutional:  Negative for fatigue and weight loss.   Eyes:  Negative for blurred vision.   Respiratory:  Negative for cough and shortness of breath.    Cardiovascular:  Negative for chest pain, palpitations and leg swelling.   Gastrointestinal:  Negative for abdominal pain, diarrhea, nausea and vomiting.   Endocrine: Negative for polydipsia, polyphagia and polyuria.   Integumentary:  Negative for pallor.   Neurological:  Negative for dizziness, tremors, seizures, speech difficulty, weakness and headaches.   Psychiatric/Behavioral:  Negative for confusion. The patient is not nervous/anxious.        Objective:      Physical  Exam  Constitutional:       General: She is not in acute distress.     Appearance: She is not ill-appearing.   Pulmonary:      Effort: Pulmonary effort is normal. No respiratory distress.   Neurological:      Mental Status: She is alert.   Psychiatric:         Mood and Affect: Mood normal.         Behavior: Behavior normal.         Thought Content: Thought content normal.       Lab Visit on 12/31/2022   Component Date Value    Free T4 12/31/2022 0.92     WBC 12/31/2022 6.03     RBC 12/31/2022 4.47     Hemoglobin 12/31/2022 12.4     Hematocrit 12/31/2022 39.5     MCV 12/31/2022 88     MCH 12/31/2022 27.7     MCHC 12/31/2022 31.4 (L)     RDW 12/31/2022 12.4     Platelets 12/31/2022 275     MPV 12/31/2022 11.2     Sodium 12/31/2022 133 (L)     Potassium 12/31/2022 4.0     Chloride 12/31/2022 101     CO2 12/31/2022 23     Glucose 12/31/2022 125 (H)     BUN 12/31/2022 13     Creatinine 12/31/2022 0.8     Calcium 12/31/2022 9.2     Total Protein 12/31/2022 7.4     Albumin 12/31/2022 4.0     Total Bilirubin 12/31/2022 0.6     Alkaline Phosphatase 12/31/2022 50 (L)     AST 12/31/2022 17     ALT 12/31/2022 21     Anion Gap 12/31/2022 9     eGFR 12/31/2022 >60.0     Cholesterol 12/31/2022 151     Triglycerides 12/31/2022 74     HDL 12/31/2022 52     LDL Cholesterol 12/31/2022 84.2     HDL/Cholesterol Ratio 12/31/2022 34.4     Total Cholesterol/HDL Ra* 12/31/2022 2.9     Non-HDL Cholesterol 12/31/2022 99     Hemoglobin A1C 12/31/2022 8.4 (H)     Estimated Avg Glucose 12/31/2022 194 (H)     TSH 12/31/2022 6.367 (H)      Assessment:       Problem List Items Addressed This Visit    None  Visit Diagnoses       Uncontrolled type 2 diabetes mellitus with hyperglycemia, without long-term current use of insulin    -  Primary    Relevant Medications    metFORMIN (GLUCOPHAGE-XR) 500 MG ER 24hr tablet    semaglutide (OZEMPIC) 0.25 mg or 0.5 mg(2 mg/1.5 mL) pen injector    Hypothyroidism, unspecified type        Relevant Medications     levothyroxine (SYNTHROID) 125 MCG tablet              Plan:       Uncontrolled type 2 diabetes mellitus with hyperglycemia, without long-term current use of insulin  -     metFORMIN (GLUCOPHAGE-XR) 500 MG ER 24hr tablet; Take 2 tablets (1,000 mg total) by mouth 2 (two) times daily with meals.  Dispense: 360 tablet; Refill: 1  -     semaglutide (OZEMPIC) 0.25 mg or 0.5 mg(2 mg/1.5 mL) pen injector; Inject 0.25 mg into the skin every 7 days.  Dispense: 1 pen; Refill: 0  -     Hemoglobin A1C; Future; Expected date: 01/18/2023    Hypothyroidism, unspecified type  -     levothyroxine (SYNTHROID) 125 MCG tablet; Take 1 tablet (125 mcg total) by mouth once daily.  Dispense: 90 tablet; Refill: 0  -     TSH; Future; Expected date: 01/18/2023    Return to office as scheduled in March with labs above.

## 2023-02-07 ENCOUNTER — CLINICAL SUPPORT (OUTPATIENT)
Dept: OTHER | Facility: CLINIC | Age: 43
End: 2023-02-07
Payer: COMMERCIAL

## 2023-02-07 DIAGNOSIS — Z00.8 ENCOUNTER FOR OTHER GENERAL EXAMINATION: ICD-10-CM

## 2023-02-08 VITALS
BODY MASS INDEX: 29.03 KG/M2 | WEIGHT: 185 LBS | SYSTOLIC BLOOD PRESSURE: 102 MMHG | DIASTOLIC BLOOD PRESSURE: 72 MMHG | HEIGHT: 67 IN

## 2023-02-08 LAB
HDLC SERPL-MCNC: 46 MG/DL
POC CHOLESTEROL, LDL (DOCK): 47 MG/DL
POC CHOLESTEROL, TOTAL: 107 MG/DL
POC GLUCOSE, FASTING: 127 MG/DL (ref 60–110)
TRIGL SERPL-MCNC: 65 MG/DL

## 2023-03-01 DIAGNOSIS — E11.9 TYPE 2 DIABETES MELLITUS WITHOUT COMPLICATION: ICD-10-CM

## 2023-03-03 ENCOUNTER — PATIENT MESSAGE (OUTPATIENT)
Dept: PRIMARY CARE CLINIC | Facility: CLINIC | Age: 43
End: 2023-03-03
Payer: COMMERCIAL

## 2023-03-03 DIAGNOSIS — E11.65 UNCONTROLLED TYPE 2 DIABETES MELLITUS WITH HYPERGLYCEMIA, WITHOUT LONG-TERM CURRENT USE OF INSULIN: ICD-10-CM

## 2023-03-03 RX ORDER — SEMAGLUTIDE 1.34 MG/ML
0.5 INJECTION, SOLUTION SUBCUTANEOUS
Qty: 1 PEN | Refills: 0 | Status: SHIPPED | OUTPATIENT
Start: 2023-03-03 | End: 2023-04-03 | Stop reason: DRUGHIGH

## 2023-03-03 NOTE — TELEPHONE ENCOUNTER
No new care gaps identified.  [facility] Embedded Care Gaps. Reference number: [id number]. 3/03/2023   6:20:55 AM CST

## 2023-03-13 ENCOUNTER — PATIENT OUTREACH (OUTPATIENT)
Dept: ADMINISTRATIVE | Facility: HOSPITAL | Age: 43
End: 2023-03-13
Payer: COMMERCIAL

## 2023-03-13 NOTE — PROGRESS NOTES
Health Maintenance Due   Topic Date Due    Pneumococcal Vaccines (Age 0-64) (2 - PCV) 11/14/2020    COVID-19 Vaccine (4 - Booster for Pfizer series) 02/15/2022    Diabetes Urine Screening  06/25/2022    Foot Exam  06/25/2022        Chart review done.    updated.   Immunizations reviewed & updated.   Care Everywhere updated.

## 2023-03-27 ENCOUNTER — LAB VISIT (OUTPATIENT)
Dept: LAB | Facility: HOSPITAL | Age: 43
End: 2023-03-27
Attending: INTERNAL MEDICINE
Payer: COMMERCIAL

## 2023-03-27 ENCOUNTER — OFFICE VISIT (OUTPATIENT)
Dept: PRIMARY CARE CLINIC | Facility: CLINIC | Age: 43
End: 2023-03-27
Payer: COMMERCIAL

## 2023-03-27 VITALS
BODY MASS INDEX: 27.68 KG/M2 | WEIGHT: 176.38 LBS | HEIGHT: 67 IN | TEMPERATURE: 98 F | HEART RATE: 88 BPM | SYSTOLIC BLOOD PRESSURE: 106 MMHG | DIASTOLIC BLOOD PRESSURE: 70 MMHG | OXYGEN SATURATION: 98 %

## 2023-03-27 DIAGNOSIS — Z00.00 ROUTINE MEDICAL EXAM: Primary | ICD-10-CM

## 2023-03-27 DIAGNOSIS — Z12.31 ENCOUNTER FOR SCREENING MAMMOGRAM FOR BREAST CANCER: ICD-10-CM

## 2023-03-27 DIAGNOSIS — E11.65 UNCONTROLLED TYPE 2 DIABETES MELLITUS WITH HYPERGLYCEMIA, WITHOUT LONG-TERM CURRENT USE OF INSULIN: ICD-10-CM

## 2023-03-27 DIAGNOSIS — E11.9 TYPE 2 DIABETES MELLITUS WITHOUT COMPLICATION, WITHOUT LONG-TERM CURRENT USE OF INSULIN: ICD-10-CM

## 2023-03-27 DIAGNOSIS — E03.9 HYPOTHYROIDISM, UNSPECIFIED TYPE: ICD-10-CM

## 2023-03-27 DIAGNOSIS — Z23 NEED FOR COVID-19 VACCINE: ICD-10-CM

## 2023-03-27 LAB
T4 FREE SERPL-MCNC: 1.36 NG/DL (ref 0.71–1.51)
TSH SERPL DL<=0.005 MIU/L-ACNC: 0.2 UIU/ML (ref 0.4–4)

## 2023-03-27 PROCEDURE — 3074F PR MOST RECENT SYSTOLIC BLOOD PRESSURE < 130 MM HG: ICD-10-PCS | Mod: CPTII,S$GLB,, | Performed by: INTERNAL MEDICINE

## 2023-03-27 PROCEDURE — 1159F MED LIST DOCD IN RCRD: CPT | Mod: CPTII,S$GLB,, | Performed by: INTERNAL MEDICINE

## 2023-03-27 PROCEDURE — 3008F PR BODY MASS INDEX (BMI) DOCUMENTED: ICD-10-PCS | Mod: CPTII,S$GLB,, | Performed by: INTERNAL MEDICINE

## 2023-03-27 PROCEDURE — 3072F LOW RISK FOR RETINOPATHY: CPT | Mod: CPTII,S$GLB,, | Performed by: INTERNAL MEDICINE

## 2023-03-27 PROCEDURE — 1160F PR REVIEW ALL MEDS BY PRESCRIBER/CLIN PHARMACIST DOCUMENTED: ICD-10-PCS | Mod: CPTII,S$GLB,, | Performed by: INTERNAL MEDICINE

## 2023-03-27 PROCEDURE — 84443 ASSAY THYROID STIM HORMONE: CPT | Performed by: INTERNAL MEDICINE

## 2023-03-27 PROCEDURE — 3078F DIAST BP <80 MM HG: CPT | Mod: CPTII,S$GLB,, | Performed by: INTERNAL MEDICINE

## 2023-03-27 PROCEDURE — 3072F PR LOW RISK FOR RETINOPATHY: ICD-10-PCS | Mod: CPTII,S$GLB,, | Performed by: INTERNAL MEDICINE

## 2023-03-27 PROCEDURE — 99999 PR PBB SHADOW E&M-EST. PATIENT-LVL IV: CPT | Mod: PBBFAC,,, | Performed by: INTERNAL MEDICINE

## 2023-03-27 PROCEDURE — 99396 PR PREVENTIVE VISIT,EST,40-64: ICD-10-PCS | Mod: S$GLB,,, | Performed by: INTERNAL MEDICINE

## 2023-03-27 PROCEDURE — 99999 PR PBB SHADOW E&M-EST. PATIENT-LVL IV: ICD-10-PCS | Mod: PBBFAC,,, | Performed by: INTERNAL MEDICINE

## 2023-03-27 PROCEDURE — 83036 HEMOGLOBIN GLYCOSYLATED A1C: CPT | Performed by: INTERNAL MEDICINE

## 2023-03-27 PROCEDURE — 3078F PR MOST RECENT DIASTOLIC BLOOD PRESSURE < 80 MM HG: ICD-10-PCS | Mod: CPTII,S$GLB,, | Performed by: INTERNAL MEDICINE

## 2023-03-27 PROCEDURE — 3008F BODY MASS INDEX DOCD: CPT | Mod: CPTII,S$GLB,, | Performed by: INTERNAL MEDICINE

## 2023-03-27 PROCEDURE — 99396 PREV VISIT EST AGE 40-64: CPT | Mod: S$GLB,,, | Performed by: INTERNAL MEDICINE

## 2023-03-27 PROCEDURE — 1160F RVW MEDS BY RX/DR IN RCRD: CPT | Mod: CPTII,S$GLB,, | Performed by: INTERNAL MEDICINE

## 2023-03-27 PROCEDURE — 3074F SYST BP LT 130 MM HG: CPT | Mod: CPTII,S$GLB,, | Performed by: INTERNAL MEDICINE

## 2023-03-27 PROCEDURE — 36415 COLL VENOUS BLD VENIPUNCTURE: CPT | Mod: PN | Performed by: INTERNAL MEDICINE

## 2023-03-27 PROCEDURE — 84439 ASSAY OF FREE THYROXINE: CPT | Performed by: INTERNAL MEDICINE

## 2023-03-27 PROCEDURE — 1159F PR MEDICATION LIST DOCUMENTED IN MEDICAL RECORD: ICD-10-PCS | Mod: CPTII,S$GLB,, | Performed by: INTERNAL MEDICINE

## 2023-03-27 NOTE — PROGRESS NOTES
Subjective:      Patient ID: Josefina Mark is a 43 y.o. female.    Chief Complaint: Annual Exam    Josefina was last seen by me on 1/9/202 virtual visit; last office visit in 2021. Thyroid level and Diabetes were uncontrolled three months ago due to inconsistent compliance with medication. She is back on track with daily dosing of Levothyroxine, Metformin and weekly Semaglutide. Feeling well, lost 24 pounds. Labs will be rechecked today.     She is doing well. Had some 'brain fog' starting ozempic but it has since disappeared and now is asymptomatic.     HYPOTHYROIDISM - TSH 6.367 (12/22)  T2DM - HBA1C 8.4 (12/22); taking metformin once daily; measuring BGL every morning when waking, occasional PM check    PSH: None    Mammogram 6/22. Eye Exam 6/22. Pap 10/21. Vaccines reviewed.    Social: Nonsmoker, no alcohol.  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 Mother has interstitial cystitis, a grandmother has diabetes and hypertension, paternal grandfather with prostate cancer, maternal grandmother with breast cancer at age 58, maternal aunt x2 with breast cancer, maternal grandfather with liver cancer, paternal aunt had colon cancer.      NKDA    Medications: Medications have been reviewed and reconciled.        Review of Systems   Constitutional:  Negative for fatigue and fever.   HENT:          Ear flaking skin   Eyes:  Negative for visual disturbance.   Respiratory:  Negative for choking and chest tightness.    Cardiovascular:  Negative for chest pain.   Gastrointestinal:  Negative for abdominal pain, blood in stool, constipation and diarrhea.   Endocrine: Negative for polydipsia, polyphagia and polyuria.   Genitourinary:  Negative for difficulty urinating, dysuria and hematuria.   Skin:  Negative for pallor.   Neurological:  Negative for dizziness, tremors, seizures, speech difficulty, weakness and headaches.   Psychiatric/Behavioral:  Negative for confusion. The patient is not  nervous/anxious.    Objective:   Physical Exam  Vitals reviewed.   Constitutional:       General: She is not in acute distress.     Appearance: Normal appearance. She is well-developed and normal weight. She is not ill-appearing, toxic-appearing or diaphoretic.   HENT:      Head: Normocephalic and atraumatic.      Right Ear: Tympanic membrane, ear canal and external ear normal. There is no impacted cerumen.      Left Ear: Tympanic membrane, ear canal and external ear normal. There is no impacted cerumen.      Nose: Nose normal. No congestion.      Mouth/Throat:      Mouth: Mucous membranes are moist.      Pharynx: Oropharynx is clear.   Eyes:      General: No scleral icterus.     Extraocular Movements: Extraocular movements intact.      Conjunctiva/sclera: Conjunctivae normal.      Right eye: Right conjunctiva is not injected.      Left eye: Left conjunctiva is not injected.      Pupils: Pupils are equal, round, and reactive to light.   Neck:      Thyroid: No thyromegaly.      Vascular: No carotid bruit or JVD.   Cardiovascular:      Rate and Rhythm: Normal rate and regular rhythm.      Pulses: Normal pulses.      Heart sounds: Normal heart sounds. No murmur heard.    No friction rub. No gallop.   Pulmonary:      Effort: Pulmonary effort is normal. No respiratory distress.      Breath sounds: Normal breath sounds. No wheezing, rhonchi or rales.   Abdominal:      General: Bowel sounds are normal. There is no distension.      Palpations: Abdomen is soft. There is no mass.      Tenderness: There is no abdominal tenderness.   Musculoskeletal:         General: No tenderness or deformity. Normal range of motion.      Cervical back: Normal range of motion and neck supple.      Right lower leg: No edema.      Left lower leg: No edema.      Comments: Protective Sensation:  Right: Intact  Left: Intact    Visual Inspection:  Normal -  Bilateral    Pedal Pulses:   Right: Present  Left: Present    Posterior Tibialis Pulses:  "  Right:Present  Left: Present       Lymphadenopathy:      Cervical: No cervical adenopathy.   Skin:     General: Skin is warm and dry.      Coloration: Skin is not pale.      Findings: No erythema or rash.      Nails: There is no clubbing.   Neurological:      General: No focal deficit present.      Mental Status: She is alert and oriented to person, place, and time.      Cranial Nerves: No cranial nerve deficit.      Motor: No abnormal muscle tone.      Coordination: Coordination normal.      Gait: Gait normal.      Deep Tendon Reflexes: Reflexes are normal and symmetric.   Psychiatric:         Mood and Affect: Mood normal.         Behavior: Behavior normal.         Thought Content: Thought content normal.         Judgment: Judgment normal.     Vitals:    03/27/23 0753   BP: 106/70   BP Location: Right arm   Patient Position: Sitting   Pulse: 88   Temp: 97.7 °F (36.5 °C)   TempSrc: Oral   SpO2: 98%   Weight: 80 kg (176 lb 5.9 oz)   Height: 5' 7" (1.702 m)       Assessment and Plan     Routine medical exam    Type 2 diabetes mellitus without complication, without long-term current use of insulin    -  Fasting Glucose running 's at home, much improved, repeat HbA1c with Urine Microalbumin today.    -  Eye exam due in June.      Hypothyroidism, unspecified type    -  Compliant with daily Levothyroxine, repeat TSH today.     Encounter for screening mammogram for breast cancer  -     Mammo Digital Screening Bilat w/ Terry; Future - due in June.     Need for COVID-19 vaccine - Bivalent Booster recommended.        "

## 2023-03-28 LAB
ESTIMATED AVG GLUCOSE: 114 MG/DL (ref 68–131)
HBA1C MFR BLD: 5.6 % (ref 4–5.6)

## 2023-03-31 ENCOUNTER — PATIENT MESSAGE (OUTPATIENT)
Dept: PRIMARY CARE CLINIC | Facility: CLINIC | Age: 43
End: 2023-03-31
Payer: COMMERCIAL

## 2023-03-31 DIAGNOSIS — E11.9 TYPE 2 DIABETES MELLITUS WITHOUT COMPLICATION, WITHOUT LONG-TERM CURRENT USE OF INSULIN: Primary | ICD-10-CM

## 2023-03-31 DIAGNOSIS — E11.65 UNCONTROLLED TYPE 2 DIABETES MELLITUS WITH HYPERGLYCEMIA, WITHOUT LONG-TERM CURRENT USE OF INSULIN: ICD-10-CM

## 2023-04-03 RX ORDER — SEMAGLUTIDE 1.34 MG/ML
0.5 INJECTION, SOLUTION SUBCUTANEOUS
Qty: 1 EACH | Refills: 0 | Status: CANCELLED | OUTPATIENT
Start: 2023-04-03

## 2023-04-03 NOTE — TELEPHONE ENCOUNTER
No new care gaps identified.  [facility] Embedded Care Gaps. Reference number: [id number]. 4/03/2023   8:55:10 AM CDT

## 2023-06-21 ENCOUNTER — TELEPHONE (OUTPATIENT)
Dept: PRIMARY CARE CLINIC | Facility: CLINIC | Age: 43
End: 2023-06-21
Payer: COMMERCIAL

## 2023-06-21 NOTE — TELEPHONE ENCOUNTER
----- Message from Nina Eller sent at 6/21/2023 10:16 AM CDT -----  Regarding: Order for mammo  Josefina Mark calling regarding Orders (message) Pt needs order put in the system to schedule her mammo.

## 2023-06-22 DIAGNOSIS — E03.9 HYPOTHYROIDISM, UNSPECIFIED TYPE: ICD-10-CM

## 2023-06-22 RX ORDER — LEVOTHYROXINE SODIUM 125 UG/1
125 TABLET ORAL DAILY
Qty: 90 TABLET | Refills: 0 | Status: CANCELLED | OUTPATIENT
Start: 2023-06-22

## 2023-06-22 NOTE — TELEPHONE ENCOUNTER
No care due was identified.  [facility] Embedded Care Due Messages. Reference number: [id number].   6/22/2023 5:01:39 PM CDT

## 2023-06-23 DIAGNOSIS — E03.9 HYPOTHYROIDISM, UNSPECIFIED TYPE: ICD-10-CM

## 2023-06-23 RX ORDER — LEVOTHYROXINE SODIUM 125 UG/1
125 TABLET ORAL DAILY
Qty: 90 TABLET | Refills: 0 | OUTPATIENT
Start: 2023-06-23

## 2023-06-23 NOTE — TELEPHONE ENCOUNTER
No care due was identified.  Health [facility] Embedded Care Due Messages. Reference number: [id number].   6/23/2023 9:15:41 AM CDT

## 2023-07-17 ENCOUNTER — HOSPITAL ENCOUNTER (OUTPATIENT)
Dept: RADIOLOGY | Facility: HOSPITAL | Age: 43
Discharge: HOME | End: 2023-07-17
Attending: INTERNAL MEDICINE
Payer: COMMERCIAL

## 2023-07-17 VITALS — WEIGHT: 155 LBS | BODY MASS INDEX: 24.28 KG/M2

## 2023-07-17 DIAGNOSIS — Z12.31 ENCOUNTER FOR SCREENING MAMMOGRAM FOR BREAST CANCER: ICD-10-CM

## 2023-07-17 PROCEDURE — 77067 SCR MAMMO BI INCL CAD: CPT | Mod: 26,,, | Performed by: RADIOLOGY

## 2023-07-17 PROCEDURE — 77067 MAMMO DIGITAL SCREENING BILAT WITH TOMO: ICD-10-PCS | Mod: 26,,, | Performed by: RADIOLOGY

## 2023-07-17 PROCEDURE — 77063 MAMMO DIGITAL SCREENING BILAT WITH TOMO: ICD-10-PCS | Mod: 26,,, | Performed by: RADIOLOGY

## 2023-07-17 PROCEDURE — 77063 BREAST TOMOSYNTHESIS BI: CPT | Mod: 26,,, | Performed by: RADIOLOGY

## 2023-07-17 PROCEDURE — 77067 SCR MAMMO BI INCL CAD: CPT | Mod: TC

## 2023-09-06 ENCOUNTER — PATIENT MESSAGE (OUTPATIENT)
Dept: PRIMARY CARE CLINIC | Facility: CLINIC | Age: 43
End: 2023-09-06
Payer: COMMERCIAL

## 2023-09-06 DIAGNOSIS — E11.9 TYPE 2 DIABETES MELLITUS WITHOUT COMPLICATION, WITHOUT LONG-TERM CURRENT USE OF INSULIN: Primary | ICD-10-CM

## 2023-09-06 DIAGNOSIS — E03.9 HYPOTHYROIDISM, UNSPECIFIED TYPE: ICD-10-CM

## 2023-09-09 DIAGNOSIS — E11.9 TYPE 2 DIABETES MELLITUS WITHOUT COMPLICATION, WITHOUT LONG-TERM CURRENT USE OF INSULIN: ICD-10-CM

## 2023-09-10 NOTE — TELEPHONE ENCOUNTER
Care Due:                  Date            Visit Type   Department     Provider  --------------------------------------------------------------------------------                                MYCHART                              ANNUAL                              CHECKUP/PHY  [facility]   Lida Zaira  Last Visit: 03-      S            CARE           Radha                              EP -                              PRIMARY      [facility]   Lida Zaira  Next Visit: 10-      CARE ([facility])   CARE           Radha                                                            Last  Test          Frequency    Reason                     Performed    Due Date  --------------------------------------------------------------------------------    HBA1C.......  6 months...  metFORMIN, semaglutide...  03- 09-    Health [facility] Embedded Care Due Messages. Reference number: [id number].   9/09/2023 7:57:45 PM CDT

## 2023-09-11 RX ORDER — SEMAGLUTIDE 1.34 MG/ML
1 INJECTION, SOLUTION SUBCUTANEOUS
Qty: 9 ML | Refills: 0 | Status: SHIPPED | OUTPATIENT
Start: 2023-09-11 | End: 2023-10-13 | Stop reason: SDUPTHER

## 2023-09-11 NOTE — TELEPHONE ENCOUNTER
Provider Staff:  Action required for this patient     Please see care gap opportunities below in Care Due Message.    Thanks!  [facility]     Appointments      Date Provider   Last Visit   3/27/2023 Lida Garcia MD   Next Visit   10/13/2023 Lida Garcia MD     Refill Decision Note   Josefina Mark  is requesting a refill authorization.  Brief Assessment and Rationale for Refill:  Approve     Medication Therapy Plan:         Comments:     Note composed:11:11 AM 09/11/2023

## 2023-09-13 DIAGNOSIS — E03.9 HYPOTHYROIDISM, UNSPECIFIED TYPE: ICD-10-CM

## 2023-09-13 RX ORDER — LEVOTHYROXINE SODIUM 125 UG/1
125 TABLET ORAL DAILY
Qty: 30 TABLET | Refills: 0 | Status: SHIPPED | OUTPATIENT
Start: 2023-09-13 | End: 2023-10-05 | Stop reason: SDUPTHER

## 2023-09-13 NOTE — TELEPHONE ENCOUNTER
No care due was identified.  [facility] Embedded Care Due Messages. Reference number: [id number].   9/13/2023 10:59:18 AM CDT

## 2023-10-05 ENCOUNTER — PATIENT MESSAGE (OUTPATIENT)
Dept: PRIMARY CARE CLINIC | Facility: CLINIC | Age: 43
End: 2023-10-05
Payer: COMMERCIAL

## 2023-10-05 ENCOUNTER — LAB VISIT (OUTPATIENT)
Dept: LAB | Facility: HOSPITAL | Age: 43
End: 2023-10-05
Attending: INTERNAL MEDICINE
Payer: COMMERCIAL

## 2023-10-05 DIAGNOSIS — E11.9 TYPE 2 DIABETES MELLITUS WITHOUT COMPLICATION, WITHOUT LONG-TERM CURRENT USE OF INSULIN: ICD-10-CM

## 2023-10-05 DIAGNOSIS — E03.9 HYPOTHYROIDISM, UNSPECIFIED TYPE: ICD-10-CM

## 2023-10-05 DIAGNOSIS — E87.1 HYPONATREMIA: Primary | ICD-10-CM

## 2023-10-05 LAB
ALBUMIN SERPL BCP-MCNC: 4.1 G/DL (ref 3.5–5.2)
ALP SERPL-CCNC: 38 U/L (ref 55–135)
ALT SERPL W/O P-5'-P-CCNC: 12 U/L (ref 10–44)
ANION GAP SERPL CALC-SCNC: 7 MMOL/L (ref 8–16)
AST SERPL-CCNC: 12 U/L (ref 10–40)
BILIRUB SERPL-MCNC: 0.4 MG/DL (ref 0.1–1)
BUN SERPL-MCNC: 13 MG/DL (ref 6–20)
CALCIUM SERPL-MCNC: 9.3 MG/DL (ref 8.7–10.5)
CHLORIDE SERPL-SCNC: 102 MMOL/L (ref 95–110)
CHOLEST SERPL-MCNC: 147 MG/DL (ref 120–199)
CHOLEST/HDLC SERPL: 2.4 {RATIO} (ref 2–5)
CO2 SERPL-SCNC: 24 MMOL/L (ref 23–29)
CREAT SERPL-MCNC: 0.7 MG/DL (ref 0.5–1.4)
EST. GFR  (NO RACE VARIABLE): >60 ML/MIN/1.73 M^2
ESTIMATED AVG GLUCOSE: 111 MG/DL (ref 68–131)
GLUCOSE SERPL-MCNC: 97 MG/DL (ref 70–110)
HBA1C MFR BLD: 5.5 % (ref 4–5.6)
HDLC SERPL-MCNC: 61 MG/DL (ref 40–75)
HDLC SERPL: 41.5 % (ref 20–50)
LDLC SERPL CALC-MCNC: 75.4 MG/DL (ref 63–159)
NONHDLC SERPL-MCNC: 86 MG/DL
POTASSIUM SERPL-SCNC: 4.7 MMOL/L (ref 3.5–5.1)
PROT SERPL-MCNC: 7.3 G/DL (ref 6–8.4)
SODIUM SERPL-SCNC: 133 MMOL/L (ref 136–145)
T4 FREE SERPL-MCNC: 1.05 NG/DL (ref 0.71–1.51)
TRIGL SERPL-MCNC: 53 MG/DL (ref 30–150)
TSH SERPL DL<=0.005 MIU/L-ACNC: 0.12 UIU/ML (ref 0.4–4)

## 2023-10-05 PROCEDURE — 80053 COMPREHEN METABOLIC PANEL: CPT | Performed by: INTERNAL MEDICINE

## 2023-10-05 PROCEDURE — 80061 LIPID PANEL: CPT | Performed by: INTERNAL MEDICINE

## 2023-10-05 PROCEDURE — 36415 COLL VENOUS BLD VENIPUNCTURE: CPT | Performed by: INTERNAL MEDICINE

## 2023-10-05 PROCEDURE — 84439 ASSAY OF FREE THYROXINE: CPT | Performed by: INTERNAL MEDICINE

## 2023-10-05 PROCEDURE — 83036 HEMOGLOBIN GLYCOSYLATED A1C: CPT | Performed by: INTERNAL MEDICINE

## 2023-10-05 PROCEDURE — 84443 ASSAY THYROID STIM HORMONE: CPT | Performed by: INTERNAL MEDICINE

## 2023-10-05 RX ORDER — LEVOTHYROXINE SODIUM 112 UG/1
112 TABLET ORAL
Qty: 30 TABLET | Refills: 0 | Status: SHIPPED | OUTPATIENT
Start: 2023-10-05 | End: 2023-11-03

## 2023-10-05 NOTE — TELEPHONE ENCOUNTER
While covering for her PCP her TSH was low.  Dose of Synthroid reduced from 125 mcg to 112 mcg.  Repeat TSH ordered and patient was counseled to follow up with her PCP for this and her low sodium of 133.  Per chart review, sodium is chronically low as well.

## 2023-10-09 ENCOUNTER — PATIENT MESSAGE (OUTPATIENT)
Dept: PRIMARY CARE CLINIC | Facility: CLINIC | Age: 43
End: 2023-10-09
Payer: COMMERCIAL

## 2023-10-13 ENCOUNTER — OFFICE VISIT (OUTPATIENT)
Dept: PRIMARY CARE CLINIC | Facility: CLINIC | Age: 43
End: 2023-10-13
Payer: COMMERCIAL

## 2023-10-13 VITALS
HEIGHT: 67 IN | TEMPERATURE: 98 F | HEART RATE: 78 BPM | OXYGEN SATURATION: 96 % | BODY MASS INDEX: 23.53 KG/M2 | DIASTOLIC BLOOD PRESSURE: 68 MMHG | WEIGHT: 149.94 LBS | SYSTOLIC BLOOD PRESSURE: 100 MMHG | RESPIRATION RATE: 20 BRPM

## 2023-10-13 DIAGNOSIS — E11.9 TYPE 2 DIABETES MELLITUS WITHOUT COMPLICATION, WITHOUT LONG-TERM CURRENT USE OF INSULIN: Primary | ICD-10-CM

## 2023-10-13 DIAGNOSIS — E03.9 HYPOTHYROIDISM, UNSPECIFIED TYPE: ICD-10-CM

## 2023-10-13 PROCEDURE — 3074F SYST BP LT 130 MM HG: CPT | Mod: CPTII,S$GLB,, | Performed by: INTERNAL MEDICINE

## 2023-10-13 PROCEDURE — 3074F PR MOST RECENT SYSTOLIC BLOOD PRESSURE < 130 MM HG: ICD-10-PCS | Mod: CPTII,S$GLB,, | Performed by: INTERNAL MEDICINE

## 2023-10-13 PROCEDURE — 3078F DIAST BP <80 MM HG: CPT | Mod: CPTII,S$GLB,, | Performed by: INTERNAL MEDICINE

## 2023-10-13 PROCEDURE — 1159F MED LIST DOCD IN RCRD: CPT | Mod: CPTII,S$GLB,, | Performed by: INTERNAL MEDICINE

## 2023-10-13 PROCEDURE — 3061F NEG MICROALBUMINURIA REV: CPT | Mod: CPTII,S$GLB,, | Performed by: INTERNAL MEDICINE

## 2023-10-13 PROCEDURE — 99214 PR OFFICE/OUTPT VISIT, EST, LEVL IV, 30-39 MIN: ICD-10-PCS | Mod: S$GLB,,, | Performed by: INTERNAL MEDICINE

## 2023-10-13 PROCEDURE — 99999 PR PBB SHADOW E&M-EST. PATIENT-LVL III: CPT | Mod: PBBFAC,,, | Performed by: INTERNAL MEDICINE

## 2023-10-13 PROCEDURE — 3061F PR NEG MICROALBUMINURIA RESULT DOCUMENTED/REVIEW: ICD-10-PCS | Mod: CPTII,S$GLB,, | Performed by: INTERNAL MEDICINE

## 2023-10-13 PROCEDURE — 3066F PR DOCUMENTATION OF TREATMENT FOR NEPHROPATHY: ICD-10-PCS | Mod: CPTII,S$GLB,, | Performed by: INTERNAL MEDICINE

## 2023-10-13 PROCEDURE — 3072F LOW RISK FOR RETINOPATHY: CPT | Mod: CPTII,S$GLB,, | Performed by: INTERNAL MEDICINE

## 2023-10-13 PROCEDURE — 1159F PR MEDICATION LIST DOCUMENTED IN MEDICAL RECORD: ICD-10-PCS | Mod: CPTII,S$GLB,, | Performed by: INTERNAL MEDICINE

## 2023-10-13 PROCEDURE — 1160F RVW MEDS BY RX/DR IN RCRD: CPT | Mod: CPTII,S$GLB,, | Performed by: INTERNAL MEDICINE

## 2023-10-13 PROCEDURE — 3072F PR LOW RISK FOR RETINOPATHY: ICD-10-PCS | Mod: CPTII,S$GLB,, | Performed by: INTERNAL MEDICINE

## 2023-10-13 PROCEDURE — 3044F HG A1C LEVEL LT 7.0%: CPT | Mod: CPTII,S$GLB,, | Performed by: INTERNAL MEDICINE

## 2023-10-13 PROCEDURE — 99999 PR PBB SHADOW E&M-EST. PATIENT-LVL III: ICD-10-PCS | Mod: PBBFAC,,, | Performed by: INTERNAL MEDICINE

## 2023-10-13 PROCEDURE — 99214 OFFICE O/P EST MOD 30 MIN: CPT | Mod: S$GLB,,, | Performed by: INTERNAL MEDICINE

## 2023-10-13 PROCEDURE — 1160F PR REVIEW ALL MEDS BY PRESCRIBER/CLIN PHARMACIST DOCUMENTED: ICD-10-PCS | Mod: CPTII,S$GLB,, | Performed by: INTERNAL MEDICINE

## 2023-10-13 PROCEDURE — 3008F BODY MASS INDEX DOCD: CPT | Mod: CPTII,S$GLB,, | Performed by: INTERNAL MEDICINE

## 2023-10-13 PROCEDURE — 3044F PR MOST RECENT HEMOGLOBIN A1C LEVEL <7.0%: ICD-10-PCS | Mod: CPTII,S$GLB,, | Performed by: INTERNAL MEDICINE

## 2023-10-13 PROCEDURE — 3078F PR MOST RECENT DIASTOLIC BLOOD PRESSURE < 80 MM HG: ICD-10-PCS | Mod: CPTII,S$GLB,, | Performed by: INTERNAL MEDICINE

## 2023-10-13 PROCEDURE — 3066F NEPHROPATHY DOC TX: CPT | Mod: CPTII,S$GLB,, | Performed by: INTERNAL MEDICINE

## 2023-10-13 PROCEDURE — 3008F PR BODY MASS INDEX (BMI) DOCUMENTED: ICD-10-PCS | Mod: CPTII,S$GLB,, | Performed by: INTERNAL MEDICINE

## 2023-10-13 RX ORDER — SEMAGLUTIDE 1.34 MG/ML
1 INJECTION, SOLUTION SUBCUTANEOUS
Qty: 9 ML | Refills: 1 | Status: SHIPPED | OUTPATIENT
Start: 2023-10-13

## 2023-10-13 RX ORDER — METFORMIN HYDROCHLORIDE 500 MG/1
1000 TABLET, EXTENDED RELEASE ORAL DAILY
Qty: 180 TABLET | Refills: 1 | Status: SHIPPED | OUTPATIENT
Start: 2023-10-13

## 2023-10-13 NOTE — PROGRESS NOTES
"Subjective     Patient ID: Josefina Mark is a 43 y.o. female.    Chief Complaint: Follow-up    Last seen 6.5 months ago for annual physical. Returns for follow up. Labs in advance show diabetes very well controlled on Metformin with Ozempic, and she has lost a lot of weight. Currently feeling well aside from light dry cough with tickle in throat x few days. No fever, chills, headache, body aches, nasal congestion or rhinorrhea, throat pain, chest discomfort, N/V or diarrhea.     PMH: .   Diabetes Type 2.  Hypothyroidism.  Vitamin D insufficiency.   Obesity.  Nephrolithiasis.  Mild Hyponatremia.    Pap 10/21, Mammogram normal  Eye exam . Vaccines reviewed.     PSH: None.     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     [facility]: Lung cancer in father. Mother has interstitial cystitis, a grandmother has diabetes and hypertension, paternal grandfather with prostate cancer, maternal grandmother with breast cancer at age 58, maternal aunt x2 with breast cancer, maternal grandfather with liver cancer, paternal aunt had colon cancer.      NKDA.    Medications: list reviewed and reconciled. She only takes 2 tabs of Metformin 500 daily.                  Review of Systems   All other systems reviewed and are negative.         Objective   Vitals:    10/13/23 0806   BP: 100/68   BP Location: Right arm   Patient Position: Sitting   Pulse: 78   Resp: 20   Temp: 98.1 °F (36.7 °C)   TempSrc: Oral   SpO2: 96%   Weight: 68 kg (149 lb 14.6 oz)    From 200 one year ago.   Height: 5' 7" (1.702 m)      Physical Exam  Constitutional:       General: She is not in acute distress.     Appearance: She is not ill-appearing.      Comments: No cough during the encounter.   HENT:      Head: Normocephalic and atraumatic.      Right Ear: Tympanic membrane, ear canal and external ear normal.      Left Ear: Tympanic membrane, ear canal and external ear normal.      Nose: Nose normal. No congestion or " rhinorrhea.      Mouth/Throat:      Mouth: Mucous membranes are moist.      Pharynx: Oropharynx is clear. No oropharyngeal exudate or posterior oropharyngeal erythema.   Eyes:      General:         Right eye: No discharge.         Left eye: No discharge.      Conjunctiva/sclera: Conjunctivae normal.   Cardiovascular:      Rate and Rhythm: Normal rate and regular rhythm.   Pulmonary:      Effort: Pulmonary effort is normal. No respiratory distress.      Breath sounds: Normal breath sounds. No stridor. No wheezing, rhonchi or rales.   Musculoskeletal:      Cervical back: Normal range of motion and neck supple.   Lymphadenopathy:      Cervical: No cervical adenopathy.   Skin:     General: Skin is warm and dry.   Neurological:      Mental Status: She is alert.   Psychiatric:         Mood and Affect: Mood normal.         Behavior: Behavior normal.     Lab Visit on 10/05/2023   Component Date Value    Sodium 10/05/2023 133 (L)     Potassium 10/05/2023 4.7     Chloride 10/05/2023 102     CO2 10/05/2023 24     Glucose 10/05/2023 97     BUN 10/05/2023 13     Creatinine 10/05/2023 0.7     Calcium 10/05/2023 9.3     Total Protein 10/05/2023 7.3     Albumin 10/05/2023 4.1     Total Bilirubin 10/05/2023 0.4     Alkaline Phosphatase 10/05/2023 38 (L)     AST 10/05/2023 12     ALT 10/05/2023 12     eGFR 10/05/2023 >60.0     Anion Gap 10/05/2023 7 (L)     Cholesterol 10/05/2023 147     Triglycerides 10/05/2023 53     HDL 10/05/2023 61     LDL Cholesterol 10/05/2023 75.4     HDL/Cholesterol Ratio 10/05/2023 41.5     Total Cholesterol/HDL Ra* 10/05/2023 2.4     Non-HDL Cholesterol 10/05/2023 86     Hemoglobin A1C 10/05/2023 5.5     Estimated Avg Glucose 10/05/2023 111     TSH 10/05/2023 0.118 (L)     Free T4 10/05/2023 1.05         Assessment and Plan     1. Type 2 diabetes mellitus without complication, without long-term current use of insulin - well controlled.  -     semaglutide (OZEMPIC) 1 mg/dose (4  mg/3 mL); Inject 1 mg into the skin every 7 days.  Dispense: 9 mL; Refill: 1  -     metFORMIN (GLUCOPHAGE-XR) 500 MG ER 24hr tablet; Take 2 tablets (1,000 mg total) by mouth once daily.  Dispense: 180 tablet; Refill: 1  -     Eye exam to be scheduled by patient.     2. Hypothyroidism, unspecified type        -    Levothyroxine has been decreased from 125 to 112 mcg daily, will recheck TSH in one month.     New COVID booster recommended.        Follow up in about 6 months (around 4/13/2024) for Annual Physical. .

## 2023-11-03 ENCOUNTER — LAB VISIT (OUTPATIENT)
Dept: LAB | Facility: HOSPITAL | Age: 43
End: 2023-11-03
Payer: COMMERCIAL

## 2023-11-03 DIAGNOSIS — E03.9 HYPOTHYROIDISM, UNSPECIFIED TYPE: ICD-10-CM

## 2023-11-03 DIAGNOSIS — E87.1 HYPONATREMIA: ICD-10-CM

## 2023-11-03 LAB
ANION GAP SERPL CALC-SCNC: 7 MMOL/L (ref 8–16)
BUN SERPL-MCNC: 13 MG/DL (ref 6–20)
CALCIUM SERPL-MCNC: 9.2 MG/DL (ref 8.7–10.5)
CHLORIDE SERPL-SCNC: 103 MMOL/L (ref 95–110)
CO2 SERPL-SCNC: 26 MMOL/L (ref 23–29)
CREAT SERPL-MCNC: 0.7 MG/DL (ref 0.5–1.4)
EST. GFR  (NO RACE VARIABLE): >60 ML/MIN/1.73 M^2
GLUCOSE SERPL-MCNC: 97 MG/DL (ref 70–110)
POTASSIUM SERPL-SCNC: 4.5 MMOL/L (ref 3.5–5.1)
SODIUM SERPL-SCNC: 136 MMOL/L (ref 136–145)
T4 FREE SERPL-MCNC: 1.08 NG/DL (ref 0.71–1.51)
TSH SERPL DL<=0.005 MIU/L-ACNC: 0.28 UIU/ML (ref 0.4–4)

## 2023-11-03 PROCEDURE — 84439 ASSAY OF FREE THYROXINE: CPT | Performed by: STUDENT IN AN ORGANIZED HEALTH CARE EDUCATION/TRAINING PROGRAM

## 2023-11-03 PROCEDURE — 80048 BASIC METABOLIC PNL TOTAL CA: CPT | Performed by: STUDENT IN AN ORGANIZED HEALTH CARE EDUCATION/TRAINING PROGRAM

## 2023-11-03 PROCEDURE — 84443 ASSAY THYROID STIM HORMONE: CPT | Performed by: STUDENT IN AN ORGANIZED HEALTH CARE EDUCATION/TRAINING PROGRAM

## 2023-11-03 PROCEDURE — 36415 COLL VENOUS BLD VENIPUNCTURE: CPT | Performed by: STUDENT IN AN ORGANIZED HEALTH CARE EDUCATION/TRAINING PROGRAM

## 2023-11-03 RX ORDER — LEVOTHYROXINE SODIUM 100 UG/1
100 TABLET ORAL
Qty: 30 TABLET | Refills: 0 | Status: SHIPPED | OUTPATIENT
Start: 2023-11-03 | End: 2023-12-04 | Stop reason: SDUPTHER

## 2023-11-03 NOTE — PROGRESS NOTES
Hi, her TSH is still low.  I will call in a lower dose of Synthroid. She was at 112 so I will go to 100 mcg and put in a repeat.

## 2023-12-04 DIAGNOSIS — E03.9 HYPOTHYROIDISM, UNSPECIFIED TYPE: ICD-10-CM

## 2023-12-04 RX ORDER — LEVOTHYROXINE SODIUM 100 UG/1
100 TABLET ORAL
Qty: 30 TABLET | Refills: 0 | Status: SHIPPED | OUTPATIENT
Start: 2023-12-04 | End: 2024-01-03 | Stop reason: SDUPTHER

## 2023-12-04 NOTE — TELEPHONE ENCOUNTER
No care due was identified.  Health [facility] Embedded Care Due Messages. Reference number: [id number].   12/04/2023 10:33:52 AM CST

## 2024-01-02 ENCOUNTER — LAB VISIT (OUTPATIENT)
Dept: LAB | Facility: HOSPITAL | Age: 44
End: 2024-01-02
Payer: COMMERCIAL

## 2024-01-02 DIAGNOSIS — E03.9 HYPOTHYROIDISM, UNSPECIFIED TYPE: ICD-10-CM

## 2024-01-02 LAB — TSH SERPL DL<=0.005 MIU/L-ACNC: 2.43 UIU/ML (ref 0.4–4)

## 2024-01-02 PROCEDURE — 36415 COLL VENOUS BLD VENIPUNCTURE: CPT | Performed by: INTERNAL MEDICINE

## 2024-01-02 PROCEDURE — 84443 ASSAY THYROID STIM HORMONE: CPT | Performed by: INTERNAL MEDICINE

## 2024-01-03 ENCOUNTER — PATIENT MESSAGE (OUTPATIENT)
Dept: PRIMARY CARE CLINIC | Facility: CLINIC | Age: 44
End: 2024-01-03
Payer: COMMERCIAL

## 2024-01-03 DIAGNOSIS — E03.9 HYPOTHYROIDISM, UNSPECIFIED TYPE: ICD-10-CM

## 2024-01-03 RX ORDER — LEVOTHYROXINE SODIUM 100 UG/1
100 TABLET ORAL
Qty: 90 TABLET | Refills: 2 | Status: SHIPPED | OUTPATIENT
Start: 2024-01-03

## 2024-01-23 ENCOUNTER — CLINICAL SUPPORT (OUTPATIENT)
Dept: OTHER | Facility: CLINIC | Age: 44
End: 2024-01-23
Payer: COMMERCIAL

## 2024-01-23 DIAGNOSIS — Z00.8 ENCOUNTER FOR OTHER GENERAL EXAMINATION: ICD-10-CM

## 2024-01-24 VITALS
HEIGHT: 66 IN | WEIGHT: 152 LBS | DIASTOLIC BLOOD PRESSURE: 72 MMHG | SYSTOLIC BLOOD PRESSURE: 103 MMHG | BODY MASS INDEX: 24.43 KG/M2

## 2024-01-24 LAB
GLUCOSE SERPL-MCNC: 84 MG/DL (ref 60–140)
HDLC SERPL-MCNC: 71 MG/DL
POC CHOLESTEROL, LDL (DOCK): 92 MG/DL
POC CHOLESTEROL, TOTAL: 177 MG/DL
TRIGL SERPL-MCNC: 75 MG/DL

## 2024-03-28 ENCOUNTER — PATIENT OUTREACH (OUTPATIENT)
Dept: ADMINISTRATIVE | Facility: HOSPITAL | Age: 44
End: 2024-03-28
Payer: COMMERCIAL

## 2024-03-28 NOTE — PROGRESS NOTES
Health Maintenance Due   Topic Date Due    Pneumococcal Vaccines (Age 0-64) (2 of 2 - PCV) 11/14/2020    Eye Exam  06/07/2023    COVID-19 Vaccine (4 - 2023-24 season) 09/01/2023    Colorectal Cancer Screening  Never done    Foot Exam  03/27/2024    Diabetes Urine Screening  03/27/2024    Hemoglobin A1c  04/05/2024        Chart review done.   HM updated.   Immunizations reviewed & updated.   Care Everywhere updated.

## 2024-04-11 ENCOUNTER — LAB VISIT (OUTPATIENT)
Dept: LAB | Facility: HOSPITAL | Age: 44
End: 2024-04-11
Attending: INTERNAL MEDICINE
Payer: COMMERCIAL

## 2024-04-11 ENCOUNTER — OFFICE VISIT (OUTPATIENT)
Dept: PRIMARY CARE CLINIC | Facility: CLINIC | Age: 44
End: 2024-04-11
Payer: COMMERCIAL

## 2024-04-11 VITALS
HEART RATE: 75 BPM | OXYGEN SATURATION: 98 % | DIASTOLIC BLOOD PRESSURE: 66 MMHG | WEIGHT: 163.13 LBS | TEMPERATURE: 98 F | BODY MASS INDEX: 26.22 KG/M2 | HEIGHT: 66 IN | SYSTOLIC BLOOD PRESSURE: 112 MMHG

## 2024-04-11 DIAGNOSIS — Z12.31 ENCOUNTER FOR SCREENING MAMMOGRAM FOR BREAST CANCER: ICD-10-CM

## 2024-04-11 DIAGNOSIS — E11.9 TYPE 2 DIABETES MELLITUS WITHOUT COMPLICATION, WITHOUT LONG-TERM CURRENT USE OF INSULIN: ICD-10-CM

## 2024-04-11 DIAGNOSIS — E03.9 HYPOTHYROIDISM, UNSPECIFIED TYPE: ICD-10-CM

## 2024-04-11 DIAGNOSIS — Z00.00 ROUTINE MEDICAL EXAM: ICD-10-CM

## 2024-04-11 DIAGNOSIS — Z12.12 SCREENING FOR COLORECTAL CANCER: ICD-10-CM

## 2024-04-11 DIAGNOSIS — Z00.00 ROUTINE MEDICAL EXAM: Primary | ICD-10-CM

## 2024-04-11 DIAGNOSIS — Z12.11 SCREENING FOR COLORECTAL CANCER: ICD-10-CM

## 2024-04-11 LAB
ALBUMIN SERPL BCP-MCNC: 4.2 G/DL (ref 3.5–5.2)
ALBUMIN/CREAT UR: NORMAL UG/MG (ref 0–30)
ALP SERPL-CCNC: 44 U/L (ref 55–135)
ALT SERPL W/O P-5'-P-CCNC: 16 U/L (ref 10–44)
ANION GAP SERPL CALC-SCNC: 8 MMOL/L (ref 8–16)
AST SERPL-CCNC: 15 U/L (ref 10–40)
BILIRUB SERPL-MCNC: 0.5 MG/DL (ref 0.1–1)
BUN SERPL-MCNC: 14 MG/DL (ref 6–20)
CALCIUM SERPL-MCNC: 9.4 MG/DL (ref 8.7–10.5)
CHLORIDE SERPL-SCNC: 102 MMOL/L (ref 95–110)
CHOLEST SERPL-MCNC: 159 MG/DL (ref 120–199)
CHOLEST/HDLC SERPL: 2.3 {RATIO} (ref 2–5)
CO2 SERPL-SCNC: 25 MMOL/L (ref 23–29)
CREAT SERPL-MCNC: 0.7 MG/DL (ref 0.5–1.4)
CREAT UR-MCNC: 51 MG/DL (ref 15–325)
ERYTHROCYTE [DISTWIDTH] IN BLOOD BY AUTOMATED COUNT: 13.1 % (ref 11.5–14.5)
EST. GFR  (NO RACE VARIABLE): >60 ML/MIN/1.73 M^2
ESTIMATED AVG GLUCOSE: 117 MG/DL (ref 68–131)
GLUCOSE SERPL-MCNC: 112 MG/DL (ref 70–110)
HBA1C MFR BLD: 5.7 % (ref 4–5.6)
HCT VFR BLD AUTO: 38.9 % (ref 37–48.5)
HDLC SERPL-MCNC: 68 MG/DL (ref 40–75)
HDLC SERPL: 42.8 % (ref 20–50)
HGB BLD-MCNC: 12.3 G/DL (ref 12–16)
LDLC SERPL CALC-MCNC: 79.6 MG/DL (ref 63–159)
MCH RBC QN AUTO: 28.2 PG (ref 27–31)
MCHC RBC AUTO-ENTMCNC: 31.6 G/DL (ref 32–36)
MCV RBC AUTO: 89 FL (ref 82–98)
MICROALBUMIN UR DL<=1MG/L-MCNC: <5 UG/ML
NONHDLC SERPL-MCNC: 91 MG/DL
PLATELET # BLD AUTO: 294 K/UL (ref 150–450)
PMV BLD AUTO: 10.6 FL (ref 9.2–12.9)
POTASSIUM SERPL-SCNC: 4.6 MMOL/L (ref 3.5–5.1)
PROT SERPL-MCNC: 7.7 G/DL (ref 6–8.4)
RBC # BLD AUTO: 4.36 M/UL (ref 4–5.4)
SODIUM SERPL-SCNC: 135 MMOL/L (ref 136–145)
TRIGL SERPL-MCNC: 57 MG/DL (ref 30–150)
TSH SERPL DL<=0.005 MIU/L-ACNC: 2.59 UIU/ML (ref 0.4–4)
WBC # BLD AUTO: 6.62 K/UL (ref 3.9–12.7)

## 2024-04-11 PROCEDURE — 1159F MED LIST DOCD IN RCRD: CPT | Mod: CPTII,S$GLB,, | Performed by: INTERNAL MEDICINE

## 2024-04-11 PROCEDURE — 3074F SYST BP LT 130 MM HG: CPT | Mod: CPTII,S$GLB,, | Performed by: INTERNAL MEDICINE

## 2024-04-11 PROCEDURE — 80061 LIPID PANEL: CPT | Performed by: INTERNAL MEDICINE

## 2024-04-11 PROCEDURE — 3078F DIAST BP <80 MM HG: CPT | Mod: CPTII,S$GLB,, | Performed by: INTERNAL MEDICINE

## 2024-04-11 PROCEDURE — 99396 PREV VISIT EST AGE 40-64: CPT | Mod: S$GLB,,, | Performed by: INTERNAL MEDICINE

## 2024-04-11 PROCEDURE — 36415 COLL VENOUS BLD VENIPUNCTURE: CPT | Mod: PN | Performed by: INTERNAL MEDICINE

## 2024-04-11 PROCEDURE — 85027 COMPLETE CBC AUTOMATED: CPT | Performed by: INTERNAL MEDICINE

## 2024-04-11 PROCEDURE — 82043 UR ALBUMIN QUANTITATIVE: CPT | Performed by: INTERNAL MEDICINE

## 2024-04-11 PROCEDURE — 83036 HEMOGLOBIN GLYCOSYLATED A1C: CPT | Performed by: INTERNAL MEDICINE

## 2024-04-11 PROCEDURE — 1160F RVW MEDS BY RX/DR IN RCRD: CPT | Mod: CPTII,S$GLB,, | Performed by: INTERNAL MEDICINE

## 2024-04-11 PROCEDURE — 3008F BODY MASS INDEX DOCD: CPT | Mod: CPTII,S$GLB,, | Performed by: INTERNAL MEDICINE

## 2024-04-11 PROCEDURE — 80053 COMPREHEN METABOLIC PANEL: CPT | Performed by: INTERNAL MEDICINE

## 2024-04-11 PROCEDURE — 99999 PR PBB SHADOW E&M-EST. PATIENT-LVL IV: CPT | Mod: PBBFAC,,, | Performed by: INTERNAL MEDICINE

## 2024-04-11 PROCEDURE — 84443 ASSAY THYROID STIM HORMONE: CPT | Performed by: INTERNAL MEDICINE

## 2024-04-11 NOTE — PROGRESS NOTES
OT ACUTE Treatment Session    Pt seen on 11 nursing unit. Frequency Comments: M T Th F     Admitting complaint[de-identified] Hyperkalemia [E87.5]  Uremia [N19]                                                                                         Precautions  Other Precautions: contact isolation (02/05/18 1500)  Precautions Comments: fall risk (02/05/18 1500)      ASSESSMENT:  Treatment today focused on progression of self cares, transfer training, endurance building. Pt back from  run this AM, agreeable to session, and requesting to use bathroom to have BM. Current overall ADL status is supervision/set up to mod assist for toileting, garry cares in standing, UE dressing tasks, and grooming. Current functional mobility for ADL and Instrumental-ADL tasks is min/mod assist of one for sit/stand, pivot transfers with Zollie Shin. After use of commode pt assisted over to recliner for continued functional tasks. However upon asking pt to stand to remove Stedy seat pt noted to have right arm tremor and shakes. He became increasing lethargic and then had episode of unresponsiveness. RN called into room STAT and STAT team activated. Total assist of three staff members to assist pt off Zollie Shin and safely back to bed for nursing assessment. Pt left in bed with stable vitals and multiple nurses at side including STAT RN assessing situation. Pt at which point was responding and oriented x3. Continued skilled OT to address deficits noted and maximize self cares/functional mobility. Anticipate post acute, AYLA, when medically stable.       RECOMMENDATIONS FOR DISCHARGE:  Recommendations for Discharge: OT: Post acute therapy;Sub-acute nursing home (02/05/18 1500)    OT Identified Barriers to Discharge: medical, endurance, balance, safety, cognition      PT/OT Mobility Equipment for Discharge: no needs anticipated (02/02/18 0830)  PT/OT ADL Equipment for Discharge: will monitor Subjective     Patient ID: Josefina Mark is a 44 y.o. female.    Chief Complaint: Follow-up    Last seen 6.5 months ago. Returns for annual physical. No complaints, feeling well. Taking daily meds as prescribed, no adverse effects. No home glucose monitoring reported.     PMH: .   Diabetes Type 2. HbA1c 5.5% Oct. '23.   Hypothyroidism. TSH 2.43  after med adjusted for low TSH.   Vitamin D insufficiency.   Nephrolithiasis.    Pap 10/21, Mammogram normal . Eye exam . Vaccines reviewed.     PSH: None.     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     FMH: Lung cancer in father. Mother has interstitial cystitis, a grandmother has diabetes and hypertension, paternal grandfather with prostate cancer, maternal grandmother with breast cancer at age 58, maternal aunt x2 with breast cancer, maternal grandfather with liver cancer, paternal aunt had colon cancer.      NKDA.    Medications: list reviewed and reconciled.                   Review of Systems   Constitutional:  Negative for activity change, appetite change, fatigue, fever and unexpected weight change.   HENT:  Negative for nasal congestion, ear pain, hearing loss, rhinorrhea, sneezing, sore throat, trouble swallowing and voice change.    Eyes:  Negative for pain and visual disturbance.   Respiratory:  Negative for cough, chest tightness, shortness of breath and wheezing.    Cardiovascular:  Negative for chest pain, palpitations and leg swelling.   Gastrointestinal:  Negative for abdominal pain, blood in stool, constipation, diarrhea, nausea and vomiting.   Genitourinary:  Negative for bladder incontinence, dysuria, frequency, menstrual problem, pelvic pain and urgency.   Musculoskeletal:  Negative for arthralgias, gait problem, joint swelling and myalgias.   Integumentary:  Negative for color change and rash.   Neurological:  Negative for dizziness, syncope, facial asymmetry, speech difficulty, weakness,  "(02/05/18 1500)           PLAN: Continue skilled OT, including the following Treatment Interventions: ADL retraining;Functional transfer training;UE strengthening/ROM; Endurance training;Cognitive reorientation;Patient/Family training;Equipment eval/education; Compensatory technique education (02/05/18 1500)   Treatment Plan for Next Session: eob ADLs, UE exercises, commode transfers          EDUCATION:   On this date, the patient was educated on role of OT, POC, ADL techniques, transfer techniques, and safety. The response to education was: Christi Cotton understanding, Demonstrates understanding and Needs reinforcement                                                    SUBJECTIVE: Patient's Personal Goal: did not state (02/05/18 1500)   Subjective: ""I need to use the bathroom\"" (02/05/18 1500)  Subjective/Objective Comments: agreeable to session; returns to bed at end with needs at hand and STAT team at side as episode of unresponsiveness with therapy. (02/05/18 1500)    OBJECTIVE:Basic Lines: Capped IV (02/05/18 1500)  Safety Measures: Alarms; Bed rails (02/05/18 1500)    RN reported Kathryn Peoplesh Fall Scale Score: 110       Last 24 hours of Functional Data     ADLs  Self Cares/ADL's  Bathing Assistance: Not applicable (09/39/59 8667)  Upper Body Dressing Assistance: Minimal Assist (Min);Edge of bed (02/05/18 1500)  Upper Body Dressing Deficit: Thread RUE; Thread LUE;Pull around back; Fasteners (02/05/18 1500)  Toileting Assistance: Moderate Assist (Mod) (02/05/18 1500)  Toileting Deficit: Perineal hygiene;Steadying;Verbal cueing; Increased time to complete;Use of adaptive equipment (02/05/18 1500)  Toileting Equipment Used: Bedside commode (02/05/18 1500)  Self Cares/ADL's Comments #1: Pt completed bathe prior to OT session. Pt requesting to use bathroom for bowel movement, assisted to commode for needs. (02/05/18 1500)  Self Cares/ADL's Comments #2: Standing garry cares at mod of one for balance, weakness, safety.  Further " "numbness and headaches.   Hematological:  Negative for adenopathy. Does not bruise/bleed easily.   Psychiatric/Behavioral:  Negative for decreased concentration, dysphoric mood and sleep disturbance. The patient is not nervous/anxious.           Objective   Vitals:    04/11/24 0819   BP: 112/66   BP Location: Right arm   Patient Position: Sitting   Pulse: 75   Temp: 98.2 °F (36.8 °C)   TempSrc: Oral   SpO2: 98%   Weight: 74 kg (163 lb 2.3 oz)    From 150 six months ago.    Height: 5' 6" (1.676 m)   BMI=26  Physical Exam  Vitals reviewed.   Constitutional:       General: She is not in acute distress.     Appearance: She is well-developed. She is not ill-appearing or diaphoretic.   HENT:      Head: Normocephalic and atraumatic.      Right Ear: Tympanic membrane and ear canal normal.      Left Ear: Tympanic membrane and ear canal normal.      Nose: Nose normal. No congestion.      Mouth/Throat:      Mouth: Mucous membranes are moist.      Pharynx: Oropharynx is clear.   Eyes:      General: No scleral icterus.     Extraocular Movements: Extraocular movements intact.      Conjunctiva/sclera: Conjunctivae normal.      Right eye: Right conjunctiva is not injected.      Left eye: Left conjunctiva is not injected.      Pupils: Pupils are equal, round, and reactive to light.   Neck:      Thyroid: No thyromegaly.      Vascular: No carotid bruit or JVD.   Cardiovascular:      Rate and Rhythm: Normal rate and regular rhythm.      Pulses: Normal pulses.      Heart sounds: Normal heart sounds. No murmur heard.     No friction rub. No gallop.   Pulmonary:      Effort: Pulmonary effort is normal. No respiratory distress.      Breath sounds: Normal breath sounds. No wheezing, rhonchi or rales.   Abdominal:      General: Bowel sounds are normal. There is no distension.      Palpations: Abdomen is soft. There is no mass.      Tenderness: There is no abdominal tenderness.   Musculoskeletal:         General: No tenderness or deformity. " ADLs from recliner deferred due to episode of unresponsiveness. (02/05/18 1500)    Household mobility  Household Mobility  Supine to Sit: Supervision (02/05/18 1500)  Sit to Supine: Total Assist - Dependent (02/05/18 1500)  Sit to Stand: Minimal Assist (Min); Moderate Assist (Mod) (02/05/18 1500)  Stand to Sit: Minimal Assist (Min); Moderate Assist (Mod) (02/05/18 1500)  Stand Pivot Transfers: Total Assist - Non-dependent (agvino priest) (02/05/18 1500)  Toilet Transfers: Minimal Assist (Min); Moderate Assist (Mod) (02/05/18 1500)  Sitting - Static: Supervision (02/05/18 1500)  Sitting - Dynamic: Supervision (02/05/18 1500)  Standing - Static: Minimal Assist (Min) (02/05/18 1500)  Standing - Dynamic: Moderate Assist (Mod) (02/05/18 1500)  Household Mobility Comments #1: grossly min/mod of one for transfers for weight shifting, hand placement, weakness,s afety. Pivots to commode, then recliner, and back to bed with use of lida Oliveira. (02/05/18 1500)  Household Mobility Comments #2: Pt with unresponsive episode at end of session when going from sit to stand at recliner side- RN alerted, stat team called. (02/05/18 1500)    Home Management  Home Management Skills  Home Management Skills Comments: TBA (02/05/18 1500)    Tolerance  OT Activity Tolerance  Activity Tolerance: 1:1 Activity to rest (02/05/18 1500)  Vital Signs: on RA (02/05/18 1500)  Activity Tolerance Comments: fair tolerance at start of session, poor by end due to unresposiveness (02/05/18 1500)    Cognition  Communication/Cognition  Communication: Clear speech; Appropriate for developmental age (02/05/18 1500)  Following Verbal Directions: Follows one step directions with increased time; Follows one step directions with repetition (02/05/18 1500)  Following Demonstrated Directions: Follows one step directions with increased time; Follows one step directions with repetition (02/05/18 1500)  Safety Judgement: Decreased awareness of need for Normal range of motion.      Cervical back: Normal range of motion and neck supple.      Right lower leg: No edema.      Left lower leg: No edema.      Comments: Protective Sensation:  Right: Intact  Left: Intact    Visual Inspection:  Normal -  Bilateral    Pedal Pulses:   Right: Present  Left: Present    Posterior Tibialis Pulses:   Right:Present  Left: Present       Lymphadenopathy:      Cervical: No cervical adenopathy.   Skin:     General: Skin is warm and dry.      Coloration: Skin is not pale.      Findings: No erythema or rash.      Nails: There is no clubbing.   Neurological:      General: No focal deficit present.      Mental Status: She is alert and oriented to person, place, and time.      Cranial Nerves: No cranial nerve deficit.      Motor: No weakness or abnormal muscle tone.      Coordination: Coordination normal.      Gait: Gait normal.   Psychiatric:         Mood and Affect: Mood and affect normal.         Speech: Speech normal.         Behavior: Behavior normal.         Thought Content: Thought content normal.         Judgment: Judgment normal.        Assessment and Plan     1. Routine medical exam  -     CBC Without Differential; Future; Expected date: 04/11/2024  -     Comprehensive Metabolic Panel; Future; Expected date: 04/11/2024  -     Lipid Panel; Future; Expected date: 04/11/2024    2. Type 2 diabetes mellitus without complication, without long-term current use of insulin  -     Hemoglobin A1C; Future; Expected date: 04/11/2024  -     Microalbumin/Creatinine Ratio, Urine  Discontinue Metformin if HbA1c is still <6%.  Patient to schedule updated Eye exam.    3. Hypothyroidism, unspecified type  -     TSH; Future; Expected date: 04/11/2024    4. Screening for colorectal cancer  -     Ambulatory referral/consult to Endo Procedure ; Future; Expected date: 04/12/2024    5. Encounter for screening mammogram for breast cancer  -     Mammo Digital Screening Bilat w/ Terry; Future; Expected  assistance;Decreased awareness of need for safety (02/05/18 1500)  Awareness of Deficits: Decreased awareness of deficits (02/05/18 1500)    Patient's Personal Goal: did not state (02/05/18 1500)    Therapy Goals:    Goals  Short Term Goals to Be Reviewed On: 02/12/18 (02/05/18 1500)  Short Term Goals Are The Same as Discharge Goals: No (02/05/18 1500)  Goal Agreement: Will attempt to contact family/significant other/caregiver;Patient unable to agree with goals and treatment plan (02/05/18 1500)  Feeding Short Term Goal 1: 50% of meal at set up (02/05/18 1500)  Feeding Discharge Goal 1: 100% of meal at set up (02/05/18 1500)  Feeding Discharge Goal Progress 1: Outcome not met, continue to monitor (02/05/18 1500)  Grooming Short Term Goal 1: UE grooming tasks at sup (02/05/18 1500)  Grooming Discharge Goal 1: UE grooming at sink side mod I (02/05/18 1500)  Grooming Discharge Goal Progress 1: Outcome not met, continue to monitor (02/05/18 1500)  Bathing Short Term Goal 1: mod assist from seated (02/05/18 1500)  Bathing Discharge Goal 1: mod I from seated (02/05/18 1500)  Bathing Discharge Goal Progress 1: Outcome not met, continue to monitor (02/05/18 1500)  Dressing Short Term Goal 1: mod for UE dress (02/05/18 1500)  Dressing Discharge Goal 1: mod I for UE dress (02/05/18 1500)  Dressing Discharge Goal Progress 1: Outcome not met, continue to monitor (02/05/18 1500)  Dressing Short Term Goal 2: LE dress at mod (02/05/18 1500)  Dressing Discharge Goal 2: LE dress at mod I (02/05/18 1500)  Dressing Discharge Goal Progress 2: Outcome not met, continue to monitor (02/05/18 1500)  Toileting Short Term Goal 1: min assist (02/05/18 1500)  Toileting Discharge Goal 1: mod I  (02/05/18 1500)  Toileting Discharge Goal Progress 1: Outcome not met, continue to monitor (02/05/18 1500)  Other Discharge Goal 1: pt to follow 50% of simple one step command for carry over to ADLs (02/05/18 1500)  Other Discharge Goal Progress 1: date: 07/18/2024    COVID booster and Prevnar declined.        Follow up in about 6 months (around 10/11/2024).       Outcome met, complete goal (02/05/18 1500)        Total Treatment Time:  OT Time Spent: 45 minutes (02/05/18 1500)    See OT flowsheet for full details regarding the OT therapy provided.

## 2024-04-12 DIAGNOSIS — E11.9 TYPE 2 DIABETES MELLITUS WITHOUT COMPLICATION, WITHOUT LONG-TERM CURRENT USE OF INSULIN: ICD-10-CM

## 2024-04-12 RX ORDER — SEMAGLUTIDE 1.34 MG/ML
1 INJECTION, SOLUTION SUBCUTANEOUS
Qty: 9 ML | Refills: 1 | Status: SHIPPED | OUTPATIENT
Start: 2024-04-12

## 2024-06-20 ENCOUNTER — CLINICAL SUPPORT (OUTPATIENT)
Dept: ENDOSCOPY | Facility: HOSPITAL | Age: 44
End: 2024-06-20
Attending: INTERNAL MEDICINE
Payer: COMMERCIAL

## 2024-06-20 ENCOUNTER — TELEPHONE (OUTPATIENT)
Dept: ENDOSCOPY | Facility: HOSPITAL | Age: 44
End: 2024-06-20

## 2024-06-20 VITALS — HEIGHT: 66 IN | BODY MASS INDEX: 26.2 KG/M2 | WEIGHT: 163 LBS

## 2024-06-20 DIAGNOSIS — Z12.11 SCREENING FOR COLORECTAL CANCER: ICD-10-CM

## 2024-06-20 DIAGNOSIS — Z12.12 SCREENING FOR COLORECTAL CANCER: ICD-10-CM

## 2024-06-20 RX ORDER — SODIUM, POTASSIUM,MAG SULFATES 17.5-3.13G
1 SOLUTION, RECONSTITUTED, ORAL ORAL DAILY
Qty: 1 KIT | Refills: 0 | Status: SHIPPED | OUTPATIENT
Start: 2024-06-20 | End: 2024-06-23

## 2024-06-20 NOTE — TELEPHONE ENCOUNTER
Spoke to patient to schedule procedure(s) Colonoscopy       Physician to perform procedure(s) Dr. JAMIN Hughes  Date of Procedure (s) 9/13/24  Arrival Time 9:30 AM  Time of Procedure(s) 10:30 AM   Location of Procedure(s) [place] [address]  Type of Rx Prep sent to patient: Suprep  Instructions provided to patient via [facility]    Patient was informed on the following information and verbalized understanding. Screening questionnaire reviewed with patient and complete. If procedure requires anesthesia, a responsible adult needs to be present to accompany the patient home, patient cannot drive after receiving anesthesia. Appointment details are tentative, especially check-in time. Patient will receive a prep-op call 7 days prior to confirm check-in time for procedure. If applicable the patient should contact their pharmacy to verify Rx for procedure prep is ready for pick-up. Patient was advised to call the scheduling department at [phone] if pharmacy states no Rx is available. Patient was advised to call the endoscopy scheduling department if any questions or concerns arise.      [facility] Scheduling Department

## 2024-07-15 ENCOUNTER — OFFICE VISIT (OUTPATIENT)
Dept: OPTOMETRY | Facility: CLINIC | Age: 44
End: 2024-07-15
Payer: COMMERCIAL

## 2024-07-15 DIAGNOSIS — Z01.00 EXAMINATION OF EYES AND VISION: Primary | ICD-10-CM

## 2024-07-15 DIAGNOSIS — H52.4 PRESBYOPIA OF BOTH EYES: ICD-10-CM

## 2024-07-15 DIAGNOSIS — H52.11 MYOPIA OF RIGHT EYE: ICD-10-CM

## 2024-07-15 DIAGNOSIS — H52.202 MYOPIA OF LEFT EYE WITH ASTIGMATISM: ICD-10-CM

## 2024-07-15 DIAGNOSIS — E11.9 TYPE 2 DIABETES MELLITUS WITHOUT RETINOPATHY: ICD-10-CM

## 2024-07-15 DIAGNOSIS — H52.12 MYOPIA OF LEFT EYE WITH ASTIGMATISM: ICD-10-CM

## 2024-07-15 PROCEDURE — 3066F NEPHROPATHY DOC TX: CPT | Mod: CPTII,S$GLB,, | Performed by: OPTOMETRIST

## 2024-07-15 PROCEDURE — 99999 PR PBB SHADOW E&M-EST. PATIENT-LVL III: CPT | Mod: PBBFAC,,, | Performed by: OPTOMETRIST

## 2024-07-15 PROCEDURE — 92014 COMPRE OPH EXAM EST PT 1/>: CPT | Mod: S$GLB,,, | Performed by: OPTOMETRIST

## 2024-07-15 PROCEDURE — 2023F DILAT RTA XM W/O RTNOPTHY: CPT | Mod: CPTII,S$GLB,, | Performed by: OPTOMETRIST

## 2024-07-15 PROCEDURE — 3044F HG A1C LEVEL LT 7.0%: CPT | Mod: CPTII,S$GLB,, | Performed by: OPTOMETRIST

## 2024-07-15 PROCEDURE — 1159F MED LIST DOCD IN RCRD: CPT | Mod: CPTII,S$GLB,, | Performed by: OPTOMETRIST

## 2024-07-15 PROCEDURE — 92015 DETERMINE REFRACTIVE STATE: CPT | Mod: S$GLB,,, | Performed by: OPTOMETRIST

## 2024-07-15 PROCEDURE — 3061F NEG MICROALBUMINURIA REV: CPT | Mod: CPTII,S$GLB,, | Performed by: OPTOMETRIST

## 2024-07-15 NOTE — PROGRESS NOTES
\[facility] [place] [facility]\""     Annual Exam            Comments: Eye exam and refraction.  Uses SCL (distance OD and OS) and uses reading glasses over the CLs           Comments    Patient's age: 44 y.o. 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 Guerrier  Wears glasses? Yes, when without CLs in       If yes, wears  Full-time or part-time? Full-time   Present glasses are: Bifocal, SV Distance, SV Reading?  Progressives  Approximate age of present glasses:  3 + years   Got new glasses following last exam, or subsequently?:  No    Any problem with VA with glasses? no    Wears CLs?:  Yes,               If yes:               Type of CL worn:  [employer] Aqua Comfort Plus 8.67/14.2/-3.00 OU   -  only worn very occusionally.                 Any problem with VA with CLs?  as noted above  Headaches?  No   Eye pain/discomfort?  No                                                                                     Flashes?  No   Floaters?  Occasional floaters (OU)  Diplopia/Double vision?  no   Patient's Ocular History:          Any eye surgeries? No          Any eye injury?  No          Any treatment for eye disease?  No - followed for retinal hole in   one eye (OS?) - no treatment  Family history of eye disease?  No   Significant patient medical history:         1. Diabetes?  Yes  yesterday       If yes, IDDM or NIDDM?  Ozempic     2. HBP?  No               ! OTC eyedrops currently using:  No    ! Prescription eye meds currently using:  No    ! Any history of allergy/adverse reaction to any eye meds used   previously?  No    ! Any history of allergy/adverse reaction to eyedrops used during prior   eye exam(s)? No    ! Any history of allergy/adverse reaction to Novacaine or similar meds?   No    ! Any history of allergy/adverse reaction to Epinephrine or similar meds?   No    ! Patient okay with use of anesthetic eyedrops to check eye pressure?    yes        ! Patient okay with  "use of eyedrops to dilate pupils today?  yes    !  Allergies/Medications/Medical History/Family History reviewed today?    Yes     PD 60/56   Reading distance 17"            Last edited by Pop Guerrier, ROBERT on 7/15/2024 12:26 PM.            Assessment /Plan     For exam results, see Encounter Report.    1. Examination of eyes and vision        2. Myopia of right eye        3. Myopia of left eye with astigmatism        4. Presbyopia of both eyes        5. Type 2 diabetes mellitus without retinopathy                     History of retinal hole in the left eye.  Has been followed by Dr. Mi.       Type 2 diabetes without evidence of diabetic retinopathy in either eye.      Myopia (nearsightedness) in the right eye, and myopia with slight astigmatism in the left eye.  Good best-corrected VA in each eye.  Early presbyopia  New spectacle lens Rx issued.     Currently wears daily disposable SCLs.  Only wears CLs occasionally.   No evidence of adverse effects secondary to contact lens wear.  Happy with lens fit and comfort.  Came in without SCLs in today.  New Rx issued for  [employer] AquaComfort Plus contact lenses (-3.00 in the right eye and -2.75 in the left eye).  Single use.  Discard after each use.     Okay to use +1.50  OTC reading glasses as needed over the CLs.        Repeat general eye exam and refraction in one year - or prior if any problems noted in the interim.             "

## 2024-07-15 NOTE — PATIENT INSTRUCTIONS
History of retinal hole in the left eye.  Has been followed by Dr. Mi.       Type 2 diabetes without evidence of diabetic retinopathy in either eye.      Myopia (nearsightedness) in the right eye, and myopia with slight astigmatism in the left eye.  Good best-corrected VA in each eye.  Early presbyopia  New spectacle lens Rx issued.     Currently wears daily disposable SCLs.  Only wears CLs occasionally.   No evidence of adverse effects secondary to contact lens wear.  Happy with lens fit and comfort.  Came in without SCLs in today.  New Rx issued for  [employer] AquaComfort Plus contact lenses (-3.00 in the right eye and -2.75 in the left eye).  Single use.  Discard after each use.     Okay to use +1.50  OTC reading glasses as needed over the CLs.        Repeat general eye exam and refraction in one year - or prior if any problems noted in the interim.

## 2024-07-18 ENCOUNTER — HOSPITAL ENCOUNTER (OUTPATIENT)
Dept: RADIOLOGY | Facility: HOSPITAL | Age: 44
Discharge: HOME | End: 2024-07-18
Attending: INTERNAL MEDICINE
Payer: COMMERCIAL

## 2024-07-18 DIAGNOSIS — Z12.31 ENCOUNTER FOR SCREENING MAMMOGRAM FOR BREAST CANCER: ICD-10-CM

## 2024-07-18 PROCEDURE — 77063 BREAST TOMOSYNTHESIS BI: CPT | Mod: 26,,, | Performed by: RADIOLOGY

## 2024-07-18 PROCEDURE — 77067 SCR MAMMO BI INCL CAD: CPT | Mod: 26,,, | Performed by: RADIOLOGY

## 2024-07-18 PROCEDURE — 77063 BREAST TOMOSYNTHESIS BI: CPT | Mod: TC

## 2024-07-23 ENCOUNTER — PATIENT MESSAGE (OUTPATIENT)
Dept: OPTOMETRY | Facility: CLINIC | Age: 44
End: 2024-07-23
Payer: COMMERCIAL

## 2024-09-05 ENCOUNTER — TELEPHONE (OUTPATIENT)
Dept: ENDOSCOPY | Facility: HOSPITAL | Age: 44
End: 2024-09-05
Payer: COMMERCIAL

## 2024-09-05 NOTE — TELEPHONE ENCOUNTER
Spoke to patient for pre-call to confirm scheduled Colonoscopy and patient verbalized understanding of the following:       Date & arrival time of procedure(s) verified 9/13/24, 9:30 AM.  Location of procedure(s) [place] [address] verified.  NPO status reinforced. Ok to continue clear liquids until 6:30 AM.   Patient denies use of blood thinners.  Patient is taking Ozempic (Semaglutide), instructed to take last dose on/before 9/5/24. Patient states last dose was 8/31/24 and verbalized understanding not to take again prior to procedure.  Patient confirmed receipt of prep instructions and is picking up Rx prep asap  Instructions provided to patient via [facility].  Patient confirmed ride home after procedure if procedure requires anesthesia.   Pre-call screening questionnaire reviewed and completed with patient.   Appointment details are tentative, including check-in time.  If the patient begins taking any blood thinning medications, injectable weight loss/diabetes medications (other than insulin), or Adipex (phentermine) patient was instructed to contact the endoscopy scheduling department as soon as possible.  Patient was advised to call the endoscopy scheduling department if any questions or concerns arise.     [facility] Scheduling Department

## 2024-09-10 ENCOUNTER — ANESTHESIA EVENT (OUTPATIENT)
Dept: ENDOSCOPY | Facility: HOSPITAL | Age: 44
End: 2024-09-10
Payer: COMMERCIAL

## 2024-09-10 NOTE — ANESTHESIA PREPROCEDURE EVALUATION
09/10/2024  Josefina Mark is a 44 y.o., female.    Procedure: COLONOSCOPY (N/A)         Patient Active Problem List   Diagnosis    Hypothyroidism affecting pregnancy in second trimester    Type 2 diabetes mellitus without complication, without long-term current use of insulin    Vitamin D insufficiency    Retinal hole of left eye    Diabetes mellitus type 2 without retinopathy    Right kidney stone       Past Medical History:   Diagnosis Date    Diabetes mellitus, type 2     Fever blister     Hypothyroidism        ECHO: No results found for this or any previous visit.      There is no height or weight on file to calculate BMI.    Tobacco Use: Low Risk  (7/16/2024)    Patient History     Smoking Tobacco Use: Never     Smokeless Tobacco Use: Never     Passive Exposure: Not on file       Social History     Substance and Sexual Activity   Drug Use No        Alcohol Use: Not on file       Review of patient's allergies indicates:  No Known Allergies      Airway:  No value filed.    Pre-op Assessment    I have reviewed the Patient Summary Reports.     I have reviewed the Nursing Notes. I have reviewed the NPO Status.   I have reviewed the Medications.     Review of Systems  Anesthesia Hx:  No problems with previous Anesthesia             Denies Family Hx of Anesthesia complications.    Denies Personal Hx of Anesthesia complications.                    Hematology/Oncology:  Hematology Normal   Oncology Normal                                   EENT/Dental:  EENT/Dental Normal           Cardiovascular:  Cardiovascular Normal Exercise tolerance: good                 ECG has been reviewed.  Functional Capacity good / => 4 METS                         Pulmonary:  Pulmonary Normal                       Renal/:   renal calculi               Hepatic/GI:  Hepatic/GI Normal                  Musculoskeletal:  Musculoskeletal Normal                Neurological:  Neurology Normal                                      Endocrine:  Diabetes Hypothyroidism   Diabetes, Type 2 Diabetes                Thyroid Disease   Hypothyroidism          Dermatological:  Skin Normal    Psych:  Psychiatric Normal                    Physical Exam  General: Well nourished    Airway:  Mallampati: II   TM Distance: Normal  Tongue: Normal  Neck ROM: Normal ROM    Dental:  Intact        Anesthesia Plan  Type of Anesthesia, risks & benefits discussed:    Anesthesia Type: Gen Natural Airway  Intra-op Monitoring Plan: Standard ASA Monitors  Post Op Pain Control Plan: multimodal analgesia and IV/PO Opioids PRN  Induction:  IV  Informed Consent: Informed consent signed with the Patient and all parties understand the risks and agree with anesthesia plan.  All questions answered. Patient consented to blood products? No  ASA Score: 2  Day of Surgery Review of History & Physical: H&P Update referred to the surgeon/provider.I have interviewed and examined the patient. I have reviewed the patient's H&P dated:     Ready For Surgery From Anesthesia Perspective.     .

## 2024-09-12 ENCOUNTER — TELEPHONE (OUTPATIENT)
Dept: ENDOSCOPY | Facility: HOSPITAL | Age: 44
End: 2024-09-12
Payer: COMMERCIAL

## 2024-09-12 NOTE — TELEPHONE ENCOUNTER
Contacted patient with earlier arrival time.  Patient verbalized understanding that all instructions remain the same except:  NPO at 0330  Check in at 0630  No questions at this time.

## 2024-09-13 ENCOUNTER — ANESTHESIA (OUTPATIENT)
Dept: ENDOSCOPY | Facility: HOSPITAL | Age: 44
End: 2024-09-13
Payer: COMMERCIAL

## 2024-09-13 ENCOUNTER — HOSPITAL ENCOUNTER (OUTPATIENT)
Facility: HOSPITAL | Age: 44
Discharge: HOME | End: 2024-09-13
Attending: STUDENT IN AN ORGANIZED HEALTH CARE EDUCATION/TRAINING PROGRAM | Admitting: STUDENT IN AN ORGANIZED HEALTH CARE EDUCATION/TRAINING PROGRAM
Payer: COMMERCIAL

## 2024-09-13 VITALS
RESPIRATION RATE: 21 BRPM | DIASTOLIC BLOOD PRESSURE: 64 MMHG | HEART RATE: 78 BPM | OXYGEN SATURATION: 96 % | TEMPERATURE: 98 F | SYSTOLIC BLOOD PRESSURE: 101 MMHG

## 2024-09-13 DIAGNOSIS — Z12.11 COLON CANCER SCREENING: Primary | ICD-10-CM

## 2024-09-13 LAB
B-HCG UR QL: NEGATIVE
CTP QC/QA: YES
POCT GLUCOSE: 113 MG/DL (ref 70–110)

## 2024-09-13 PROCEDURE — 37000008 HC ANESTHESIA 1ST 15 MINUTES: Performed by: STUDENT IN AN ORGANIZED HEALTH CARE EDUCATION/TRAINING PROGRAM

## 2024-09-13 PROCEDURE — 37000009 HC ANESTHESIA EA ADD 15 MINS: Performed by: STUDENT IN AN ORGANIZED HEALTH CARE EDUCATION/TRAINING PROGRAM

## 2024-09-13 PROCEDURE — G0121 COLON CA SCRN NOT HI RSK IND: HCPCS | Mod: ,,, | Performed by: STUDENT IN AN ORGANIZED HEALTH CARE EDUCATION/TRAINING PROGRAM

## 2024-09-13 PROCEDURE — G0121 COLON CA SCRN NOT HI RSK IND: HCPCS | Performed by: STUDENT IN AN ORGANIZED HEALTH CARE EDUCATION/TRAINING PROGRAM

## 2024-09-13 PROCEDURE — 99900035 HC TECH TIME PER 15 MIN (STAT)

## 2024-09-13 PROCEDURE — 25000003 PHARM REV CODE 250: Performed by: NURSE ANESTHETIST, CERTIFIED REGISTERED

## 2024-09-13 PROCEDURE — 81025 URINE PREGNANCY TEST: CPT | Performed by: STUDENT IN AN ORGANIZED HEALTH CARE EDUCATION/TRAINING PROGRAM

## 2024-09-13 PROCEDURE — 63600175 PHARM REV CODE 636 W HCPCS: Performed by: NURSE ANESTHETIST, CERTIFIED REGISTERED

## 2024-09-13 PROCEDURE — 94761 N-INVAS EAR/PLS OXIMETRY MLT: CPT

## 2024-09-13 PROCEDURE — 82962 GLUCOSE BLOOD TEST: CPT | Performed by: STUDENT IN AN ORGANIZED HEALTH CARE EDUCATION/TRAINING PROGRAM

## 2024-09-13 RX ORDER — PROPOFOL 10 MG/ML
VIAL (ML) INTRAVENOUS CONTINUOUS PRN
Status: DISCONTINUED | OUTPATIENT
Start: 2024-09-13 | End: 2024-09-13

## 2024-09-13 RX ORDER — SODIUM CHLORIDE 9 MG/ML
INJECTION, SOLUTION INTRAVENOUS CONTINUOUS
Status: DISCONTINUED | OUTPATIENT
Start: 2024-09-13 | End: 2024-09-13 | Stop reason: HOSPADM

## 2024-09-13 RX ORDER — LIDOCAINE HYDROCHLORIDE 20 MG/ML
INJECTION INTRAVENOUS
Status: DISCONTINUED | OUTPATIENT
Start: 2024-09-13 | End: 2024-09-13

## 2024-09-13 RX ORDER — PROPOFOL 10 MG/ML
VIAL (ML) INTRAVENOUS
Status: DISCONTINUED | OUTPATIENT
Start: 2024-09-13 | End: 2024-09-13

## 2024-09-13 RX ADMIN — LIDOCAINE HYDROCHLORIDE 100 MG: 20 INJECTION INTRAVENOUS at 07:09

## 2024-09-13 RX ADMIN — PROPOFOL 80 MG: 10 INJECTION, EMULSION INTRAVENOUS at 07:09

## 2024-09-13 RX ADMIN — SODIUM CHLORIDE: 9 INJECTION, SOLUTION INTRAVENOUS at 07:09

## 2024-09-13 RX ADMIN — PROPOFOL 150 MCG/KG/MIN: 10 INJECTION, EMULSION INTRAVENOUS at 07:09

## 2024-09-13 NOTE — H&P
Short Stay Endoscopy History and Physical    PCP - Lida Garcia MD  Referring Physician - PRE-ADMIT NURSE 2, ENDO -[facility]  No address on file    Procedure - Colonoscopy  ASA - per anesthesia  Mallampati - per anesthesia  History of Anesthesia problems - no  Family history Anesthesia problems -  no   Plan of anesthesia - General    HPI  44 y.o. female  Reason for procedure:   Screening for colorectal cancer [Z12.11, Z12.12]        ROS:  Constitutional: No fevers, chills, No weight loss  CV: No chest pain  Pulm: No cough, No shortness of breath  GI: see HPI    Medical History:  has a past medical history of Diabetes mellitus, type 2, Fever blister, and Hypothyroidism.    Surgical History:  has a past surgical history that includes Cystoscopy (8/12/2022); Ureteroscopy (Right, 8/12/2022); Laser lithotripsy (Right, 8/12/2022); Ureteroscopic removal of ureteric calculus (Right, 8/12/2022); and Pyeloscopy (Right, 8/12/2022).    Family History: family history includes Breast cancer in her maternal grandmother; Breast cancer (age of onset: 49) in her maternal aunt; Breast cancer (age of onset: 68) in her mother; Breast cancer (age of onset: 72) in her maternal aunt; Cancer in her father, maternal cousin, maternal uncle, paternal aunt, and paternal grandfather; Cancer (age of onset: 16) in her son; Cancer (age of onset: 65) in her paternal aunt; Cancer (age of onset: 68) in her paternal aunt; Diabetes in her paternal grandmother; Down syndrome in her maternal uncle; Early death in her paternal uncle; Glaucoma in her paternal grandmother; Heart failure in her paternal grandmother; Hypertension in her paternal grandmother; Kidney failure in her maternal aunt..    Social History:  reports that she has never smoked. She has never used smokeless tobacco. She reports that she does not drink alcohol and does not use drugs.    Review of patient's allergies indicates:  No Known Allergies    Medications:    Medications Prior to Admission   Medication Sig Dispense Refill Last Dose    levothyroxine (SYNTHROID) 100 MCG tablet Take 1 tablet (100 mcg total) by mouth before breakfast. 90 tablet 2 9/12/2024    blood sugar diagnostic Strp 1 strip by Misc.(Non-Drug; Combo Route) route once daily. 50 strip 11     lancets Misc 1 lancet by Misc.(Non-Drug; Combo Route) route once daily. 100 each 3     semaglutide (OZEMPIC) 1 mg/dose (4 mg/3 mL) Inject 1 mg into the skin every 7 days. 9 mL 1 08/31/2024    sodium,potassium,mag sulfates (SUPREP BOWEL PREP KIT) 17.5-3.13-1.6 gram SolR Take 177 mLs by mouth once daily. for 2 days 1 kit 0 0300       Physical Exam:    Vital Signs:   Vitals:    09/13/24 0702   BP: 120/67   Pulse:    Resp:    Temp:        General Appearance: Well appearing in no acute distress  Abdomen: Soft, non tender, non distended with normal bowel sounds, no masses    Labs:  Lab Results   Component Value Date    WBC 6.62 04/11/2024    HGB 12.3 04/11/2024    HCT 38.9 04/11/2024     04/11/2024    CHOL 159 04/11/2024    TRIG 57 04/11/2024    HDL 68 04/11/2024    ALT 16 04/11/2024    AST 15 04/11/2024     (L) 04/11/2024    K 4.6 04/11/2024     04/11/2024    CREATININE 0.7 04/11/2024    BUN 14 04/11/2024    CO2 25 04/11/2024    TSH 2.589 04/11/2024    GLUF 103 12/10/2008    HGBA1C 5.7 (H) 04/11/2024       I have explained the risks and benefits of this endoscopic procedure to the patient including but not limited to bleeding, inflammation, infection, perforation, and death.      Ghulam Hughes MD

## 2024-09-13 NOTE — PLAN OF CARE
Chart reviewed. Preop nursing care completed per orders. Safe surgery checklist complete. Pt denies any open wounds cuts or sores.Pt denies any metal in body.Family at bedside and to take belongings. Waiting for _ prior to surgery. Pt AAOX3, VSS on room air. Pt toileted, Bed locked in lowest position, Call light within reach. Pt denies any needs at this time. Will continue to monitor.

## 2024-09-13 NOTE — TRANSFER OF CARE
Anesthesia Transfer of Care Note    Patient: Josefina Mark    Procedure(s) Performed: Procedure(s) (LRB):  COLONOSCOPY (N/A)    Patient location: GI    Anesthesia Type: general    Transport from OR: Transported from OR on room air with adequate spontaneous ventilation    Post pain: adequate analgesia    Post assessment: no apparent anesthetic complications and tolerated procedure well    Post vital signs: stable    Level of consciousness: awake    Nausea/Vomiting: no nausea/vomiting    Complications: none    Transfer of care protocol was followed      Last vitals: Visit Vitals  /67   Pulse 81   Temp 36.7 °C (98.1 °F) (Skin)   Resp 17   SpO2 (!) 10%   Breastfeeding No

## 2024-09-13 NOTE — ANESTHESIA POSTPROCEDURE EVALUATION
Anesthesia Post Evaluation    Patient: Josefina Mark    Procedure(s) Performed: Procedure(s) (LRB):  COLONOSCOPY (N/A)    Final Anesthesia Type: general      Patient location during evaluation: PACU  Patient participation: Yes- Able to Participate  Level of consciousness: awake and alert  Post-procedure vital signs: reviewed and stable  Pain management: adequate  Airway patency: patent    PONV status at discharge: No PONV  Anesthetic complications: no      Cardiovascular status: stable  Respiratory status: room air  Hydration status: euvolemic  Follow-up not needed.              Vitals Value Taken Time   /64 09/13/24 0820   Temp 36.6 °C (97.9 °F) 09/13/24 0801   Pulse 79 09/13/24 0818   Resp 21 09/13/24 0818   SpO2 96 % 09/13/24 0818   Vitals shown include unfiled device data.      Event Time   Out of Recovery 08:15:00         Pain/Nasra Score: Nasra Score: 10 (9/13/2024  8:00 AM)

## 2024-09-19 ENCOUNTER — PATIENT MESSAGE (OUTPATIENT)
Dept: PRIMARY CARE CLINIC | Facility: CLINIC | Age: 44
End: 2024-09-19
Payer: COMMERCIAL

## 2024-10-19 DIAGNOSIS — E03.9 HYPOTHYROIDISM, UNSPECIFIED TYPE: ICD-10-CM

## 2024-10-19 NOTE — TELEPHONE ENCOUNTER
Care Due:                  Date            Visit Type   Department     Provider  --------------------------------------------------------------------------------                                EP -                              PRIMARY      [facility]   Lida Villanthony  Last Visit: 04-      CARE ([facility])   CARE           Radha  Next Visit: None Scheduled  None         None Found                                                            Last  Test          Frequency    Reason                     Performed    Due Date  --------------------------------------------------------------------------------    HBA1C.......  6 months...  semaglutide..............  04-   10-    [facility] Embedded Care Due Messages. Reference number: [id number].   10/19/2024 7:23:15 AM CDT

## 2024-10-21 RX ORDER — LEVOTHYROXINE SODIUM 100 UG/1
100 TABLET ORAL
Qty: 90 TABLET | Refills: 1 | Status: SHIPPED | OUTPATIENT
Start: 2024-10-21

## 2024-10-21 NOTE — TELEPHONE ENCOUNTER
Provider Staff:  Action required for this patient    Requires labs      Please see care gap opportunities below in Care Due Message.    Thanks!  [facility]     Appointments      Date Provider   Last Visit   4/11/2024 Lida Garcia MD   Next Visit   Visit date not found Lida Garcia MD     Refill Decision Note   Josefina Mark  is requesting a refill authorization.    Brief Assessment and Rationale for Refill:   Approve       Medication Therapy Plan:         Comments:     Note composed:8:57 AM 10/21/2024

## 2024-11-21 ENCOUNTER — PATIENT MESSAGE (OUTPATIENT)
Dept: PRIMARY CARE CLINIC | Facility: CLINIC | Age: 44
End: 2024-11-21
Payer: COMMERCIAL

## 2024-11-21 DIAGNOSIS — E11.9 TYPE 2 DIABETES MELLITUS WITHOUT COMPLICATION, WITHOUT LONG-TERM CURRENT USE OF INSULIN: ICD-10-CM

## 2024-11-21 RX ORDER — SEMAGLUTIDE 1.34 MG/ML
1 INJECTION, SOLUTION SUBCUTANEOUS
Qty: 9 ML | Refills: 1 | Status: SHIPPED | OUTPATIENT
Start: 2024-11-21

## 2024-11-21 NOTE — TELEPHONE ENCOUNTER
No care due was identified.  [facility] Embedded Care Due Messages. Reference number: [id number].   11/21/2024 7:56:59 AM CST

## 2025-01-09 ENCOUNTER — PATIENT MESSAGE (OUTPATIENT)
Dept: ADMINISTRATIVE | Facility: HOSPITAL | Age: 45
End: 2025-01-09
Payer: COMMERCIAL

## 2025-01-16 ENCOUNTER — LAB VISIT (OUTPATIENT)
Dept: LAB | Facility: HOSPITAL | Age: 45
End: 2025-01-16
Payer: COMMERCIAL

## 2025-01-16 DIAGNOSIS — E11.9 TYPE 2 DIABETES MELLITUS WITHOUT COMPLICATION, WITHOUT LONG-TERM CURRENT USE OF INSULIN: ICD-10-CM

## 2025-01-16 LAB
ESTIMATED AVG GLUCOSE: 148 MG/DL (ref 68–131)
HBA1C MFR BLD: 6.8 % (ref 4–5.6)

## 2025-01-16 PROCEDURE — 83036 HEMOGLOBIN GLYCOSYLATED A1C: CPT | Performed by: INTERNAL MEDICINE

## 2025-01-16 PROCEDURE — 36415 COLL VENOUS BLD VENIPUNCTURE: CPT | Performed by: INTERNAL MEDICINE

## 2025-02-06 ENCOUNTER — OFFICE VISIT (OUTPATIENT)
Dept: PRIMARY CARE CLINIC | Facility: CLINIC | Age: 45
End: 2025-02-06
Payer: COMMERCIAL

## 2025-02-06 ENCOUNTER — PATIENT MESSAGE (OUTPATIENT)
Dept: PRIMARY CARE CLINIC | Facility: CLINIC | Age: 45
End: 2025-02-06

## 2025-02-06 VITALS
OXYGEN SATURATION: 99 % | TEMPERATURE: 98 F | BODY MASS INDEX: 30.82 KG/M2 | HEART RATE: 78 BPM | HEIGHT: 66 IN | DIASTOLIC BLOOD PRESSURE: 82 MMHG | SYSTOLIC BLOOD PRESSURE: 118 MMHG | WEIGHT: 191.81 LBS

## 2025-02-06 DIAGNOSIS — E03.9 HYPOTHYROIDISM, UNSPECIFIED TYPE: ICD-10-CM

## 2025-02-06 DIAGNOSIS — E66.9 OBESITY (BMI 30-39.9): ICD-10-CM

## 2025-02-06 DIAGNOSIS — E11.9 TYPE 2 DIABETES MELLITUS WITHOUT COMPLICATION, WITHOUT LONG-TERM CURRENT USE OF INSULIN: Primary | ICD-10-CM

## 2025-02-06 PROCEDURE — 1160F RVW MEDS BY RX/DR IN RCRD: CPT | Mod: CPTII,S$GLB,, | Performed by: INTERNAL MEDICINE

## 2025-02-06 PROCEDURE — 1159F MED LIST DOCD IN RCRD: CPT | Mod: CPTII,S$GLB,, | Performed by: INTERNAL MEDICINE

## 2025-02-06 PROCEDURE — 99214 OFFICE O/P EST MOD 30 MIN: CPT | Mod: S$GLB,,, | Performed by: INTERNAL MEDICINE

## 2025-02-06 PROCEDURE — 3079F DIAST BP 80-89 MM HG: CPT | Mod: CPTII,S$GLB,, | Performed by: INTERNAL MEDICINE

## 2025-02-06 PROCEDURE — 99999 PR PBB SHADOW E&M-EST. PATIENT-LVL III: CPT | Mod: PBBFAC,,, | Performed by: INTERNAL MEDICINE

## 2025-02-06 PROCEDURE — 3044F HG A1C LEVEL LT 7.0%: CPT | Mod: CPTII,S$GLB,, | Performed by: INTERNAL MEDICINE

## 2025-02-06 PROCEDURE — 3008F BODY MASS INDEX DOCD: CPT | Mod: CPTII,S$GLB,, | Performed by: INTERNAL MEDICINE

## 2025-02-06 PROCEDURE — 3074F SYST BP LT 130 MM HG: CPT | Mod: CPTII,S$GLB,, | Performed by: INTERNAL MEDICINE

## 2025-02-06 PROCEDURE — 3072F LOW RISK FOR RETINOPATHY: CPT | Mod: CPTII,S$GLB,, | Performed by: INTERNAL MEDICINE

## 2025-02-06 RX ORDER — SEMAGLUTIDE 2.68 MG/ML
2 INJECTION, SOLUTION SUBCUTANEOUS
Qty: 9 ML | Refills: 1 | Status: SHIPPED | OUTPATIENT
Start: 2025-02-06

## 2025-02-06 RX ORDER — LEVOTHYROXINE SODIUM 100 UG/1
100 TABLET ORAL
Qty: 90 TABLET | Refills: 1 | Status: SHIPPED | OUTPATIENT
Start: 2025-02-06

## 2025-02-06 NOTE — TELEPHONE ENCOUNTER
LOV 4/11/24    Spoke With Pt schedule her for 2/6/25 At 3:00 to Discuss Ozempic needs to be increased

## 2025-02-06 NOTE — PROGRESS NOTES
Subjective     Patient ID: Josefina Mark is a 44 y.o. female.    Chief Complaint: Follow-up    Last seen 10 months ago for annual physical with diabetes very well controlled on Ozempic and Metformin. Metformin was discontinued. Repeat HbA1c was just done three weeks ago showing a significant rise in average glucose. Here to discuss. Reports strict compliance with 1 mg Ozempic weekly, but she no longer feels her appetite is suppressed by it. Has a significant weight gain, nearly 30 pounds in the ten months. Admits to eating more. Weight and glucose would benefit from increase in the GLP-1. She has tolerated it with no bad GI side effects, and insurance coverage is adequate.     PMH: .   Diabetes Type 2. HbA1c 5.7% , LDL 80.  Hypothyroidism. TSH 2.589 .    Vitamin D insufficiency.   Nephrolithiasis.    Pap 10/21, Mammogram normal . Eye exam . Vaccines reviewed.     PSH: None.     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     FMH: Lung cancer in father. Mother has interstitial cystitis, a grandmother has HTN, DM, PGF with prostate cancer, MGM - breast cancer at age 58, m. aunt x2 with breast cancer, MGF with liver cancer, p. aunt had colon cancer.      NKDA.    Medications: list reviewed and reconciled.                     Review of Systems   Constitutional:  Negative for fatigue and fever.   Respiratory:  Negative for cough and shortness of breath.    Cardiovascular:  Negative for chest pain, palpitations and leg swelling.   Gastrointestinal:  Negative for abdominal pain, constipation, diarrhea, nausea and vomiting.   Genitourinary:  Negative for dysuria and frequency.   Neurological:  Negative for dizziness, syncope, weakness and headaches.          Objective   Vitals:    25 1451   BP: 118/82   BP Location: Right arm   Patient Position: Sitting   Pulse: 78   Temp: 97.6 °F (36.4 °C)   SpO2: 99%   Weight: 87 kg (191 lb 12.8 oz)       From 163 ten  "months ago.    Height: 5' 6" (1.676 m)   BMI=31  Physical Exam  Constitutional:       General: She is not in acute distress.  Cardiovascular:      Rate and Rhythm: Normal rate and regular rhythm.   Pulmonary:      Effort: Pulmonary effort is normal. No respiratory distress.      Breath sounds: Normal breath sounds. No wheezing or rales.   Musculoskeletal:         General: Normal range of motion.      Right lower leg: No edema.      Left lower leg: No edema.   Skin:     General: Skin is warm and dry.   Neurological:      Mental Status: She is alert.      Cranial Nerves: No cranial nerve deficit.      Coordination: Coordination normal.      Gait: Gait normal.   Psychiatric:         Mood and Affect: Mood normal.         Behavior: Behavior normal.         Thought Content: Thought content normal.         Judgment: Judgment normal.       No visits with results within 3 Week(s) from this visit.   Latest known visit with results is:   Lab Visit on 01/16/2025   Component Date Value    Hemoglobin A1C 01/16/2025 6.8 (H)     Estimated Avg Glucose 01/16/2025 148 (H)         Assessment and Plan     1. Type 2 diabetes mellitus without complication, without long-term current use of insulin  -     Increase Semaglutide (OZEMPIC) 2 mg/dose (8 mg/3 mL) PnIj; Inject 2 mg into the skin every 7 days.  Dispense: 9 mL; Refill: 1    2. Hypothyroidism, unspecified type - was at goal last visit, continue same - daily dosing on empty stomach.  -     levothyroxine (SYNTHROID) 100 MCG tablet; Take 1 tablet (100 mcg total) by mouth before breakfast.  Dispense: 90 tablet; Refill: 1    3. Obesity (BMI 30-39.9)        -     lean diet/healthy choices encouraged, regular exercise.        Follow up in about 5 months (around 7/6/2025) for Annual Physical..      "

## 2025-04-30 DIAGNOSIS — E11.9 TYPE 2 DIABETES MELLITUS WITHOUT COMPLICATION: ICD-10-CM

## 2025-07-23 DIAGNOSIS — E11.9 TYPE 2 DIABETES MELLITUS WITHOUT COMPLICATION, UNSPECIFIED WHETHER LONG TERM INSULIN USE: ICD-10-CM

## 2025-07-23 DIAGNOSIS — E11.9 TYPE 2 DIABETES MELLITUS WITHOUT COMPLICATION: ICD-10-CM

## 2025-07-23 DIAGNOSIS — Z12.31 OTHER SCREENING MAMMOGRAM: ICD-10-CM

## 2025-08-01 DIAGNOSIS — Z00.00 ROUTINE MEDICAL EXAM: ICD-10-CM

## 2025-08-01 DIAGNOSIS — E11.9 TYPE 2 DIABETES MELLITUS WITHOUT COMPLICATION, WITHOUT LONG-TERM CURRENT USE OF INSULIN: ICD-10-CM

## 2025-08-01 DIAGNOSIS — E03.9 HYPOTHYROIDISM, UNSPECIFIED TYPE: Primary | ICD-10-CM

## 2025-08-01 RX ORDER — SEMAGLUTIDE 2.68 MG/ML
2 INJECTION, SOLUTION SUBCUTANEOUS
Qty: 9 ML | Refills: 0 | Status: SHIPPED | OUTPATIENT
Start: 2025-08-01

## 2025-08-01 NOTE — TELEPHONE ENCOUNTER
Care Due:                  Date            Visit Type   Department     Provider  --------------------------------------------------------------------------------                                EP -                              PRIMARY      [facility]   Lida Meneses  Last Visit: 02-      CARE ([facility])   CARE           Radha  Next Visit: None Scheduled  None         None Found                                                            Last  Test          Frequency    Reason                     Performed    Due Date  --------------------------------------------------------------------------------    HBA1C.......  6 months...  semaglutide..............  01-   07-    TSH.........  12 months..  levothyroxine............  04- 04-    [facility] Embedded Care Due Messages. Reference number: [id number].   8/01/2025 8:31:53 AM CDT

## (undated) DEVICE — UNDERGLOVES BIOGEL PI SIZE 7.5

## (undated) DEVICE — SYR 10CC LUER LOCK

## (undated) DEVICE — SET IRR URLGY 2LINE UNIV SPIKE

## (undated) DEVICE — EXTRACTOR TIPLESS 2.4FRX1115CM

## (undated) DEVICE — SOL IRR NACL .9% 3000ML

## (undated) DEVICE — CATH POLLACK OPEN-END FLEXI-TI

## (undated) DEVICE — PACK CYSTO

## (undated) DEVICE — SHEATH FLEXOR URET 10.7FRX35CM

## (undated) DEVICE — SYR ONLY LUER LOCK 20CC

## (undated) DEVICE — TRAY CYSTO BASIN

## (undated) DEVICE — ADAPTER HOSE 10FT 8MM

## (undated) DEVICE — GUIDEWIRE STR TIP HIWIRE 150CM

## (undated) DEVICE — GUIDE WIRE MOTION .035 X 150CM

## (undated) DEVICE — GOWN SMARTGOWN LVL4 X-LONG XL